# Patient Record
Sex: FEMALE | Race: WHITE | NOT HISPANIC OR LATINO | Employment: FULL TIME | URBAN - METROPOLITAN AREA
[De-identification: names, ages, dates, MRNs, and addresses within clinical notes are randomized per-mention and may not be internally consistent; named-entity substitution may affect disease eponyms.]

---

## 2017-03-10 RX ORDER — TRIAMTERENE AND HYDROCHLOROTHIAZIDE 37.5; 25 MG/1; MG/1
CAPSULE ORAL
Qty: 90 CAPSULE | Refills: 2 | Status: SHIPPED | OUTPATIENT
Start: 2017-03-10 | End: 2017-08-24 | Stop reason: SDUPTHER

## 2017-04-11 ENCOUNTER — OFFICE VISIT (OUTPATIENT)
Dept: OBSTETRICS AND GYNECOLOGY | Facility: CLINIC | Age: 62
End: 2017-04-11
Payer: COMMERCIAL

## 2017-04-11 VITALS
WEIGHT: 174.63 LBS | HEIGHT: 66 IN | BODY MASS INDEX: 28.06 KG/M2 | DIASTOLIC BLOOD PRESSURE: 72 MMHG | SYSTOLIC BLOOD PRESSURE: 124 MMHG

## 2017-04-11 DIAGNOSIS — N95.9 MENOPAUSAL AND PERIMENOPAUSAL DISORDER: ICD-10-CM

## 2017-04-11 DIAGNOSIS — Z01.419 VISIT FOR GYNECOLOGIC EXAMINATION: Primary | ICD-10-CM

## 2017-04-11 DIAGNOSIS — R10.31 BILATERAL LOWER ABDOMINAL PAIN: ICD-10-CM

## 2017-04-11 DIAGNOSIS — R10.32 BILATERAL LOWER ABDOMINAL PAIN: ICD-10-CM

## 2017-04-11 PROCEDURE — 99999 PR PBB SHADOW E&M-EST. PATIENT-LVL III: CPT | Mod: PBBFAC,,, | Performed by: OBSTETRICS & GYNECOLOGY

## 2017-04-11 PROCEDURE — 99396 PREV VISIT EST AGE 40-64: CPT | Mod: S$GLB,,, | Performed by: OBSTETRICS & GYNECOLOGY

## 2017-04-11 NOTE — PROGRESS NOTES
HISTORY OF PRESENT ILLNESS:    Chelsie Foster is a 61 y.o. female , presents for a routine exam and has no complaints.  PAP DUE  AND BREAST TEAM F/U BREAST MONITORING - DX MAMMO DUE ON Monday.  OCCAS RLQ AND LLQ SHARP PAINS, ?? GI, GAS SOURCE BUT USG TO MONITOR.  NEW RELATIONSHIP, DISCUSSED HYPOALLERGENIC LUBRICANTS PRN  LAST WEEKEND CAMPING WITH SISTER AND MOTHER    LAST VISIT 3/2016:  4/15 TREATMENT FOR BREAST CA. PAP DUE  AND MAMMO HAS BEEN ORDERED FOR F/U BREAST CA LAST YEAR. OCCASIONAL RLQ AND LLQ PAINS, BRIEF AND NOT OVERALL WORSENING. ASSOCIATED WITH GAS AND REASSURED PELVIC EXAM WITHOUT MASSES; IF PERSISTS MAY MERIT A PELVIC USG BUT PLANS COLONOSCOPY SOON, SO WILL BE CONSCIENTIOUS ABOUT MEASURES TO MAINTAIN NORMAL BOWEL FUNCTION     LAST VISIT 2015:  Pap DUE AND REPEATED. MAMMO DUE 2015 AND ORDERS PLACED. HAS NO GYN C/O AND NOTES SOME DIARRHEA AND ABD PAIN WITH STRESS - REASSURED NO GYN ISSUES ON EXAM.   Last visit 10/2012:  HOT FLUSHES RARELY, WORSE WITH STRESS. STILL MUCH CAFFEINE INTAKE. MAMMO DUE MARCH AND HAD BMD LAST YEAR. PAP DONE AND DISCUSSED RECOMMENDATIONS FOR 3YR SCREENING  WORKS MEDICAL RECORDS - DISABILITY FORMS    Past Medical History:   Diagnosis Date    Anxiety     Breast cancer 2015    Right breast infiltrating ductal CA, ER/UT/Her2 negative    Depression     Genetic testing     see clinic notes 5-19-15 for details, VUS in [place]    GERD (gastroesophageal reflux disease)     Hyperlipidemia     Hypertension     PONV (postoperative nausea and vomiting)     Thyroid disease     hypothryoidism       Past Surgical History:   Procedure Laterality Date    APPENDECTOMY      BREAST BIOPSY Right 2015    [facility]     SECTION      COLONOSCOPY N/A 2016    Procedure: COLONOSCOPY;  Surgeon: YULIYA Sherman MD;  Location: [address]);  Service: Endoscopy;  Laterality: N/A;        MEDICATIONS AND ALLERGIES:      Current Outpatient Prescriptions:      atenolol (TENORMIN) 25 MG tablet, TAKE ONE TABLET BY MOUTH EVERY DAY, Disp: 90 tablet, Rfl: 12    Lactobacillus acidophilus Cap, Take 2 capsules by mouth 2 (two) times daily., Disp: 100 each, Rfl: prn    lorazepam (ATIVAN) 1 MG tablet, TAKE ONE-HALF TO ONE TABLET EVERY EVENING, Disp: 30 tablet, Rfl: 5    pantoprazole (PROTONIX) 40 MG tablet, TAKE 1 TABLET BY MOUTH EVERY DAY, Disp: 90 tablet, Rfl: 3    pravastatin (PRAVACHOL) 40 MG tablet, TAKE 1 TABLET BY MOUTH EVERY DAY, Disp: 90 tablet, Rfl: 3    SYNTHROID 112 mcg tablet, TAKE ONE TABLET BY MOUTH EVERY DAY, Disp: 90 tablet, Rfl: 3    triamterene-hydrochlorothiazide 37.5-25 mg (DYAZIDE) 37.5-25 mg per capsule, TAKE 1 CAPSULE BY MOUTH ONCE DAILY, Disp: 90 capsule, Rfl: 2    Review of patient's allergies indicates:   Allergen Reactions    No known drug allergies        Family History   Problem Relation Age of Onset    Hypertension Mother     Hyperlipidemia Mother     Breast cancer Mother 60    Cancer Mother      breast CA and Basal cell CA    Depression Mother     Colon cancer Father     Dementia Father     Heart disease Father      chf    Cancer Father      colon CA and Basal Cell CA    Breast cancer Maternal Aunt 50    Cancer Maternal Aunt      Breast CA and Colon CA    Breast cancer Cousin 49     maternal    Hypertension Sister     Diabetes Sister     Hypertension Sister     Schizophrenia Son     Bipolar disorder Son     Amblyopia Neg Hx     Blindness Neg Hx     Cataracts Neg Hx     Glaucoma Neg Hx     Macular degeneration Neg Hx     Retinal detachment Neg Hx     Strabismus Neg Hx     Stroke Neg Hx     Thyroid disease Neg Hx     Ovarian cancer Neg Hx        Social History     Social History    Marital status:      Spouse name: N/A    Number of children: 1    Years of education: N/A     Occupational History     [employer]     Social History Main Topics    Smoking status: Never Smoker    Smokeless  "tobacco: Never Used    Alcohol use Yes      Comment: rarely    Drug use: No    Sexual activity: Yes     Partners: Male     Birth control/ protection: Post-menopausal     Other Topics Concern    Not on file     Social History Narrative    Walks a lot at work       COMPREHENSIVE GYN HISTORY:  PAP History:  Denies abnormal Paps except a noted above.  Infection History: Denies STDs. Denies PID.  Benign History: Denies uterine fibroids. Denies ovarian cysts. Denies endometriosis.  Denies other conditions.  Cancer History: Denies cervical cancer. Denies uterine cancer or hyperplasia. Denies ovarian cancer. Denies vulvar cancer or pre-cancer. Denies vaginal cancer or pre-cancer. Denies breast cancer. Denies colon cancer.    ROS:  GENERAL: No weight changes. No swelling. No fatigue. No fever.  CARDIOVASCULAR: No chest pain. No shortness of breath. No leg cramps.   NEUROLOGICAL: No headaches. No vision changes.  BREASTS: No pain. No lumps. No discharge.  ABDOMEN: No pain. No nausea. No vomiting. No diarrhea. No constipation.  REPRODUCTIVE: No abnormal bleeding.   VULVA: No pain. No lesions. No itching.  VAGINA: No relaxation. No itching. No odor. No discharge. No lesions.  URINARY: No incontinence. No nocturia. No frequency. No dysuria.    /72  Ht 5' 6" (1.676 m)  Wt 79.2 kg (174 lb 9.7 oz)  LMP  (LMP Unknown)  BMI 28.18 kg/m2    PE:  APPEARANCE: Well nourished, well developed, in no acute distress.  AFFECT: WNL, alert and oriented x 3.  SKIN: No hirsutism or acne.  NECK: Neck symmetric without masses or thyromegaly.  NODES: No inguinal, cervical, axillary or femoral lymph node enlargement.  CHEST: Good respiratory effort.   ABDOMEN: Soft. No tenderness or masses. No hepatosplenomegaly. No hernias.  BREASTS: LEFT no skin changes or visible lesions. No palpable masses, nipple discharge bilaterally. RIGHT UPPER INNER QUADRANT SCAR S/P SEGMENTAL RESECTION  PELVIC: ATROPHIC EXTERNAL FEMALE GENITALIA without " lesions. Normal hair distribution. Adequate perineal body, normal urethral meatus. VAGINA DRY without lesions or discharge. CERVIX STENOTIC without lesions, discharge or tenderness. No significant cystocele or rectocele. Bimanual exam shows uterus to be normal size, regular, mobile and nontender. Adnexa without masses or tenderness.  EXTREMITIES: No edema.    PROCEDURES:  Pap    DIAGNOSIS:  1. Visit for gynecologic examination     2. Menopausal and perimenopausal disorder     3. Bilateral lower abdominal pain  US Pelvis Comp with Transvag NON-OB (xpd       PLAN:    LABS AND TESTS ORDERED:  Mammogram    COUNSELING:  The patient was counseled today on osteoporosis prevention, calcium supplementation, and regular weight bearing exercise. The patient was also counseled today on ACS PAP guidelines, with recommendations for yearly pelvic exams unless their uterus, cervix, and ovaries were removed for benign reasons; in that case, examinations every 3-5 years are recommended.  The patient was also counseled regarding monthly breast self-examination, routine STD screening for at-risk populations, prophylactic immunizations for transmitted infections such as  HPV, Pertussis, or Influenza as appropriate, and yearly mammograms when indicated by ACS guidelines.  She was advised to see her primary care physician for all other health maintenance.    FOLLOW-UP with me annually.

## 2017-04-20 ENCOUNTER — HOSPITAL ENCOUNTER (OUTPATIENT)
Dept: RADIOLOGY | Facility: HOSPITAL | Age: 62
Discharge: HOME | End: 2017-04-20
Attending: RADIOLOGY
Payer: COMMERCIAL

## 2017-04-20 ENCOUNTER — OFFICE VISIT (OUTPATIENT)
Dept: RADIATION ONCOLOGY | Facility: CLINIC | Age: 62
End: 2017-04-20
Payer: COMMERCIAL

## 2017-04-20 VITALS — DIASTOLIC BLOOD PRESSURE: 65 MMHG | HEART RATE: 73 BPM | SYSTOLIC BLOOD PRESSURE: 143 MMHG

## 2017-04-20 DIAGNOSIS — C50.311 MALIGNANT NEOPLASM OF LOWER-INNER QUADRANT OF RIGHT FEMALE BREAST: ICD-10-CM

## 2017-04-20 DIAGNOSIS — C50.211 MALIGNANT NEOPLASM OF UPPER-INNER QUADRANT OF RIGHT FEMALE BREAST: Primary | ICD-10-CM

## 2017-04-20 PROCEDURE — 99999 PR PBB SHADOW E&M-EST. PATIENT-LVL III: CPT | Mod: PBBFAC,,, | Performed by: RADIOLOGY

## 2017-04-20 PROCEDURE — 99213 OFFICE O/P EST LOW 20 MIN: CPT | Mod: S$GLB,,, | Performed by: RADIOLOGY

## 2017-04-20 PROCEDURE — 77066 DX MAMMO INCL CAD BI: CPT | Mod: 26,,, | Performed by: RADIOLOGY

## 2017-04-20 PROCEDURE — 77062 BREAST TOMOSYNTHESIS BI: CPT | Mod: 26,,, | Performed by: RADIOLOGY

## 2017-04-20 PROCEDURE — 77066 DX MAMMO INCL CAD BI: CPT | Mod: TC

## 2017-04-20 NOTE — PROGRESS NOTES
Subjective:       Patient ID: Chelsie Foster is a 61 y.o. female.    Chief Complaint: Breast Cancer (1yr f/u;mmg)    HPI Comments: This patient returns for follow up visit.     Chelsie has a history of Stage IA (T1a, N0, M0) infiltrating ductal carcinoma of the Rt. breast.  She presented when Diagnostic [facility] confirmed a round mass measuring 7 mm with spiculated margins in the meddle region of the Rt. breast at the 3 o'clock position. The area was not clearly seen on ultrasound. Core biopsies on 4/24/15 revealed infiltrating ductal carcinoma, grade 3. The tumor was ER, [place] and Her - 2 negative. The patient underwent wire localization lumpectomy with sentinel node biopsy on 5/15/15. Pathology revealed residual focal high grade DCIS. The margins were negative. The posterior margin was closest at 3 mm. There was no residual infiltrating carcinoma. One sentinel node was negative for tumor involvement.  The largest focus of tumor was on the core and measured 4 mm.  The patient has large pendulous breast and she was referred for adjuvant radiotherapy.  Given her breast size we elected to proceed with partial breast irradiation.  She completed 34 Gy to the resection cavity via the Mammosite catheter on 6/19/15.  She has remained SOLEDAD since that time.  Today, the patient states she feels well.  No significant breast complaints.      Review of Systems   Constitutional: Negative for activity change, appetite change, chills and fatigue.   Respiratory: Negative for cough and shortness of breath.    Cardiovascular: Negative for chest pain and palpitations.   Gastrointestinal: Negative for abdominal pain, constipation and diarrhea.       Objective:      Physical Exam   Constitutional: She appears well-developed and well-nourished. No distress.   Neck: Normal range of motion. Neck supple.   Pulmonary/Chest: Right breast exhibits no inverted nipple, no mass, no nipple discharge, no skin change and no tenderness. Left breast exhibits  no inverted nipple, no mass, no nipple discharge, no skin change and no tenderness.       Lymphadenopathy:     She has no cervical adenopathy.     She has no axillary adenopathy.        Right: No supraclavicular adenopathy present.        Left: No supraclavicular adenopathy present.       Radiology - Mammogram earlier today was negative.    Assessment:       1. Malignant neoplasm of upper-inner quadrant of right female breast        Plan:       Doing well  no evidence of tumor recurrence.  Will plan follow up in one year with Marlena Groves next year in survivorship clinic.

## 2017-04-21 DIAGNOSIS — Z85.3 PERSONAL HISTORY OF BREAST CANCER: Primary | ICD-10-CM

## 2017-05-10 ENCOUNTER — HOSPITAL ENCOUNTER (OUTPATIENT)
Dept: RADIOLOGY | Facility: HOSPITAL | Age: 62
Discharge: HOME | End: 2017-05-10
Attending: OBSTETRICS & GYNECOLOGY
Payer: COMMERCIAL

## 2017-05-10 DIAGNOSIS — R10.31 BILATERAL LOWER ABDOMINAL PAIN: ICD-10-CM

## 2017-05-10 DIAGNOSIS — R10.32 BILATERAL LOWER ABDOMINAL PAIN: ICD-10-CM

## 2017-05-10 PROCEDURE — 76830 TRANSVAGINAL US NON-OB: CPT | Mod: 26,,, | Performed by: RADIOLOGY

## 2017-05-10 PROCEDURE — 76856 US EXAM PELVIC COMPLETE: CPT | Mod: TC

## 2017-05-10 PROCEDURE — 76856 US EXAM PELVIC COMPLETE: CPT | Mod: 26,,, | Performed by: RADIOLOGY

## 2017-05-15 ENCOUNTER — PATIENT MESSAGE (OUTPATIENT)
Dept: OBSTETRICS AND GYNECOLOGY | Facility: CLINIC | Age: 62
End: 2017-05-15

## 2017-06-08 RX ORDER — ATENOLOL 25 MG/1
25 TABLET ORAL DAILY
Qty: 90 TABLET | Refills: 12 | Status: SHIPPED | OUTPATIENT
Start: 2017-06-08 | End: 2018-06-10 | Stop reason: SDUPTHER

## 2017-06-15 ENCOUNTER — OFFICE VISIT (OUTPATIENT)
Dept: OPTOMETRY | Facility: CLINIC | Age: 62
End: 2017-06-15
Payer: COMMERCIAL

## 2017-06-15 DIAGNOSIS — H53.8 BLURRED VISION, BILATERAL: Primary | ICD-10-CM

## 2017-06-15 DIAGNOSIS — Z46.0 ENCOUNTER FOR FITTING OR ADJUSTMENT OF SPECTACLES OR CONTACT LENSES: Primary | ICD-10-CM

## 2017-06-15 DIAGNOSIS — Z13.5 SCREENING FOR EYE CONDITION: ICD-10-CM

## 2017-06-15 DIAGNOSIS — I10 ESSENTIAL HYPERTENSION: ICD-10-CM

## 2017-06-15 DIAGNOSIS — H02.413 ACQUIRED MECHANICAL PTOSIS OF EYELID, BILATERAL: ICD-10-CM

## 2017-06-15 PROCEDURE — 92310 CONTACT LENS FITTING OU: CPT | Mod: ,,, | Performed by: OPTOMETRIST

## 2017-06-15 PROCEDURE — 99999 PR PBB SHADOW E&M-EST. PATIENT-LVL III: CPT | Mod: PBBFAC,,, | Performed by: OPTOMETRIST

## 2017-06-15 PROCEDURE — 92014 COMPRE OPH EXAM EST PT 1/>: CPT | Mod: S$GLB,,, | Performed by: OPTOMETRIST

## 2017-06-15 PROCEDURE — 99499 UNLISTED E&M SERVICE: CPT | Mod: S$GLB,,, | Performed by: OPTOMETRIST

## 2017-06-15 NOTE — PATIENT INSTRUCTIONS
"Good ocular health in each eye.   Myopia ("nearsightedness") in each eye, per refraction done at previous visit. Presbyopia consistent with age.  New spectacle lens Rx issued for use in lieu of CLs:  Bifocal lenses or single vision distance lenses.  Good contact lens fit with present RGP CLs.  Wearing CLs well in each eye.  Showing need for only minimal power change (+0.25 D) in each CL, but no need to replace either lens at this time.  Issued new contact lens Rx for later use as needed.  Okay to use +2.00 or +2.25 over the counter reading glasses over CLs as needed.  Recheck in one year - or prior if any problems in the interim.         "

## 2017-06-15 NOTE — PROGRESS NOTES
HPI     Concerns About Ocular Health    Additional comments: Eye exam and refraction, and contact lens fiitting.     Wears RGP CLs, and reading glasses over CLs.            Comments   61y old WF  Approximate date of last eye examination: 04/14/16  Name of last eye doctor seen:  Dr Ortiz  City/State:  [place]   Wears glasses? Yes     If yes, wears full-time or part-time?  Part-time    Present glasses are bifocal / S V Distance / S V Reading:  Single vision   Approximate age of present glasses:  12+ yrs   Got new glasses following last exam, or subsequently?:  No   Any problem with VA with glasses?  No  Wears CLs?:  Yes           If yes:              Type of CL worn:  [employer] RGP              Wears full-time or part-time:  Full-time               Sleeps with contact lenses:  No               CL Solution used:  wiseri               How often replace CLs: current ones are 10+y old (?)              Any problem with VA with CLs?  No, Pt st she is happy with   vision and comfort of current RX reading is getting problematic and Pt   uses OTC readers              Has patient read and signed the contact lens fee information   document? Yes  Headaches? occasionally but  Not related to her vision  Eye pain/discomfort?  No                                                                                       Flashes?  No  Floaters?  No  Diplopia/Double vision?  No  Patient's Ocular History:         Any eye surgeries?  No         Any eye injury?  No         Any treatment for eye disease?  No  Family history of eye disease?  Parents + Cataracts   Significant patient medical history:         1. Diabetes?  No       If yes, IDDM or NIDDM? NA   2. HBP?  Yes, controlled by medication              3. Other (describe):  Hyperthyroidism and Elevated Cholesterol      ! OTC eyedrops currently using:  Yes, Pt states she is using OCT   (artificial tears) before putting contacts in, in the morning    ! Prescription eye meds currently using:   "No   ! Any history of allergy/adverse reaction to any eye meds used   previously?  No    ! Any history of allergy/adverse reaction to eyedrops used during prior   eye exam(s)? No    ! Any history of allergy/adverse reaction to Novacaine or similar meds?   No   ! Any history of allergy/adverse reaction to Epinephrine or similar meds?   No    ! Patient okay with use of anesthetic eyedrops to check eye pressure? Yes            ! Patient okay with use of eyedrops to dilate pupils today? Yes    !  Allergies/Medications/Medical History/Family History reviewed today?    Yes    PD =   56/53  Desired reading distance =  16.25"                                                                Last edited by Mat Ortiz, OD on 6/15/2017  3:23 PM. (History)            Assessment /Plan     For exam results, see Encounter Report.    1. Blurred vision, bilateral     2. Acquired mechanical ptosis of eyelid, bilateral     3. Essential hypertension     4. Screening for eye condition                      Good ocular health in each eye.   Myopia ("nearsightedness") in each eye, per refraction done at previous visit. Presbyopia consistent with age.  New spectacle lens Rx issued for use in lieu of CLs:  Bifocal lenses or single vision distance lenses.  Good contact lens fit with present RGP CLs.  Wearing CLs well in each eye.  Showing need for only minimal power change (+0.25 D) in each CL, but no need to replace either lens at this time.  Issued new contact lens Rx for later use as needed.  Okay to use +2.00 or +2.25 over the counter reading glasses over CLs as needed.  Recheck in one year - or prior if any problems in the interim.           "

## 2017-06-15 NOTE — PROGRESS NOTES
"HPI     Contact Lens Follow Up    Additional comments: contact lens follow up with general eye exam.             Comments   Patient in today for contact lens follow-up with general eye examination.    Refer to additional patient encounter notes dated 06/15/2017   Wears RGP   CLs and reading glasses over CLs.          Last edited by Mat Ortiz, OD on 6/15/2017  3:19 PM. (History)            Assessment /Plan     For exam results, see Encounter Report.    1. Encounter for fitting or adjustment of spectacles or contact lenses                      Good ocular health in each eye.   Myopia ("nearsightedness") in each eye, per refraction done at previous visit. Presbyopia consistent with age.  New spectacle lens Rx issued for use in lieu of CLs:  Bifocal lenses or single vision distance lenses.  Good contact lens fit with present RGP CLs.  Wearing CLs well in each eye.  Showing need for only minimal power change (+0.25 D) in each CL, but no need to replace either lens at this time.  Issued new contact lens Rx for later use as needed.  Okay to use +2.00 or +2.25 over the counter reading glasses over CLs as needed.  Recheck in one year - or prior if any problems in the interim.           "

## 2017-08-16 ENCOUNTER — TELEPHONE (OUTPATIENT)
Dept: INTERNAL MEDICINE | Facility: CLINIC | Age: 62
End: 2017-08-16

## 2017-08-16 DIAGNOSIS — Z00.00 ANNUAL PHYSICAL EXAM: Primary | ICD-10-CM

## 2017-08-16 NOTE — TELEPHONE ENCOUNTER
----- Message from Favio Young sent at 8/16/2017 12:36 PM CDT -----  Contact: Pt at [phone]  Doctor appointment and lab have been scheduled.  Please link lab orders to the lab appointment.  Date of doctor appointment:  8/24  Physical or EP:  physical  Date of lab appointment:  8/21  Comments:

## 2017-08-18 ENCOUNTER — TELEPHONE (OUTPATIENT)
Dept: INTERNAL MEDICINE | Facility: CLINIC | Age: 62
End: 2017-08-18

## 2017-08-18 NOTE — TELEPHONE ENCOUNTER
----- Message from Godwin Sanchez sent at 8/18/2017 12:32 PM CDT -----  Regarding: Lab Orders  Pt coming in to do labs. No orders are in epic. Please input orders in epic so patient can have labs drawn.

## 2017-08-21 ENCOUNTER — LAB VISIT (OUTPATIENT)
Dept: LAB | Facility: HOSPITAL | Age: 62
End: 2017-08-21
Attending: INTERNAL MEDICINE
Payer: COMMERCIAL

## 2017-08-21 DIAGNOSIS — Z00.00 ANNUAL PHYSICAL EXAM: ICD-10-CM

## 2017-08-21 LAB
ALBUMIN SERPL BCP-MCNC: 3.6 G/DL
ALP SERPL-CCNC: 98 U/L
ALT SERPL W/O P-5'-P-CCNC: 20 U/L
ANION GAP SERPL CALC-SCNC: 8 MMOL/L
AST SERPL-CCNC: 19 U/L
BASOPHILS # BLD AUTO: 0.04 K/UL
BASOPHILS NFR BLD: 0.6 %
BILIRUB SERPL-MCNC: 0.3 MG/DL
BUN SERPL-MCNC: 19 MG/DL
CALCIUM SERPL-MCNC: 9.8 MG/DL
CHLORIDE SERPL-SCNC: 102 MMOL/L
CHOLEST/HDLC SERPL: 4.6 {RATIO}
CO2 SERPL-SCNC: 31 MMOL/L
CREAT SERPL-MCNC: 0.9 MG/DL
DIFFERENTIAL METHOD: ABNORMAL
EOSINOPHIL # BLD AUTO: 0.3 K/UL
EOSINOPHIL NFR BLD: 4.2 %
ERYTHROCYTE [DISTWIDTH] IN BLOOD BY AUTOMATED COUNT: 15.5 %
EST. GFR  (AFRICAN AMERICAN): >60 ML/MIN/1.73 M^2
EST. GFR  (NON AFRICAN AMERICAN): >60 ML/MIN/1.73 M^2
GLUCOSE SERPL-MCNC: 100 MG/DL
HCT VFR BLD AUTO: 37.6 %
HDL/CHOLESTEROL RATIO: 21.8 %
HDLC SERPL-MCNC: 197 MG/DL
HDLC SERPL-MCNC: 43 MG/DL
HGB BLD-MCNC: 12.6 G/DL
LDLC SERPL CALC-MCNC: 121 MG/DL
LYMPHOCYTES # BLD AUTO: 2 K/UL
LYMPHOCYTES NFR BLD: 30.4 %
MCH RBC QN AUTO: 28.1 PG
MCHC RBC AUTO-ENTMCNC: 33.5 G/DL
MCV RBC AUTO: 84 FL
MONOCYTES # BLD AUTO: 0.6 K/UL
MONOCYTES NFR BLD: 8.6 %
NEUTROPHILS # BLD AUTO: 3.7 K/UL
NEUTROPHILS NFR BLD: 56 %
NONHDLC SERPL-MCNC: 154 MG/DL
PLATELET # BLD AUTO: 263 K/UL
PMV BLD AUTO: 9.8 FL
POTASSIUM SERPL-SCNC: 3.9 MMOL/L
PROT SERPL-MCNC: 7.7 G/DL
RBC # BLD AUTO: 4.48 M/UL
SODIUM SERPL-SCNC: 141 MMOL/L
TRIGL SERPL-MCNC: 165 MG/DL
TSH SERPL DL<=0.005 MIU/L-ACNC: 2.46 UIU/ML
WBC # BLD AUTO: 6.65 K/UL

## 2017-08-21 PROCEDURE — 80061 LIPID PANEL: CPT

## 2017-08-21 PROCEDURE — 36415 COLL VENOUS BLD VENIPUNCTURE: CPT

## 2017-08-21 PROCEDURE — 80053 COMPREHEN METABOLIC PANEL: CPT

## 2017-08-21 PROCEDURE — 84443 ASSAY THYROID STIM HORMONE: CPT

## 2017-08-21 PROCEDURE — 85025 COMPLETE CBC W/AUTO DIFF WBC: CPT

## 2017-08-24 ENCOUNTER — OFFICE VISIT (OUTPATIENT)
Dept: INTERNAL MEDICINE | Facility: CLINIC | Age: 62
End: 2017-08-24
Payer: COMMERCIAL

## 2017-08-24 VITALS
DIASTOLIC BLOOD PRESSURE: 64 MMHG | OXYGEN SATURATION: 95 % | HEIGHT: 66 IN | SYSTOLIC BLOOD PRESSURE: 110 MMHG | HEART RATE: 74 BPM | BODY MASS INDEX: 27.95 KG/M2 | WEIGHT: 173.94 LBS

## 2017-08-24 DIAGNOSIS — T63.484D: ICD-10-CM

## 2017-08-24 DIAGNOSIS — Z00.00 ANNUAL PHYSICAL EXAM: Primary | ICD-10-CM

## 2017-08-24 DIAGNOSIS — F43.9 SITUATIONAL STRESS: ICD-10-CM

## 2017-08-24 DIAGNOSIS — E78.5 HYPERLIPIDEMIA, UNSPECIFIED HYPERLIPIDEMIA TYPE: ICD-10-CM

## 2017-08-24 DIAGNOSIS — I10 ESSENTIAL HYPERTENSION: ICD-10-CM

## 2017-08-24 DIAGNOSIS — K21.9 GASTROESOPHAGEAL REFLUX DISEASE WITHOUT ESOPHAGITIS: ICD-10-CM

## 2017-08-24 DIAGNOSIS — N62 LARGE BREASTS: ICD-10-CM

## 2017-08-24 DIAGNOSIS — E03.4 HYPOTHYROIDISM DUE TO ACQUIRED ATROPHY OF THYROID: ICD-10-CM

## 2017-08-24 PROCEDURE — 99999 PR PBB SHADOW E&M-EST. PATIENT-LVL III: CPT | Mod: PBBFAC,,, | Performed by: INTERNAL MEDICINE

## 2017-08-24 PROCEDURE — 99396 PREV VISIT EST AGE 40-64: CPT | Mod: S$GLB,,, | Performed by: INTERNAL MEDICINE

## 2017-08-24 RX ORDER — LEVOTHYROXINE SODIUM 112 UG/1
112 TABLET ORAL DAILY
Qty: 90 TABLET | Refills: 3 | Status: SHIPPED | OUTPATIENT
Start: 2017-08-24 | End: 2017-08-27

## 2017-08-24 RX ORDER — PANTOPRAZOLE SODIUM 40 MG/1
40 TABLET, DELAYED RELEASE ORAL DAILY
Qty: 90 TABLET | Refills: 3 | Status: SHIPPED | OUTPATIENT
Start: 2017-08-24 | End: 2018-11-15

## 2017-08-24 RX ORDER — LORAZEPAM 1 MG/1
TABLET ORAL
Qty: 30 TABLET | Refills: 2 | Status: SHIPPED | OUTPATIENT
Start: 2017-08-24 | End: 2018-05-20 | Stop reason: SDUPTHER

## 2017-08-24 RX ORDER — TRIAMTERENE AND HYDROCHLOROTHIAZIDE 37.5; 25 MG/1; MG/1
1 CAPSULE ORAL DAILY
Qty: 90 CAPSULE | Refills: 2 | Status: SHIPPED | OUTPATIENT
Start: 2017-08-24 | End: 2018-03-24 | Stop reason: SDUPTHER

## 2017-08-24 NOTE — PROGRESS NOTES
Subjective:       Patient ID: Chelsie Foster is a 61 y.o. female.    Chief Complaint: Annual Exam    HPI   Doing well.  H/o hypothyroidism, htn and hyperlipidemia.  Continued stress with son.    Anxiety has improved.  Takes ativan rarely, occas half for rest.    GERD controlled with protonix.      Hornet sting several days ago.  Review of Systems   Constitutional: Negative for fever and unexpected weight change.   HENT: Negative for congestion and postnasal drip.    Eyes: Negative for pain, discharge and visual disturbance.   Respiratory: Negative for cough, chest tightness, shortness of breath and wheezing.    Cardiovascular: Negative for chest pain and leg swelling.   Gastrointestinal: Negative for abdominal pain, anal bleeding, constipation, diarrhea, nausea and vomiting.   Genitourinary: Negative for difficulty urinating, dysuria, hematuria and urgency.   Skin: Negative for rash.   Neurological: Negative for headaches.   Psychiatric/Behavioral: Negative for dysphoric mood and sleep disturbance. The patient is not nervous/anxious.        Objective:      Physical Exam   Constitutional: She is oriented to person, place, and time. She appears well-developed and well-nourished. No distress.   HENT:   Head: Normocephalic and atraumatic.   Right Ear: External ear normal.   Left Ear: External ear normal.   Nose: Nose normal.   Mouth/Throat: Oropharynx is clear and moist.   Eyes: Conjunctivae and EOM are normal. Pupils are equal, round, and reactive to light. Right eye exhibits no discharge. Left eye exhibits no discharge. No scleral icterus.   Neck: Normal range of motion. Neck supple. No JVD present. No thyromegaly present.   Cardiovascular: Normal rate, regular rhythm and normal heart sounds.  Exam reveals no gallop.    No murmur heard.  Pulmonary/Chest: Effort normal and breath sounds normal. No respiratory distress. She has no wheezes. She has no rales.   Very large breasts.   Abdominal: Soft. Bowel sounds are  normal. She exhibits no distension and no mass. There is no tenderness. There is no rebound and no guarding.   Genitourinary: No breast tenderness, discharge or bleeding.   Musculoskeletal: Normal range of motion. She exhibits no edema or tenderness.   Lymphadenopathy:     She has no cervical adenopathy.   Neurological: She is alert and oriented to person, place, and time. No cranial nerve deficit. Coordination normal.   Skin: Skin is warm and dry. No rash noted. There is erythema (erythematous nmacular papular lesion R upper chest).   Psychiatric: She has a normal mood and affect. Her behavior is normal. Judgment and thought content normal.       Results for orders placed or performed in visit on 08/21/17   Comprehensive metabolic panel   Result Value Ref Range    Sodium 141 136 - 145 mmol/L    Potassium 3.9 3.5 - 5.1 mmol/L    Chloride 102 95 - 110 mmol/L    CO2 31 (H) 23 - 29 mmol/L    Glucose 100 70 - 110 mg/dL    BUN, Bld 19 8 - 23 mg/dL    Creatinine 0.9 0.5 - 1.4 mg/dL    Calcium 9.8 8.7 - 10.5 mg/dL    Total Protein 7.7 6.0 - 8.4 g/dL    Albumin 3.6 3.5 - 5.2 g/dL    Total Bilirubin 0.3 0.1 - 1.0 mg/dL    Alkaline Phosphatase 98 55 - 135 U/L    AST 19 10 - 40 U/L    ALT 20 10 - 44 U/L    Anion Gap 8 8 - 16 mmol/L    eGFR if African American >60.0 >60 mL/min/1.73 m^2    eGFR if non African American >60.0 >60 mL/min/1.73 m^2   CBC auto differential   Result Value Ref Range    WBC 6.65 3.90 - 12.70 K/uL    RBC 4.48 4.00 - 5.40 M/uL    Hemoglobin 12.6 12.0 - 16.0 g/dL    Hematocrit 37.6 37.0 - 48.5 %    MCV 84 82 - 98 fL    MCH 28.1 27.0 - 31.0 pg    MCHC 33.5 32.0 - 36.0 g/dL    RDW 15.5 (H) 11.5 - 14.5 %    Platelets 263 150 - 350 K/uL    MPV 9.8 9.2 - 12.9 fL    Gran # 3.7 1.8 - 7.7 K/uL    Lymph # 2.0 1.0 - 4.8 K/uL    Mono # 0.6 0.3 - 1.0 K/uL    Eos # 0.3 0.0 - 0.5 K/uL    Baso # 0.04 0.00 - 0.20 K/uL    Gran% 56.0 38.0 - 73.0 %    Lymph% 30.4 18.0 - 48.0 %    Mono% 8.6 4.0 - 15.0 %    Eosinophil% 4.2 0.0  - 8.0 %    Basophil% 0.6 0.0 - 1.9 %    Differential Method Automated    Lipid panel   Result Value Ref Range    Cholesterol 197 120 - 199 mg/dL    Triglycerides 165 (H) 30 - 150 mg/dL    HDL 43 40 - 75 mg/dL    LDL Cholesterol 121.0 63.0 - 159.0 mg/dL    HDL/Chol Ratio 21.8 20.0 - 50.0 %    Total Cholesterol/HDL Ratio 4.6 2.0 - 5.0    Non-HDL Cholesterol 154 mg/dL   TSH   Result Value Ref Range    TSH 2.461 0.400 - 4.000 uIU/mL     Assessment:       1. Annual physical exam    2. Essential hypertension    3. Hypothyroidism due to acquired atrophy of thyroid    4. Hyperlipidemia, unspecified hyperlipidemia type    5. Gastroesophageal reflux disease without esophagitis    6. Large breasts    7. Insect sting, undetermined intent, subsequent encounter    8. Situational stress        Plan:       Chelsie was seen today for annual exam.    Diagnoses and all orders for this visit:    Annual physical exam    Essential hypertension    Hypothyroidism due to acquired atrophy of thyroid    Hyperlipidemia, unspecified hyperlipidemia type    Gastroesophageal reflux disease without esophagitis    Large breasts    Insect sting, undetermined intent, subsequent encounter    Situational stress    Other orders  -     levothyroxine (SYNTHROID) 112 MCG tablet; Take 1 tablet (112 mcg total) by mouth once daily.  -     pantoprazole (PROTONIX) 40 MG tablet; Take 1 tablet (40 mg total) by mouth once daily.  -     triamterene-hydrochlorothiazide 37.5-25 mg (DYAZIDE) 37.5-25 mg per capsule; Take 1 capsule by mouth once daily.  -     lorazepam (ATIVAN) 1 MG tablet; TAKE ONE-HALF TO ONE TABLET EVERY EVENING         Shingles and tetanus vaccines at pharmacy   We discussed benefits and risks of breast reduction study.     Antihistamines for insect sting

## 2017-08-27 RX ORDER — LEVOTHYROXINE SODIUM 112 UG/1
TABLET ORAL
Qty: 90 TABLET | Refills: 3 | Status: SHIPPED | OUTPATIENT
Start: 2017-08-27 | End: 2018-11-19 | Stop reason: SDUPTHER

## 2017-09-24 RX ORDER — PRAVASTATIN SODIUM 40 MG/1
TABLET ORAL
Qty: 90 TABLET | Refills: 3 | Status: SHIPPED | OUTPATIENT
Start: 2017-09-24 | End: 2018-10-23 | Stop reason: SDUPTHER

## 2017-10-31 RX ORDER — PANTOPRAZOLE SODIUM 40 MG/1
TABLET, DELAYED RELEASE ORAL
Qty: 90 TABLET | Refills: 3 | Status: SHIPPED | OUTPATIENT
Start: 2017-10-31 | End: 2018-09-26 | Stop reason: SDUPTHER

## 2018-03-21 ENCOUNTER — OFFICE VISIT (OUTPATIENT)
Dept: OPTOMETRY | Facility: CLINIC | Age: 63
End: 2018-03-21
Payer: COMMERCIAL

## 2018-03-21 DIAGNOSIS — H04.123 DRY EYE SYNDROME, BILATERAL: ICD-10-CM

## 2018-03-21 DIAGNOSIS — H43.812: Primary | ICD-10-CM

## 2018-03-21 PROCEDURE — 92014 COMPRE OPH EXAM EST PT 1/>: CPT | Mod: S$GLB,,, | Performed by: OPTOMETRIST

## 2018-03-21 PROCEDURE — 99999 PR PBB SHADOW E&M-EST. PATIENT-LVL II: CPT | Mod: PBBFAC,,, | Performed by: OPTOMETRIST

## 2018-03-21 NOTE — PROGRESS NOTES
HPI     Patient's age: 62 y.o.    Approximate date of last eye examination:  6/15/17  Name of last eye doctor seen: Dr. Ortiz    Pt states that on the other night Monday saw this flash of lot on the    Left side, went away then saw this squiggly line black carmen floating,   never had that before.    Wears glasses? Yes    Wears contacts? yes         ! OTC eyedrops currently using:  Dry eye drops - OU PRN   ! Prescription eye meds currently using:  none         Last edited by Millie Alex MA on 3/21/2018  2:44 PM. (History)            Assessment /Plan     For exam results, see Encounter Report.    Symptomatic posterior vitreous detachment, left  Hemorrhagic PVD OS vs inferior tear    Consult Dr. Hopkins, appt made for Monday         Dry eye syndrome, bilateral   Use artificial tears BID, ointment QHS PRN   Discussed possibly switching to SCL

## 2018-03-25 RX ORDER — TRIAMTERENE AND HYDROCHLOROTHIAZIDE 37.5; 25 MG/1; MG/1
CAPSULE ORAL
Qty: 90 CAPSULE | Refills: 3 | Status: SHIPPED | OUTPATIENT
Start: 2018-03-25 | End: 2019-05-06 | Stop reason: SDUPTHER

## 2018-03-26 ENCOUNTER — INITIAL CONSULT (OUTPATIENT)
Dept: OPHTHALMOLOGY | Facility: CLINIC | Age: 63
End: 2018-03-26
Payer: COMMERCIAL

## 2018-03-26 DIAGNOSIS — H43.12 VITREOUS HEMORRHAGE, LEFT: ICD-10-CM

## 2018-03-26 DIAGNOSIS — H43.812 POSTERIOR VITREOUS DETACHMENT, LEFT: Primary | ICD-10-CM

## 2018-03-26 PROCEDURE — 99999 PR PBB SHADOW E&M-EST. PATIENT-LVL II: CPT | Mod: PBBFAC,,, | Performed by: OPHTHALMOLOGY

## 2018-03-26 PROCEDURE — 92225 PR SPECIAL EYE EXAM, INITIAL: CPT | Mod: LT,S$GLB,, | Performed by: OPHTHALMOLOGY

## 2018-03-26 PROCEDURE — 92014 COMPRE OPH EXAM EST PT 1/>: CPT | Mod: S$GLB,,, | Performed by: OPHTHALMOLOGY

## 2018-03-26 NOTE — LETTER
March 26, 2018      Shannon Iqbal, OD  [address]  [facility] [address]           [facility] - Ophthalmology  [address]  [facility] [address]  Phone: [phone]  Fax: [phone]          Patient: Chelsie Foster   MR Number: [id number]   YOB: 1955   Date of Visit: 3/26/2018       Dear Dr. Shannon Iqbal:    Thank you for referring Chelsie Foster to me for evaluation. Attached you will find relevant portions of my assessment and plan of care.    If you have questions, please do not hesitate to call me. I look forward to following Chelsie Foster along with you.    Sincerely,    LANDEN Hopkins MD    Enclosure  CC:  No Recipients    If you would like to receive this communication electronically, please contact [email or web address] or [phone] to request more information on [employer] Link access.    For providers and/or their staff who would like to refer a patient to [facility], please contact us through our one-stop-shop provider referral line, [facility], at [phone].    If you feel you have received this communication in error or would no longer like to receive these types of communications, please e-mail [email or web address]

## 2018-03-26 NOTE — PROGRESS NOTES
HPI     Eye Problem    Additional comments: consult per Rasheed/PVD OS           Comments   Patient started seeing flashes and floaters OS. She saw Dr Iqbal and was   sent here for evaluation PVD OS. She last saw flashes last week. She is   still seeing squiggly lines OS       Last edited by Kalyn Galindo on 3/26/2018  3:03 PM. (History)        A/P    1. Hemorrhagic PVD OS  No breaks or tears    Likely from ONH, no peripheral traction    RD precautions      1 month Dilate OS

## 2018-04-17 ENCOUNTER — OFFICE VISIT (OUTPATIENT)
Dept: OBSTETRICS AND GYNECOLOGY | Facility: CLINIC | Age: 63
End: 2018-04-17
Payer: COMMERCIAL

## 2018-04-17 VITALS
WEIGHT: 169.75 LBS | SYSTOLIC BLOOD PRESSURE: 120 MMHG | BODY MASS INDEX: 27.28 KG/M2 | HEIGHT: 66 IN | DIASTOLIC BLOOD PRESSURE: 72 MMHG

## 2018-04-17 DIAGNOSIS — N95.9 MENOPAUSAL AND PERIMENOPAUSAL DISORDER: ICD-10-CM

## 2018-04-17 DIAGNOSIS — Z01.419 VISIT FOR GYNECOLOGIC EXAMINATION: Primary | ICD-10-CM

## 2018-04-17 DIAGNOSIS — C50.911: ICD-10-CM

## 2018-04-17 DIAGNOSIS — Z12.39 BREAST CANCER SCREENING: ICD-10-CM

## 2018-04-17 PROCEDURE — 3078F DIAST BP <80 MM HG: CPT | Mod: CPTII,S$GLB,, | Performed by: OBSTETRICS & GYNECOLOGY

## 2018-04-17 PROCEDURE — 99999 PR PBB SHADOW E&M-EST. PATIENT-LVL II: CPT | Mod: PBBFAC,,, | Performed by: OBSTETRICS & GYNECOLOGY

## 2018-04-17 PROCEDURE — 99396 PREV VISIT EST AGE 40-64: CPT | Mod: S$GLB,,, | Performed by: OBSTETRICS & GYNECOLOGY

## 2018-04-17 PROCEDURE — 3074F SYST BP LT 130 MM HG: CPT | Mod: CPTII,S$GLB,, | Performed by: OBSTETRICS & GYNECOLOGY

## 2018-04-17 PROCEDURE — 88175 CYTOPATH C/V AUTO FLUID REDO: CPT

## 2018-04-17 NOTE — PROGRESS NOTES
HISTORY OF PRESENT ILLNESS:    Chelsie Foster is a 62 y.o. female , presents for a routine exam and has no complaints.  PAP SUBMITTED AND NEEDS MAMMO SCHEDULED.  SCARRING RIGHT UPPER INNER BREAST STABLE S/P RT.  NO GYN SX; STILL HAS OCCAS PELVIC PAIN BUT REASSURED USG LAST YEAR NEG AND VERY UNLIKELY DUE TO GYN CAUSE.  MILD VIRUS A WEEK AGO    LAST VISIT :   PAP DUE  AND BREAST TEAM F/U BREAST MONITORING - DX MAMMO DUE ON Monday.  OCCAS RLQ AND LLQ SHARP PAINS, ?? GI, GAS SOURCE BUT USG TO MONITOR.  NEW RELATIONSHIP, DISCUSSED HYPOALLERGENIC LUBRICANTS PRN  LAST WEEKEND CAMPING WITH SISTER AND MOTHER  LAST VISIT 3/2016:  4/15 TREATMENT FOR BREAST CA. PAP DUE  AND MAMMO HAS BEEN ORDERED FOR F/U BREAST CA LAST YEAR. OCCASIONAL RLQ AND LLQ PAINS, BRIEF AND NOT OVERALL WORSENING. ASSOCIATED WITH GAS AND REASSURED PELVIC EXAM WITHOUT MASSES; IF PERSISTS MAY MERIT A PELVIC USG BUT PLANS COLONOSCOPY SOON, SO WILL BE CONSCIENTIOUS ABOUT MEASURES TO MAINTAIN NORMAL BOWEL FUNCTION  LAST VISIT 2015:  Pap DUE AND REPEATED. MAMMO DUE 2015 AND ORDERS PLACED. HAS NO GYN C/O AND NOTES SOME DIARRHEA AND ABD PAIN WITH STRESS - REASSURED NO GYN ISSUES ON EXAM.   Last visit 10/2012:  HOT FLUSHES RARELY, WORSE WITH STRESS. STILL MUCH CAFFEINE INTAKE. MAMMO DUE MARCH AND HAD BMD LAST YEAR. PAP DONE AND DISCUSSED RECOMMENDATIONS FOR 3YR SCREENING  WORKS MEDICAL RECORDS - DISABILITY FORMS    Past Medical History:   Diagnosis Date    Anxiety     Breast cancer 2015    Right breast infiltrating ductal CA, ER/VA/Her2 negative    Depression     Genetic testing     see clinic notes 5-19-15 for details, VUS in [facility]    GERD (gastroesophageal reflux disease)     Hyperlipidemia     Hypertension     PONV (postoperative nausea and vomiting)     Thyroid disease     hypothryoidism       Past Surgical History:   Procedure Laterality Date    APPENDECTOMY      BREAST BIOPSY Right 2015    [facility]     SECTION       COLONOSCOPY N/A 5/5/2016    Procedure: COLONOSCOPY;  Surgeon: YULIYA Sherman MD;  Location: [facility] ([address] [place] [address]);  Service: Endoscopy;  Laterality: N/A;        MEDICATIONS AND ALLERGIES:      Current Outpatient Prescriptions:     atenolol (TENORMIN) 25 MG tablet, Take 1 tablet (25 mg total) by mouth once daily., Disp: 90 tablet, Rfl: 12    Lactobacillus acidophilus Cap, Take 2 capsules by mouth 2 (two) times daily., Disp: 100 each, Rfl: prn    lorazepam (ATIVAN) 1 MG tablet, TAKE ONE-HALF TO ONE TABLET EVERY EVENING, Disp: 30 tablet, Rfl: 2    pantoprazole (PROTONIX) 40 MG tablet, Take 1 tablet (40 mg total) by mouth once daily., Disp: 90 tablet, Rfl: 3    pantoprazole (PROTONIX) 40 MG tablet, TAKE 1 TABLET BY MOUTH EVERY DAY, Disp: 90 tablet, Rfl: 3    pravastatin (PRAVACHOL) 40 MG tablet, TAKE ONE TABLET BY MOUTH EVERY DAY, Disp: 90 tablet, Rfl: 3    SYNTHROID 112 mcg tablet, TAKE 1 TABLET BY MOUTH ONCE DAILY, Disp: 90 tablet, Rfl: 3    triamterene-hydrochlorothiazide 37.5-25 mg (DYAZIDE) 37.5-25 mg per capsule, TAKE 1 CAPSULE BY MOUTH EVERY DAY., Disp: 90 capsule, Rfl: 3    Review of patient's allergies indicates:   Allergen Reactions    No known drug allergies        Family History   Problem Relation Age of Onset    Hypertension Mother     Hyperlipidemia Mother     Breast cancer Mother 60    Cancer Mother      breast CA and Basal cell CA    Depression Mother     Colon cancer Father     Dementia Father     Heart disease Father      chf    Cancer Father      colon CA and Basal Cell CA    Breast cancer Maternal Aunt 50    Cancer Maternal Aunt      Breast CA and Colon CA    Breast cancer Cousin 49     maternal    Hypertension Sister     Diabetes Sister     Hypertension Sister     Bipolar disorder Son     Mental illness Son      bipolar/schizoaffective    Amblyopia Neg Hx     Blindness Neg Hx     Cataracts Neg Hx     Glaucoma Neg Hx     Macular degeneration Neg Hx     Retinal  "detachment Neg Hx     Strabismus Neg Hx     Stroke Neg Hx     Thyroid disease Neg Hx     Ovarian cancer Neg Hx        Social History     Social History    Marital status:      Spouse name: N/A    Number of children: 1    Years of education: N/A     Occupational History     [employer]     Social History Main Topics    Smoking status: Never Smoker    Smokeless tobacco: Never Used    Alcohol use Yes      Comment: rarely    Drug use: No    Sexual activity: Yes     Partners: Male     Birth control/ protection: Post-menopausal     Other Topics Concern    Not on file     Social History Narrative    Walks a lot at work:  [id number] most days1       COMPREHENSIVE GYN HISTORY:  PAP History:  Denies abnormal Paps except a noted above.  Infection History: Denies STDs. Denies PID.  Benign History: Denies uterine fibroids. Denies ovarian cysts. Denies endometriosis.  Denies other conditions.  Cancer History: Denies cervical cancer. Denies uterine cancer or hyperplasia. Denies ovarian cancer. Denies vulvar cancer or pre-cancer. Denies vaginal cancer or pre-cancer. Denies breast cancer. Denies colon cancer.    ROS:  GENERAL: No weight changes. No swelling. No fatigue. No fever.  CARDIOVASCULAR: No chest pain. No shortness of breath. No leg cramps.   NEUROLOGICAL: No headaches. No vision changes.  BREASTS: No pain. No lumps. No discharge.  ABDOMEN: No pain. No nausea. No vomiting. No diarrhea. No constipation.  REPRODUCTIVE: No abnormal bleeding.   VULVA: No pain. No lesions. No itching.  VAGINA: No relaxation. No itching. No odor. No discharge. No lesions.  URINARY: No incontinence. No nocturia. No frequency. No dysuria.    /72   Ht 5' 6" (1.676 m)   Wt 77 kg (169 lb 12.1 oz)   LMP  (LMP Unknown)   BMI 27.40 kg/m²     PE:  APPEARANCE: Well nourished, well developed, in no acute distress.  AFFECT: WNL, alert and oriented x 3.  SKIN: No hirsutism or acne.  NECK: Neck symmetric without " masses or thyromegaly.  NODES: No inguinal, cervical, axillary or femoral lymph node enlargement.  CHEST: Good respiratory effort.   ABDOMEN: Soft. No tenderness or masses. No hepatosplenomegaly. No hernias.  BREASTS: LEFT no skin changes or visible lesions. No palpable masses, nipple discharge bilaterally. RIGHT BREAST UPPER INNER SKIN RETRACTION, STABLE SCAR  PELVIC: ATROPHIC EXTERNAL FEMALE GENITALIA without lesions. Normal hair distribution. Adequate perineal body, normal urethral meatus. VAGINA DRY without lesions or discharge. CERVIX STENOTIC without lesions, discharge or tenderness. No significant cystocele or rectocele. Bimanual exam shows uterus to be normal size, regular, mobile and nontender. Adnexa without masses or tenderness.  EXTREMITIES: No edema.    PROCEDURES:  Pap    DIAGNOSIS:  1. Visit for gynecologic examination  Liquid-based pap smear, screening   2. Menopausal and perimenopausal disorder     3. Breast cancer screening     4. Stage 1 infiltrating ductal carcinoma of right female breast         PLAN:    LABS AND TESTS ORDERED:  Mammogram    COUNSELING:  The patient was counseled today on osteoporosis prevention, calcium supplementation, and regular weight bearing exercise. The patient was also counseled today on ACS PAP guidelines, with recommendations for yearly pelvic exams unless their uterus, cervix, and ovaries were removed for benign reasons; in that case, examinations every 3-5 years are recommended.  The patient was also counseled regarding monthly breast self-examination, routine STD screening for at-risk populations, prophylactic immunizations for transmitted infections such as  HPV, Pertussis, or Influenza as appropriate, and yearly mammograms when indicated by ACS guidelines.  She was advised to see her primary care physician for all other health maintenance.    FOLLOW-UP with me annually.

## 2018-04-22 ENCOUNTER — PATIENT MESSAGE (OUTPATIENT)
Dept: OBSTETRICS AND GYNECOLOGY | Facility: HOSPITAL | Age: 63
End: 2018-04-22

## 2018-04-23 ENCOUNTER — HOSPITAL ENCOUNTER (OUTPATIENT)
Dept: RADIOLOGY | Facility: HOSPITAL | Age: 63
Discharge: HOME | End: 2018-04-23
Attending: NURSE PRACTITIONER
Payer: COMMERCIAL

## 2018-04-23 DIAGNOSIS — Z85.3 PERSONAL HISTORY OF BREAST CANCER: ICD-10-CM

## 2018-04-23 PROCEDURE — 77066 DX MAMMO INCL CAD BI: CPT | Mod: TC,PO

## 2018-04-23 PROCEDURE — 77066 DX MAMMO INCL CAD BI: CPT | Mod: 26,,, | Performed by: RADIOLOGY

## 2018-04-23 PROCEDURE — G0279 TOMOSYNTHESIS, MAMMO: HCPCS | Mod: 26,,, | Performed by: RADIOLOGY

## 2018-04-24 ENCOUNTER — OFFICE VISIT (OUTPATIENT)
Dept: OPHTHALMOLOGY | Facility: CLINIC | Age: 63
End: 2018-04-24
Payer: COMMERCIAL

## 2018-04-24 ENCOUNTER — TELEPHONE (OUTPATIENT)
Dept: SURGERY | Facility: CLINIC | Age: 63
End: 2018-04-24

## 2018-04-24 DIAGNOSIS — H43.12 VITREOUS HEMORRHAGE, LEFT: Primary | ICD-10-CM

## 2018-04-24 DIAGNOSIS — H43.812 POSTERIOR VITREOUS DETACHMENT, LEFT: ICD-10-CM

## 2018-04-24 PROCEDURE — 99999 PR PBB SHADOW E&M-EST. PATIENT-LVL III: CPT | Mod: PBBFAC,,, | Performed by: OPHTHALMOLOGY

## 2018-04-24 PROCEDURE — 92226 PR SPECIAL EYE EXAM, SUBSEQUENT: CPT | Mod: LT,S$GLB,, | Performed by: OPHTHALMOLOGY

## 2018-04-24 PROCEDURE — 92014 COMPRE OPH EXAM EST PT 1/>: CPT | Mod: S$GLB,,, | Performed by: OPHTHALMOLOGY

## 2018-04-24 NOTE — TELEPHONE ENCOUNTER
Contacted the patient regarding the message below to schedule breast exam with Marlena Groves NP.  The patient did not answer, message left on the patient voicemail to contact our office regarding scheduling the appointment.  Marlena Groves NP notified of this information.

## 2018-04-24 NOTE — PROGRESS NOTES
HPI     1 mo / Dilates OS  DLS- 03/26/2018 Dr. Hopkins     Pt sts no vision change since last visit. Occasionally will still get   floaters OS.  Denies pain   (-)Photophobia  (-)Glare    No gtts    No prev eye sx     Last edited by Radha Pisano on 4/24/2018  3:32 PM. (History)           A/P    1. Hemorrhagic PVD OS  No breaks or tears    Likely from ONH, no peripheral traction    improving    RD precautions      PRN

## 2018-04-24 NOTE — TELEPHONE ENCOUNTER
----- Message from SUZI Camp sent at 4/24/2018  2:15 PM CDT -----  Patient not seen by me since 9/2016. Needs CBE.

## 2018-05-21 RX ORDER — LORAZEPAM 1 MG/1
TABLET ORAL
Qty: 30 TABLET | Refills: 1 | Status: SHIPPED | OUTPATIENT
Start: 2018-05-21 | End: 2019-01-22 | Stop reason: SDUPTHER

## 2018-06-10 RX ORDER — ATENOLOL 25 MG/1
TABLET ORAL
Qty: 90 TABLET | Refills: 12 | Status: SHIPPED | OUTPATIENT
Start: 2018-06-10 | End: 2019-06-13 | Stop reason: SDUPTHER

## 2018-08-09 ENCOUNTER — TELEPHONE (OUTPATIENT)
Dept: INTERNAL MEDICINE | Facility: CLINIC | Age: 63
End: 2018-08-09

## 2018-08-09 DIAGNOSIS — Z00.00 ANNUAL PHYSICAL EXAM: Primary | ICD-10-CM

## 2018-08-09 NOTE — TELEPHONE ENCOUNTER
----- Message from Kedar Holman sent at 8/9/2018 10:13 AM CDT -----  Contact: self / Ext [phone]  Type: Orders Request    What orders/ testing are being requested? Routine labs    Is there a future appointment scheduled for the patient with PCP? Yes    When? 09/26    Would you prefer a response via iSites? no    Comments:Please advise.      tHANKS

## 2018-08-23 RX ORDER — LEVOTHYROXINE SODIUM 112 UG/1
TABLET ORAL DAILY
Qty: 90 TABLET | Refills: 3 | Status: SHIPPED | OUTPATIENT
Start: 2018-08-23 | End: 2018-09-26 | Stop reason: SDUPTHER

## 2018-08-27 ENCOUNTER — LAB VISIT (OUTPATIENT)
Dept: LAB | Facility: HOSPITAL | Age: 63
End: 2018-08-27
Attending: INTERNAL MEDICINE
Payer: COMMERCIAL

## 2018-08-27 DIAGNOSIS — Z00.00 ANNUAL PHYSICAL EXAM: ICD-10-CM

## 2018-08-27 LAB
ALBUMIN SERPL BCP-MCNC: 3.8 G/DL
ALP SERPL-CCNC: 114 U/L
ALT SERPL W/O P-5'-P-CCNC: 26 U/L
ANION GAP SERPL CALC-SCNC: 8 MMOL/L
AST SERPL-CCNC: 21 U/L
BASOPHILS # BLD AUTO: 0.05 K/UL
BASOPHILS NFR BLD: 0.6 %
BILIRUB SERPL-MCNC: 0.3 MG/DL
BUN SERPL-MCNC: 23 MG/DL
CALCIUM SERPL-MCNC: 10.3 MG/DL
CHLORIDE SERPL-SCNC: 101 MMOL/L
CHOLEST SERPL-MCNC: 200 MG/DL
CHOLEST/HDLC SERPL: 4.8 {RATIO}
CO2 SERPL-SCNC: 32 MMOL/L
CREAT SERPL-MCNC: 0.9 MG/DL
DIFFERENTIAL METHOD: ABNORMAL
EOSINOPHIL # BLD AUTO: 0.3 K/UL
EOSINOPHIL NFR BLD: 3.6 %
ERYTHROCYTE [DISTWIDTH] IN BLOOD BY AUTOMATED COUNT: 15.3 %
EST. GFR  (AFRICAN AMERICAN): >60 ML/MIN/1.73 M^2
EST. GFR  (NON AFRICAN AMERICAN): >60 ML/MIN/1.73 M^2
GLUCOSE SERPL-MCNC: 103 MG/DL
HCT VFR BLD AUTO: 40.5 %
HDLC SERPL-MCNC: 42 MG/DL
HDLC SERPL: 21 %
HGB BLD-MCNC: 13.1 G/DL
IMM GRANULOCYTES # BLD AUTO: 0.03 K/UL
IMM GRANULOCYTES NFR BLD AUTO: 0.3 %
LDLC SERPL CALC-MCNC: 120.8 MG/DL
LYMPHOCYTES # BLD AUTO: 2.6 K/UL
LYMPHOCYTES NFR BLD: 30.7 %
MCH RBC QN AUTO: 28 PG
MCHC RBC AUTO-ENTMCNC: 32.3 G/DL
MCV RBC AUTO: 87 FL
MONOCYTES # BLD AUTO: 0.7 K/UL
MONOCYTES NFR BLD: 8.4 %
NEUTROPHILS # BLD AUTO: 4.9 K/UL
NEUTROPHILS NFR BLD: 56.4 %
NONHDLC SERPL-MCNC: 158 MG/DL
NRBC BLD-RTO: 0 /100 WBC
PLATELET # BLD AUTO: 300 K/UL
PMV BLD AUTO: 9.7 FL
POTASSIUM SERPL-SCNC: 4.1 MMOL/L
PROT SERPL-MCNC: 7.9 G/DL
RBC # BLD AUTO: 4.68 M/UL
SODIUM SERPL-SCNC: 141 MMOL/L
TRIGL SERPL-MCNC: 186 MG/DL
TSH SERPL DL<=0.005 MIU/L-ACNC: 1 UIU/ML
WBC # BLD AUTO: 8.6 K/UL

## 2018-08-27 PROCEDURE — 80061 LIPID PANEL: CPT

## 2018-08-27 PROCEDURE — 36415 COLL VENOUS BLD VENIPUNCTURE: CPT

## 2018-08-27 PROCEDURE — 80053 COMPREHEN METABOLIC PANEL: CPT

## 2018-08-27 PROCEDURE — 85025 COMPLETE CBC W/AUTO DIFF WBC: CPT

## 2018-08-27 PROCEDURE — 84443 ASSAY THYROID STIM HORMONE: CPT

## 2018-09-26 ENCOUNTER — OFFICE VISIT (OUTPATIENT)
Dept: INTERNAL MEDICINE | Facility: CLINIC | Age: 63
End: 2018-09-26
Payer: COMMERCIAL

## 2018-09-26 VITALS
HEIGHT: 66 IN | DIASTOLIC BLOOD PRESSURE: 76 MMHG | WEIGHT: 172 LBS | HEART RATE: 69 BPM | BODY MASS INDEX: 27.64 KG/M2 | OXYGEN SATURATION: 97 % | SYSTOLIC BLOOD PRESSURE: 110 MMHG

## 2018-09-26 DIAGNOSIS — E03.4 HYPOTHYROIDISM DUE TO ACQUIRED ATROPHY OF THYROID: ICD-10-CM

## 2018-09-26 DIAGNOSIS — K21.9 GASTROESOPHAGEAL REFLUX DISEASE WITHOUT ESOPHAGITIS: ICD-10-CM

## 2018-09-26 DIAGNOSIS — C50.911: ICD-10-CM

## 2018-09-26 DIAGNOSIS — Z00.00 ANNUAL PHYSICAL EXAM: Primary | ICD-10-CM

## 2018-09-26 DIAGNOSIS — I10 ESSENTIAL HYPERTENSION: ICD-10-CM

## 2018-09-26 DIAGNOSIS — E78.5 HYPERLIPIDEMIA, UNSPECIFIED HYPERLIPIDEMIA TYPE: ICD-10-CM

## 2018-09-26 DIAGNOSIS — Z80.0 FAMILY HISTORY OF COLON CANCER: ICD-10-CM

## 2018-09-26 PROCEDURE — 3074F SYST BP LT 130 MM HG: CPT | Mod: CPTII,S$GLB,, | Performed by: INTERNAL MEDICINE

## 2018-09-26 PROCEDURE — 99999 PR PBB SHADOW E&M-EST. PATIENT-LVL III: CPT | Mod: PBBFAC,,, | Performed by: INTERNAL MEDICINE

## 2018-09-26 PROCEDURE — 99396 PREV VISIT EST AGE 40-64: CPT | Mod: S$GLB,,, | Performed by: INTERNAL MEDICINE

## 2018-09-26 PROCEDURE — 3078F DIAST BP <80 MM HG: CPT | Mod: CPTII,S$GLB,, | Performed by: INTERNAL MEDICINE

## 2018-09-26 NOTE — PROGRESS NOTES
Subjective:       Patient ID: Chelsie Foster is a 62 y.o. female.    Chief Complaint: Annual Exam and Fatigue    HPI   Not working as much at work.    Schizophrenic son lives with her.  Helps out with ill mother.      H/o hypothyroidism, htn and hyperlipidemia.     Controlled with dyazide, atenolol and Pravastatin    Intermittent anxiety, for which she occas takes ativan.    DCIS of breast.  Recent nl MG.    Review of Systems   Constitutional: Negative for fever and unexpected weight change.   HENT: Negative for congestion and postnasal drip.    Eyes: Negative for pain, discharge and visual disturbance.   Respiratory: Negative for cough, chest tightness, shortness of breath and wheezing.    Cardiovascular: Negative for chest pain and leg swelling.   Gastrointestinal: Negative for abdominal pain, constipation, diarrhea and nausea.   Genitourinary: Negative for difficulty urinating, dysuria and hematuria.   Skin: Negative for rash.   Neurological: Negative for headaches.   Psychiatric/Behavioral: Negative for dysphoric mood and sleep disturbance. The patient is not nervous/anxious.        Objective:      Physical Exam   Constitutional: She is oriented to person, place, and time. She appears well-developed and well-nourished. No distress.   HENT:   Head: Normocephalic and atraumatic.   Right Ear: External ear normal.   Left Ear: External ear normal.   Nose: Nose normal.   Mouth/Throat: Oropharynx is clear and moist.   Eyes: Conjunctivae and EOM are normal. Pupils are equal, round, and reactive to light. Right eye exhibits no discharge. Left eye exhibits no discharge. No scleral icterus.   Neck: Normal range of motion. Neck supple. No JVD present. No thyromegaly present.   Cardiovascular: Normal rate, regular rhythm and normal heart sounds. Exam reveals no gallop.   No murmur heard.  Pulmonary/Chest: Effort normal and breath sounds normal. No respiratory distress. She has no wheezes. She has no rales.   Abdominal:  Soft. Bowel sounds are normal. She exhibits no distension and no mass. There is no tenderness. There is no rebound and no guarding.   Musculoskeletal: Normal range of motion. She exhibits no edema or tenderness.   Lymphadenopathy:     She has no cervical adenopathy.   Neurological: She is alert and oriented to person, place, and time. No cranial nerve deficit. Coordination normal.   Skin: Skin is warm and dry. No rash noted.   Psychiatric: She has a normal mood and affect. Her behavior is normal. Judgment and thought content normal.       Results for orders placed or performed in visit on 08/27/18   CBC auto differential   Result Value Ref Range    WBC 8.60 3.90 - 12.70 K/uL    RBC 4.68 4.00 - 5.40 M/uL    Hemoglobin 13.1 12.0 - 16.0 g/dL    Hematocrit 40.5 37.0 - 48.5 %    MCV 87 82 - 98 fL    MCH 28.0 27.0 - 31.0 pg    MCHC 32.3 32.0 - 36.0 g/dL    RDW 15.3 (H) 11.5 - 14.5 %    Platelets 300 150 - 350 K/uL    MPV 9.7 9.2 - 12.9 fL    Immature Granulocytes 0.3 0.0 - 0.5 %    Gran # (ANC) 4.9 1.8 - 7.7 K/uL    Immature Grans (Abs) 0.03 0.00 - 0.04 K/uL    Lymph # 2.6 1.0 - 4.8 K/uL    Mono # 0.7 0.3 - 1.0 K/uL    Eos # 0.3 0.0 - 0.5 K/uL    Baso # 0.05 0.00 - 0.20 K/uL    nRBC 0 0 /100 WBC    Gran% 56.4 38.0 - 73.0 %    Lymph% 30.7 18.0 - 48.0 %    Mono% 8.4 4.0 - 15.0 %    Eosinophil% 3.6 0.0 - 8.0 %    Basophil% 0.6 0.0 - 1.9 %    Differential Method Automated    Comprehensive metabolic panel   Result Value Ref Range    Sodium 141 136 - 145 mmol/L    Potassium 4.1 3.5 - 5.1 mmol/L    Chloride 101 95 - 110 mmol/L    CO2 32 (H) 23 - 29 mmol/L    Glucose 103 70 - 110 mg/dL    BUN, Bld 23 8 - 23 mg/dL    Creatinine 0.9 0.5 - 1.4 mg/dL    Calcium 10.3 8.7 - 10.5 mg/dL    Total Protein 7.9 6.0 - 8.4 g/dL    Albumin 3.8 3.5 - 5.2 g/dL    Total Bilirubin 0.3 0.1 - 1.0 mg/dL    Alkaline Phosphatase 114 55 - 135 U/L    AST 21 10 - 40 U/L    ALT 26 10 - 44 U/L    Anion Gap 8 8 - 16 mmol/L    eGFR if African American >60.0  >60 mL/min/1.73 m^2    eGFR if non African American >60.0 >60 mL/min/1.73 m^2   TSH   Result Value Ref Range    TSH 1.000 0.400 - 4.000 uIU/mL   Lipid panel   Result Value Ref Range    Cholesterol 200 (H) 120 - 199 mg/dL    Triglycerides 186 (H) 30 - 150 mg/dL    HDL 42 40 - 75 mg/dL    LDL Cholesterol 120.8 63.0 - 159.0 mg/dL    HDL/Chol Ratio 21.0 20.0 - 50.0 %    Total Cholesterol/HDL Ratio 4.8 2.0 - 5.0    Non-HDL Cholesterol 158 mg/dL     Assessment:       1. Annual physical exam    2. Hypothyroidism due to acquired atrophy of thyroid    3. Essential hypertension    4. Hyperlipidemia, unspecified hyperlipidemia type    5. Family history of colon cancer    6. Gastroesophageal reflux disease without esophagitis    7. Stage 1 infiltrating ductal carcinoma of right female breast        Plan:       Chelsie was seen today for annual exam and fatigue.    Diagnoses and all orders for this visit:    Annual physical exam    Hypothyroidism due to acquired atrophy of thyroid    Essential hypertension    Hyperlipidemia, unspecified hyperlipidemia type    Family history of colon cancer    Gastroesophageal reflux disease without esophagitis    Stage 1 infiltrating ductal carcinoma of right female breast       Try to reduce protonix to qod.     Encouraged to f/u Marlena Groves.     Regular exercise.  Wt loss.

## 2018-10-23 RX ORDER — PRAVASTATIN SODIUM 40 MG/1
TABLET ORAL
Qty: 90 TABLET | Refills: 3 | Status: SHIPPED | OUTPATIENT
Start: 2018-10-23 | End: 2019-12-27

## 2018-11-15 RX ORDER — PANTOPRAZOLE SODIUM 40 MG/1
TABLET, DELAYED RELEASE ORAL
Qty: 30 TABLET | Refills: 0 | Status: SHIPPED | OUTPATIENT
Start: 2018-11-15 | End: 2018-11-19 | Stop reason: SDUPTHER

## 2018-11-17 RX ORDER — LEVOTHYROXINE SODIUM 112 UG/1
TABLET ORAL DAILY
Qty: 90 TABLET | Refills: 3 | Status: CANCELLED | OUTPATIENT
Start: 2018-11-17 | End: 2019-11-17

## 2018-11-19 ENCOUNTER — PATIENT MESSAGE (OUTPATIENT)
Dept: INTERNAL MEDICINE | Facility: CLINIC | Age: 63
End: 2018-11-19

## 2018-11-19 RX ORDER — PANTOPRAZOLE SODIUM 40 MG/1
TABLET, DELAYED RELEASE ORAL
Qty: 90 TABLET | Refills: 3 | Status: SHIPPED | OUTPATIENT
Start: 2018-11-19 | End: 2020-02-27 | Stop reason: SDUPTHER

## 2018-11-19 RX ORDER — LEVOTHYROXINE SODIUM 112 UG/1
TABLET ORAL
Qty: 90 TABLET | Refills: 3 | Status: SHIPPED | OUTPATIENT
Start: 2018-11-19 | End: 2019-11-15

## 2018-11-19 NOTE — TELEPHONE ENCOUNTER
----- Message from Joyce Back sent at 11/17/2018 10:44 AM CST -----  Contact: patient at work  [phone]   Pt is having problems getting her medication because some of them are labeled as discontinued. Pt doesn't understand why this happened and also said that this is the not the first rx that has been discontinued. The pharmacy told her that the ma Miracle WILSON is the one who called the pharmacy . Pt would like to speak with  about this.

## 2018-11-19 NOTE — TELEPHONE ENCOUNTER
Called and spoke to pt and apologized for the mix up but stated that I did not speak to pt's pharmacy last week to discontinue any medication. Advised that pantoprazole was sent in by Dr. Berman since Dr. Carrillo was out of the office on Thursday. Pt is requesting a 90 supply on pantoprazole and levothyroxine with refills. Pended medications and apologized to pt again.

## 2018-11-20 ENCOUNTER — OFFICE VISIT (OUTPATIENT)
Dept: OPTOMETRY | Facility: CLINIC | Age: 63
End: 2018-11-20
Payer: COMMERCIAL

## 2018-11-20 DIAGNOSIS — H43.813 VITREOUS DETACHMENT OF BOTH EYES: Primary | ICD-10-CM

## 2018-11-20 DIAGNOSIS — H25.13 NUCLEAR SCLEROSIS OF BOTH EYES: ICD-10-CM

## 2018-11-20 DIAGNOSIS — Z13.5 SCREENING FOR EYE CONDITION: ICD-10-CM

## 2018-11-20 PROCEDURE — 92014 COMPRE OPH EXAM EST PT 1/>: CPT | Mod: S$GLB,,, | Performed by: OPTOMETRIST

## 2018-11-20 PROCEDURE — 99999 PR PBB SHADOW E&M-EST. PATIENT-LVL III: CPT | Mod: PBBFAC,,, | Performed by: OPTOMETRIST

## 2018-11-20 NOTE — PATIENT INSTRUCTIONS
Recent posterior vitreous detachment in the right eye, with secondary vitreous floater(s).  No evidence of secondary retinal tear/hole/break.  Past history of vitreous detachment in the left eye, with secondary vitreous hemorrhage.  No evidence of vitreous hemorrhage on examination today.  Trace nuclear sclerosis of lens of both eyes, and minimal axial posterior subcapsular lens changes/cataract in both eyes.  Monitor only.   Otherwise, ocular health appears good in each eye.  Call/return if notes any increase in floaters, or increase in flashes, or decrease in VA.      Post contact lens refraction not done.  Suggest she return at later date without CLs in for check of refraction and spectacle lens Rx for use in lieu of CLs    Good contact lens fit with present RGP CLs.   Wearing CLs well in each eye.   Showing need for minimal power change in the right lens per over-refraction.  Power of the left lens fine as is.  No need to replace either CL at this time.  New CL Rx entered into record for later use.  Copy given to Ms. Foster.    Repeat general eye exam and contact lens follow-up in one year.

## 2018-11-20 NOTE — PROGRESS NOTES
"HPI     Eye Problem      Additional comments: Noted flashing of light in right eye a few days   ago, with some blurring of vision, although somewhat better today than   yesterday.  Floaters.    Appears worse upon awakening in AM.  Flashes gone.  Note h/o similar symptoms in OS, with vitreous hemorrhage              Comments     Patient in for [facility] appointment.  See notes above    Being followed for (diagnosis):   Flashes in OD a few days ago.  Notes   floaters.     Similar history in OS within past year, with vitreous hemorrhage.    Date last seen:  04/2018    Doctor last seen:  Dr. Hopkins - previously saw Dr. Iqbal who diagnosed   vitreous detachment in OS    Prescribed eye medications(s) using:  none    OTC eye medication(s) using:  None, but was told of dry eye - but no dry   eye symptoms     Signs/symptoms of condition resolved/better/stable/worse?:  See notes   above    Allergies/Medications reviewed today?  Yes    No diabetes  + HBP.  Takes meds. States well-controlled.     PD 56/53  Reading distance = 16.25"                  Last edited by Mat Ortiz, OD on 11/20/2018  9:08 AM. (History)            Assessment /Plan     For exam results, see Encounter Report.    1. Vitreous detachment of both eyes     2. Nuclear sclerosis of both eyes     3. Screening for eye condition                  Recent posterior vitreous detachment in the right eye, with secondary vitreous floater(s).  No evidence of secondary retinal tear/hole/break.  Past history of vitreous detachment in the left eye, with secondary vitreous hemorrhage.  No evidence of vitreous hemorrhage on examination today.  Trace nuclear sclerosis of lens of both eyes, and minimal axial posterior subcapsular lens changes/cataract in both eyes.  Monitor only.   Otherwise, ocular health appears good in each eye.  Call/return if notes any increase in floaters, or increase in flashes, or decrease in VA.      Post contact lens refraction not done.  Suggest " she return at later date without CLs in for check of refraction and spectacle lens Rx for use in lieu of CLs    Good contact lens fit with present RGP CLs.   Wearing CLs well in each eye.   Showing need for minimal power change in the right lens per over-refraction.  Power of the left lens fine as is.  No need to replace either CL at this time.  New CL Rx entered into record for later use.  Copy given to Ms. Foster.    Repeat general eye exam and contact lens follow-up in one year.

## 2019-01-22 RX ORDER — LORAZEPAM 1 MG/1
TABLET ORAL
Qty: 30 TABLET | Refills: 1 | Status: SHIPPED | OUTPATIENT
Start: 2019-01-22 | End: 2019-10-10 | Stop reason: SDUPTHER

## 2019-03-13 ENCOUNTER — TELEPHONE (OUTPATIENT)
Dept: SURGERY | Facility: CLINIC | Age: 64
End: 2019-03-13

## 2019-03-13 NOTE — TELEPHONE ENCOUNTER
Returned call to adrianne regarding message left New  Right Breast Pain. Patient is due in April for her MMG but doesn't want to wait due to this new finding in right breast.Scheduled apt on 3/15/19 @ 9:30 am. Patient was informed of this apt date & time.

## 2019-03-14 ENCOUNTER — OFFICE VISIT (OUTPATIENT)
Dept: OPTOMETRY | Facility: CLINIC | Age: 64
End: 2019-03-14
Payer: COMMERCIAL

## 2019-03-14 DIAGNOSIS — Z46.0 ENCOUNTER FOR FITTING OR ADJUSTMENT OF SPECTACLES OR CONTACT LENSES: Primary | ICD-10-CM

## 2019-03-14 PROCEDURE — 99499 NO LOS: ICD-10-PCS | Mod: S$GLB,,, | Performed by: OPTOMETRIST

## 2019-03-14 PROCEDURE — 92499 UNLISTED OPH SVC/PROCEDURE: CPT | Mod: ,,, | Performed by: OPTOMETRIST

## 2019-03-14 PROCEDURE — 99499 UNLISTED E&M SERVICE: CPT | Mod: S$GLB,,, | Performed by: OPTOMETRIST

## 2019-03-14 PROCEDURE — 92499 PR CONTACT LENS F/U LEV 1: ICD-10-PCS | Mod: ,,, | Performed by: OPTOMETRIST

## 2019-03-14 NOTE — PATIENT INSTRUCTIONS
RGP CL wearer.  Recently lost left lens, and replaced.  Finds the left lens uncomfortable even shortly after lens insertion.  Check CL fit - looks satisfactory in each eye.   Suspect edge problem with new left lens.  Send new left lens back to CL department with instruction to polish edge of the new left lens - or to remake lens.      Ms. Snavely reminded that when new left lens arrives, she is to begin with only limited wearing time in the left eye (3 -4 hours), and to build wearing time gradually (one hour per day) until she reaches normal full-time wear.  Return if problems persist with the left lens thereafter.

## 2019-03-14 NOTE — PROGRESS NOTES
HPI     Eye Problem      Additional comments: New CL OS Problem              Comments     Patient in for contact lens follow-up.    Contact lenses patient currently wearing:  OD -3.00                                                                       OS   -2.50    Patient's report on visual acuity with contact lenses:  Distance:  good                                                                                          Near good    Any problems with Contact Lens comfort?  FB sensation OS since putting in   the new RGP CL    Contact lens wearing time (hours/per day): While awake    How long have Contact Lenses been in today?  1 hour    Does patient sleep with the contact lenses in?  No    Contact lens care system/solution used?                         Last edited by Jesus Casey on 3/14/2019  8:49 AM. (History)            Assessment /Plan     For exam results, see Encounter Report.    1. Encounter for fitting or adjustment of spectacles or contact lenses                    RGP CL wearer.  Recently lost left lens, and replaced.  Finds the left lens uncomfortable even shortly after lens insertion.  Check CL fit - looks satisfactory in each eye.   Suspect edge problem with new left lens.  Send new left lens back to CL department with instruction to polish edge of the new left lens - or to remake lens.      Ms. Martinvely reminded that when new left lens arrives, she is to begin with only limited wearing time in the left eye (3 -4 hours), and to build wearing time gradually (one hour per day) until she reaches normal full-time wear.  Return if problems persist with the left lens thereafter.

## 2019-03-15 ENCOUNTER — HOSPITAL ENCOUNTER (OUTPATIENT)
Dept: RADIOLOGY | Facility: HOSPITAL | Age: 64
Discharge: HOME | End: 2019-03-15
Attending: NURSE PRACTITIONER
Payer: COMMERCIAL

## 2019-03-15 ENCOUNTER — OFFICE VISIT (OUTPATIENT)
Dept: SURGERY | Facility: CLINIC | Age: 64
End: 2019-03-15
Payer: COMMERCIAL

## 2019-03-15 VITALS
BODY MASS INDEX: 26.84 KG/M2 | TEMPERATURE: 99 F | HEIGHT: 66 IN | DIASTOLIC BLOOD PRESSURE: 83 MMHG | WEIGHT: 167 LBS | SYSTOLIC BLOOD PRESSURE: 132 MMHG

## 2019-03-15 DIAGNOSIS — Z80.3 FAMILY HISTORY OF BREAST CANCER: ICD-10-CM

## 2019-03-15 DIAGNOSIS — N63.10 LUMP OF RIGHT BREAST: ICD-10-CM

## 2019-03-15 DIAGNOSIS — N64.4 BREAST PAIN: ICD-10-CM

## 2019-03-15 DIAGNOSIS — N63.10 LUMP OF RIGHT BREAST: Primary | ICD-10-CM

## 2019-03-15 DIAGNOSIS — Z85.3 PERSONAL HISTORY OF BREAST CANCER: ICD-10-CM

## 2019-03-15 DIAGNOSIS — R22.1 LUMP IN NECK: ICD-10-CM

## 2019-03-15 PROCEDURE — G0279 MAMMO DIGITAL DIAGNOSTIC RIGHT WITH TOMOSYNTHESIS_CAD: ICD-10-PCS | Mod: 26,,, | Performed by: RADIOLOGY

## 2019-03-15 PROCEDURE — 3008F BODY MASS INDEX DOCD: CPT | Mod: CPTII,S$GLB,, | Performed by: NURSE PRACTITIONER

## 2019-03-15 PROCEDURE — 3079F DIAST BP 80-89 MM HG: CPT | Mod: CPTII,S$GLB,, | Performed by: NURSE PRACTITIONER

## 2019-03-15 PROCEDURE — 77061 BREAST TOMOSYNTHESIS UNI: CPT | Mod: TC,PO

## 2019-03-15 PROCEDURE — 76642 US BREAST RIGHT LIMITED: ICD-10-PCS | Mod: 26,RT,, | Performed by: RADIOLOGY

## 2019-03-15 PROCEDURE — G0279 TOMOSYNTHESIS, MAMMO: HCPCS | Mod: 26,,, | Performed by: RADIOLOGY

## 2019-03-15 PROCEDURE — 76642 ULTRASOUND BREAST LIMITED: CPT | Mod: 26,RT,, | Performed by: RADIOLOGY

## 2019-03-15 PROCEDURE — 99999 PR PBB SHADOW E&M-EST. PATIENT-LVL III: CPT | Mod: PBBFAC,,, | Performed by: NURSE PRACTITIONER

## 2019-03-15 PROCEDURE — 76642 ULTRASOUND BREAST LIMITED: CPT | Mod: TC,PO,RT

## 2019-03-15 PROCEDURE — 3008F PR BODY MASS INDEX (BMI) DOCUMENTED: ICD-10-PCS | Mod: CPTII,S$GLB,, | Performed by: NURSE PRACTITIONER

## 2019-03-15 PROCEDURE — 99999 PR PBB SHADOW E&M-EST. PATIENT-LVL III: ICD-10-PCS | Mod: PBBFAC,,, | Performed by: NURSE PRACTITIONER

## 2019-03-15 PROCEDURE — 3075F PR MOST RECENT SYSTOLIC BLOOD PRESS GE 130-139MM HG: ICD-10-PCS | Mod: CPTII,S$GLB,, | Performed by: NURSE PRACTITIONER

## 2019-03-15 PROCEDURE — 77065 MAMMO DIGITAL DIAGNOSTIC RIGHT WITH TOMOSYNTHESIS_CAD: ICD-10-PCS | Mod: 26,,, | Performed by: RADIOLOGY

## 2019-03-15 PROCEDURE — 99213 PR OFFICE/OUTPT VISIT, EST, LEVL III, 20-29 MIN: ICD-10-PCS | Mod: S$GLB,,, | Performed by: NURSE PRACTITIONER

## 2019-03-15 PROCEDURE — 77065 DX MAMMO INCL CAD UNI: CPT | Mod: TC,PO

## 2019-03-15 PROCEDURE — 3075F SYST BP GE 130 - 139MM HG: CPT | Mod: CPTII,S$GLB,, | Performed by: NURSE PRACTITIONER

## 2019-03-15 PROCEDURE — 99213 OFFICE O/P EST LOW 20 MIN: CPT | Mod: S$GLB,,, | Performed by: NURSE PRACTITIONER

## 2019-03-15 PROCEDURE — 77065 DX MAMMO INCL CAD UNI: CPT | Mod: 26,,, | Performed by: RADIOLOGY

## 2019-03-15 PROCEDURE — 3079F PR MOST RECENT DIASTOLIC BLOOD PRESSURE 80-89 MM HG: ICD-10-PCS | Mod: CPTII,S$GLB,, | Performed by: NURSE PRACTITIONER

## 2019-03-15 NOTE — PROGRESS NOTES
"Patient ID: Chelsie Foster is a 63 y.o. female.    Chief Complaint: Breast Pain (Right Breast Lump )        HPI:  Established patient of the Department of Breast Surgery, previously seen by PHIL Camp, and new to me, presents today for breast lump.    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 Negative SLNB. Adjuvant XRT via mammosite. No chemotherapy.     Interval Breast History    Patient reports right breast lump, first noticed 1-2mos ago, in UOQ, not increasing in size or changing in any way, no associated skin changes, somewhat painful but pt attributes the pain to her frequently pressing on the lump.      Has had bilat, diffuse, intermittent breast pain for several years, at least since time of breast cancer diagnosis, right > left, Tylenol helps.    Patient denies breast-related skin changes, nipple discharge and nipple retraction.  Patient denies breast mass or breast pain other than abovementioned.     Breast Imaging  Last mmg (bilat) 4/23/18 report reviewed:  scattered areas of fibroglandular density  BI-RADS 2    GYN History:  Uterus and ovaries:  intact  HRT usage:  used Premarin for 3-6 mos, quit over 5 yrs ago    Other Oncologic History:    Personal history of other cancer not abovementioned:  no    Personal history of genetic testing:  Pt states she had "negative genetic testing" but unable to locate results today in computer    Social History:  Tobacco use:  never smoker    Family Oncologic History:    Ash[employer]azi [employer] ancestry:  no    Family History   Problem Relation Age of Onset    Breast cancer Mother 60    Cancer Mother         Basal cell skin CA    Colon cancer Father     Cancer Father         colon CA and Basal Cell skin CA    Breast cancer Maternal Aunt 50    Cancer Maternal Aunt         Colon CA    Breast cancer Cousin 49        " maternal    Cancer Maternal Cousin Pt denies this lymphoma    Ovarian cancer Neg Hx      History of genetic testing in relatives:  no      Review of Systems - See HPI for breast ROS.  Constitutional:  Negative for unexplained weight loss.  Respiratory:  Negative for hemoptysis or new, worsening, or changing dyspnea.  Musculoskeletal:  Negative for new, worsening, or changing bone pain.    Neurological:  Negative for new, worsening, or changing headaches.  Positive for headache, mild, x 1 yr or longer, pt attributes to Protonix, headache do not occur every day, probably occur once every 1-2 wks, not getting worse or more frequent.   Gastrointestinal:  Negative for new, worsening, or changing abdominal pain.        Objective:   Physical Exam   Pulmonary/Chest: Effort normal. No respiratory distress. She exhibits no mass, no edema and no deformity. Right breast exhibits mass and tenderness. Right breast exhibits no inverted nipple, no nipple discharge and no skin change. Left breast exhibits no inverted nipple, no mass, no nipple discharge, no skin change and no tenderness. Breasts are symmetrical. There is no breast swelling.   Right breast 11oc region about 9cmfn with patient-palpated lump, also appreciated by me, 2.75cm x 2.5cm, nontender, near axillary well healed scar, c/w scar tissue, low clinical suspicion, marked for imaging.  Right chest wall 1oc region with small nodularity about 15cmfn, flat, 2-3mm, TTP, possibly inframammary LN vs glandular tissue vs other normal anatomy, low clinical suspicion, marked for imaging.   Lymphadenopathy:     She has no cervical adenopathy.     She has no axillary adenopathy.        Right: No supraclavicular adenopathy present.        Left: No supraclavicular adenopathy present.   Right posterior triangle / supraclavicular region with subcentimeter mobile finding, nontender, likely normal LN vs other normal anatomy, low clinical suspicion.     Assessment:       1. Lump of  right breast    2. Lump in neck    3. Breast pain    4. Personal history of breast cancer    5. Family history of breast cancer          Plan:     Right mmg and right breast ltd US ordered today for right breast 11oc and right CW 1oc.  If negative/benign, pt to RTC as below.  Discussed with pt that she is due for her annual bilat mmg around 4/24/19 and that I recommend doing bilat mmg at this time as her annual; she prefers to wait on left mmg until around 4/24/19.    Discussed that she can try well supportive and compressive sports bra for bilat, diffuse, intermittent breast pain.  No worrisome findings to left breast or worrisome findings to right breast other than abovementioned.  If today's imaging is normal, not suggestive of pathology given pain is bilat, diffuse, and intermittent.     Neck US ordered today for right neck finding.  If negative/benign, pt to RTC as below.    Positive for headache, mild, x 1 yr or longer, pt attributes to Protonix, headache do not occur every day, probably occur once every 1-2 wks, not getting worse or more frequent.   Offered brain MRI to r/o breast cancer mets at this time, though this is not highly suspected given reported characteristics of h/a.  Pt prefers close clinical f/u.  She is RTC around 4/24/19 for reevaluation.  She states h/a is never severe.    Encouraged breast awareness, including monthly breast self-exams.  Advised patient to RTC with any interval changes or concerns, whether breast-related or systemic.  Questions were encouraged and answered to patient's satisfaction, and patient verbalized understanding of information and agreement with the plan.

## 2019-03-19 NOTE — PROGRESS NOTES
Notified pt of results.    Simran,    Pt needs to be scheduled for neck ultrasound (already ordered).    Pt needs to RTC to see me around 4/24/19 for exam and left mammogram.      Please call pt to schedule above.    Thanks,  Jared

## 2019-03-21 ENCOUNTER — TELEPHONE (OUTPATIENT)
Dept: SURGERY | Facility: CLINIC | Age: 64
End: 2019-03-21

## 2019-03-21 NOTE — TELEPHONE ENCOUNTER
Called  regarding Jared Moya NP asking that patient schedule and apt around 4/24/19. On this apt Left mammogram will be done also a Breast Exam. Message was left for patient to please call Simran dean @ [phone]. I will be happy to schedule this apt .

## 2019-05-02 ENCOUNTER — OFFICE VISIT (OUTPATIENT)
Dept: SURGERY | Facility: CLINIC | Age: 64
End: 2019-05-02
Payer: COMMERCIAL

## 2019-05-02 ENCOUNTER — HOSPITAL ENCOUNTER (OUTPATIENT)
Dept: RADIOLOGY | Facility: HOSPITAL | Age: 64
Discharge: HOME | End: 2019-05-02
Attending: NURSE PRACTITIONER
Payer: COMMERCIAL

## 2019-05-02 ENCOUNTER — TELEPHONE (OUTPATIENT)
Dept: INTERNAL MEDICINE | Facility: CLINIC | Age: 64
End: 2019-05-02

## 2019-05-02 VITALS
SYSTOLIC BLOOD PRESSURE: 122 MMHG | HEART RATE: 71 BPM | BODY MASS INDEX: 26.84 KG/M2 | HEIGHT: 66 IN | TEMPERATURE: 98 F | DIASTOLIC BLOOD PRESSURE: 74 MMHG | WEIGHT: 167 LBS

## 2019-05-02 DIAGNOSIS — Z85.3 PERSONAL HISTORY OF BREAST CANCER: ICD-10-CM

## 2019-05-02 DIAGNOSIS — Z80.3 FAMILY HISTORY OF BREAST CANCER: ICD-10-CM

## 2019-05-02 DIAGNOSIS — Z82.49 FAMILY HISTORY OF CARDIAC DISORDER: ICD-10-CM

## 2019-05-02 DIAGNOSIS — Z85.3 PERSONAL HISTORY OF BREAST CANCER: Primary | ICD-10-CM

## 2019-05-02 PROCEDURE — 77065 DX MAMMO INCL CAD UNI: CPT | Mod: TC,PO,LT

## 2019-05-02 PROCEDURE — 77065 MAMMO DIGITAL DIAGNOSTIC LEFT WITH TOMOSYNTHESIS_CAD: ICD-10-PCS | Mod: 26,LT,, | Performed by: RADIOLOGY

## 2019-05-02 PROCEDURE — 3078F PR MOST RECENT DIASTOLIC BLOOD PRESSURE < 80 MM HG: ICD-10-PCS | Mod: CPTII,S$GLB,, | Performed by: NURSE PRACTITIONER

## 2019-05-02 PROCEDURE — 99999 PR PBB SHADOW E&M-EST. PATIENT-LVL III: CPT | Mod: PBBFAC,,, | Performed by: NURSE PRACTITIONER

## 2019-05-02 PROCEDURE — 77065 DX MAMMO INCL CAD UNI: CPT | Mod: 26,LT,, | Performed by: RADIOLOGY

## 2019-05-02 PROCEDURE — 3008F BODY MASS INDEX DOCD: CPT | Mod: CPTII,S$GLB,, | Performed by: NURSE PRACTITIONER

## 2019-05-02 PROCEDURE — G0279 MAMMO DIGITAL DIAGNOSTIC LEFT WITH TOMOSYNTHESIS_CAD: ICD-10-PCS | Mod: 26,LT,, | Performed by: RADIOLOGY

## 2019-05-02 PROCEDURE — 99999 PR PBB SHADOW E&M-EST. PATIENT-LVL III: ICD-10-PCS | Mod: PBBFAC,,, | Performed by: NURSE PRACTITIONER

## 2019-05-02 PROCEDURE — 99213 OFFICE O/P EST LOW 20 MIN: CPT | Mod: S$GLB,,, | Performed by: NURSE PRACTITIONER

## 2019-05-02 PROCEDURE — 3008F PR BODY MASS INDEX (BMI) DOCUMENTED: ICD-10-PCS | Mod: CPTII,S$GLB,, | Performed by: NURSE PRACTITIONER

## 2019-05-02 PROCEDURE — 99213 PR OFFICE/OUTPT VISIT, EST, LEVL III, 20-29 MIN: ICD-10-PCS | Mod: S$GLB,,, | Performed by: NURSE PRACTITIONER

## 2019-05-02 PROCEDURE — 3074F SYST BP LT 130 MM HG: CPT | Mod: CPTII,S$GLB,, | Performed by: NURSE PRACTITIONER

## 2019-05-02 PROCEDURE — G0279 TOMOSYNTHESIS, MAMMO: HCPCS | Mod: 26,LT,, | Performed by: RADIOLOGY

## 2019-05-02 PROCEDURE — 3074F PR MOST RECENT SYSTOLIC BLOOD PRESSURE < 130 MM HG: ICD-10-PCS | Mod: CPTII,S$GLB,, | Performed by: NURSE PRACTITIONER

## 2019-05-02 PROCEDURE — 3078F DIAST BP <80 MM HG: CPT | Mod: CPTII,S$GLB,, | Performed by: NURSE PRACTITIONER

## 2019-05-02 NOTE — PROGRESS NOTES
Patient ID: Chelsie Foster is a 63 y.o. female.    Chief Complaint: Follow-up (F/U CBE )        HPI:  Established patient of the Department of Breast Surgery, previously seen by PHIL Camp, and new to me, presents today for breast cancer surveillance.    Breast History:     History of triple-negative IDC on core biopsy right breast 4/2015 5/2015 right lumpectomy with no residual invasive carcinoma, focus of DCIS, negative margins. Negative SLNB. Adjuvant XRT via mammosite. No chemotherapy.      Interval Breast History     3/15/19 clinic visit with me:  HPI  Patient reports right breast lump, first noticed 1-2mos ago, in UOQ, not increasing in size or changing in any way, no associated skin changes, somewhat painful but pt attributes the pain to her frequently pressing on the lump.     Has had bilat, diffuse, intermittent breast pain for several years, at least since time of breast cancer diagnosis, right > left, Tylenol helps.   Patient denies breast-related skin changes, nipple discharge and nipple retraction.  Patient denies breast mass or breast pain other than abovementioned.   CBE  Right breast 11oc region about 9cmfn with patient-palpated lump, also appreciated by me, 2.75cm x 2.5cm, nontender, near axillary well healed scar, c/w scar tissue, low clinical suspicion, marked for imaging.  Right chest wall 1oc region with small nodularity about 15cmfn, flat, 2-3mm, TTP, possibly inframammary LN vs glandular tissue vs other normal anatomy, low clinical suspicion, marked for imaging.    Right posterior triangle / supraclavicular region with subcentimeter mobile finding, nontender, likely normal LN vs other normal anatomy, low clinical suspicion.   Imaging  See below.  BI-RADS 2  Plan  Right mmg and right breast ltd US ordered today for right breast 11oc and right CW 1oc.  If negative/benign, pt to RTC as below.  Discussed with pt that she is due for her annual bilat mmg around 4/24/19 and that I  recommend doing bilat mmg at this time as her annual; she prefers to wait on left mmg until around 4/24/19.   Discussed that she can try well supportive and compressive sports bra for bilat, diffuse, intermittent breast pain.  No worrisome findings to left breast or worrisome findings to right breast other than abovementioned.  If today's imaging is normal, not suggestive of pathology given pain is bilat, diffuse, and intermittent.   Neck US ordered today for right neck finding.  If negative/benign, pt to RTC as below.   Positive for headache, mild, x 1 yr or longer, pt attributes to Protonix, headache do not occur every day, probably occur once every 1-2 wks, not getting worse or more frequent.   Offered brain MRI to r/o breast cancer mets at this time, though this is not highly suspected given reported characteristics of h/a.  Pt prefers close clinical f/u.  She is RTC around 4/24/19 for reevaluation.  She states h/a is never severe.    Today:  Pt feels that abovementioned R breast lump has resolved.  Bilat breast pain is still intermittent and has improved with sports bra wearing.  No other breast lumps or breast pain.  No breast-related skin changes, nipple inversion, or nipple discharge.     Breast Imaging    3/15/19 R [facility] mmg & R breast ltd US report reviewed:  History:  Patient is 63 y.o. and is seen for a diagnostic mammogram. Palpable abnormalities right breast.   Films Compared:  Prior images (if available) were compared.   Findings:  This procedure was performed using tomosynthesis.  Computer-aided detection was utilized in the interpretation of this examination.  Right  Mammo Digital Diagnostic Right w/ Nikolas  The breast has scattered areas of fibroglandular density.   There are post-surgical findings from a previous lumpectomy seen in the right breast. There has been no interval development of a suspicious mass, microcalcification, or architectural distortion.    US Breast Right Limited  No suspicious  "sonographic abnormality identified in regions examined.   There is no mammographic or sonographic abnormality to correlate with the palpable abnormality felt by the patient in the right breast, 11 o'clock   There is no mammographic or sonographic abnormality to correlate with the palpable abnormality felt by the referring provider in the right breast, 12-1 o'clock.    Impression:  There is no mammographic or sonographic evidence of malignancy.   BI-RADS Category:   Right: 2 - Benign  Overall: 2 - Benign   Recommendation:  Return to annual screening mammogram schedule is recommended (April 2019).    5/2/19 L diag mmg report reviewed:  BI-RADS 1     GYN History:  Uterus and ovaries:  intact  HRT usage:  used Premarin for 3-6 mos, quit over 5 yrs ago     Other Oncologic History:     Personal history of other cancer not abovementioned:  no     Personal history of genetic testing:  Pt states she had "negative genetic testing" but unable to locate results today in computer     Social History:  Tobacco use:  never smoker     Family Oncologic History:     Ashkenazi [employer] ancestry:  no      Family History   Problem Relation Age of Onset    Breast cancer Mother 60    Cancer Mother         Basal cell skin CA    Colon cancer Father     Cancer Father         colon CA and Basal Cell skin CA    Breast cancer Maternal Aunt 50    Cancer Maternal Aunt         Colon CA    Breast cancer Cousin 49        maternal    Cancer Maternal Cousin     Ovarian cancer Neg Hx      History of genetic testing in relatives:  no        Review of Systems - See HPI for breast ROS.  Constitutional:  Negative for unexplained weight loss, unexplained fatigue, or recurrent infection.  Respiratory:  Negative for cough, hemoptysis, or dyspnea.  Musculoskeletal:  Negative for bone pain.    Neurological:  Negative for new, worsening, or changing headaches.  Positive for headache, mild, x 1 yr or longer, pt attributes to Protonix, headache do not occur " "every day, probably occur once every 1-2 wks, not getting worse or more frequent.  Less frequent since last clinic visit with me.  Gastrointestinal:  Negative for abdominal pain.  Cardiovascular:  Pt occasionally "possibly" gets CP assoc with her acid reflux/indigestion.  Pt unsure of laterality.  Never radiates down LUE.          Objective:   Physical Exam   Cardiovascular: Normal rate, regular rhythm and normal heart sounds.    Pulmonary/Chest: Effort normal. No respiratory distress. She has wheezes in the right upper field. She exhibits no mass, no edema and no deformity. Right breast exhibits no inverted nipple, no mass, no nipple discharge, no skin change and no tenderness. Left breast exhibits no inverted nipple, no mass, no nipple discharge, no skin change and no tenderness. No breast swelling.   R 11oc region about 9cmfn scar tissue unchanged since last CBE.   Genitourinary: No breast swelling.   Lymphadenopathy:     She has no cervical adenopathy.     She has no axillary adenopathy.        Right: No supraclavicular adenopathy present.        Left: No supraclavicular adenopathy present.   No palpable LN or LAD bilaterally.  LN in R aspect of neck no longer palpable.  No tenderness.        SpO2 96% on RA.  RR normal.  Breath sounds normal other than abovementioned wheeze.  HR normal.   Assessment:       1. Personal history of breast cancer    2. Family history of breast cancer    3. Family history of cardiac disorder          Plan:     Clinically [facility] today.  RTC in 6 mos to ensure no LAD.  Neck US no longer indicated at this time as palpable LN was of low clinical suspicion to begin with and is no longer palpable.    Pt to continue wearing sports bra with no underwire for pain relief.    R lump 11oc region c/w scar tissue/fibrosis given close proximity to well healed scar.  Unchanged.  Not suggestive of pathology.     H/a not suggestive of breast cancer mets given longevity, infrequent, and improved since " last visit.      Pt to f/u with PCP ASAP regarding wheeze and CP.  Messaged PCP Dr. Carrillo regarding this.  Placed referral to Cardiology for pt-reported fam hx of CV disease; provided pt with phone # to call to set up appt.  Advised pt to call 911 with ANY CP.    RTC in 6 mos for f/u.    RTC around 3/16/2020 for CBE and annual bilat mmg.    Encouraged breast awareness, including monthly breast self-exams.  Advised patient to RTC with any interval changes or concerns, whether breast-related or systemic.  Questions were encouraged and answered to patient's satisfaction, and patient verbalized understanding of information and agreement with the plan.

## 2019-05-02 NOTE — TELEPHONE ENCOUNTER
LVM for pt to call back the office to set up same day appt for chest discomfort and wheezing. Pt portal message sent as well, can see Marie today if pt agrees.

## 2019-05-02 NOTE — TELEPHONE ENCOUNTER
----- Message from Jared Moya DNP sent at 5/2/2019 10:12 AM CDT -----  Dr. Carrillo / Staff:    Saw pt at [facility] today.  She reported to me fam hx of CV disease and possible occasional CP, which she thinks might be associated with her acid reflux.  I placed a Cardiology referral but also wanted her to see her PCP regarding this as well as a right posterior wheeze noted on exam today.  If you would contact pt to schedule, I would appreciate it.    Thanks,  Jared Moya

## 2019-05-02 NOTE — TELEPHONE ENCOUNTER
----- Message from Jared Moya DNP sent at 5/2/2019 10:12 AM CDT -----  Dr. Carrillo / Staff:    Saw pt at [facility] today.  She reported to me fam hx of CV disease and possible occasional CP, which she thinks might be associated with her acid reflux.  I placed a Cardiology referral but also wanted her to see her PCP regarding this as well as a right posterior wheeze noted on exam today.  If you would contact pt to schedule, I would appreciate it.    Thanks,  Jared Moya

## 2019-05-06 RX ORDER — TRIAMTERENE AND HYDROCHLOROTHIAZIDE 37.5; 25 MG/1; MG/1
CAPSULE ORAL
Qty: 90 CAPSULE | Refills: 3 | Status: SHIPPED | OUTPATIENT
Start: 2019-05-06 | End: 2020-07-22 | Stop reason: SDUPTHER

## 2019-06-13 RX ORDER — ATENOLOL 25 MG/1
TABLET ORAL
Qty: 90 TABLET | Refills: 12 | Status: SHIPPED | OUTPATIENT
Start: 2019-06-13 | End: 2020-09-15

## 2019-09-17 ENCOUNTER — OFFICE VISIT (OUTPATIENT)
Dept: OBSTETRICS AND GYNECOLOGY | Facility: CLINIC | Age: 64
End: 2019-09-17
Payer: COMMERCIAL

## 2019-09-17 VITALS
DIASTOLIC BLOOD PRESSURE: 80 MMHG | BODY MASS INDEX: 26.75 KG/M2 | WEIGHT: 166.44 LBS | HEIGHT: 66 IN | SYSTOLIC BLOOD PRESSURE: 122 MMHG

## 2019-09-17 DIAGNOSIS — N95.9 MENOPAUSAL AND PERIMENOPAUSAL DISORDER: ICD-10-CM

## 2019-09-17 DIAGNOSIS — C50.911: ICD-10-CM

## 2019-09-17 DIAGNOSIS — Z01.419 VISIT FOR GYNECOLOGIC EXAMINATION: Primary | ICD-10-CM

## 2019-09-17 PROCEDURE — 3074F PR MOST RECENT SYSTOLIC BLOOD PRESSURE < 130 MM HG: ICD-10-PCS | Mod: CPTII,S$GLB,, | Performed by: OBSTETRICS & GYNECOLOGY

## 2019-09-17 PROCEDURE — 99999 PR PBB SHADOW E&M-EST. PATIENT-LVL III: ICD-10-PCS | Mod: PBBFAC,,, | Performed by: OBSTETRICS & GYNECOLOGY

## 2019-09-17 PROCEDURE — 99396 PR PREVENTIVE VISIT,EST,40-64: ICD-10-PCS | Mod: S$GLB,,, | Performed by: OBSTETRICS & GYNECOLOGY

## 2019-09-17 PROCEDURE — 99999 PR PBB SHADOW E&M-EST. PATIENT-LVL III: CPT | Mod: PBBFAC,,, | Performed by: OBSTETRICS & GYNECOLOGY

## 2019-09-17 PROCEDURE — 99396 PREV VISIT EST AGE 40-64: CPT | Mod: S$GLB,,, | Performed by: OBSTETRICS & GYNECOLOGY

## 2019-09-17 PROCEDURE — 3079F PR MOST RECENT DIASTOLIC BLOOD PRESSURE 80-89 MM HG: ICD-10-PCS | Mod: CPTII,S$GLB,, | Performed by: OBSTETRICS & GYNECOLOGY

## 2019-09-17 PROCEDURE — 3074F SYST BP LT 130 MM HG: CPT | Mod: CPTII,S$GLB,, | Performed by: OBSTETRICS & GYNECOLOGY

## 2019-09-17 PROCEDURE — 3079F DIAST BP 80-89 MM HG: CPT | Mod: CPTII,S$GLB,, | Performed by: OBSTETRICS & GYNECOLOGY

## 2019-09-17 NOTE — PROGRESS NOTES
HISTORY OF PRESENT ILLNESS:    Chelsie Foster is a 63 y.o. female , presents for a routine exam and has no complaints.  COTESTING DUE , BREAST SURGERY FOLLOWS MAMMO AND RECENT NL.  NO GYN C/O.  SISTER HAS MOVED WITH HORSES TO [place] AND SON WITH MENSTAL ILLNESS STILL LIVES WITH CHELSIE    LAST VISIT :  PAP SUBMITTED AND NEEDS MAMMO SCHEDULED.  SCARRING RIGHT UPPER INNER BREAST STABLE S/P RT.  NO GYN SX; STILL HAS OCCAS PELVIC PAIN BUT REASSURED USG LAST YEAR NEG AND VERY UNLIKELY DUE TO GYN CAUSE.  MILD VIRUS A WEEK AGO  LAST VISIT :   PAP DUE  AND BREAST TEAM F/U BREAST MONITORING - DX MAMMO DUE ON Monday.  OCCAS RLQ AND LLQ SHARP PAINS, ?? GI, GAS SOURCE BUT USG TO MONITOR.  NEW RELATIONSHIP, DISCUSSED HYPOALLERGENIC LUBRICANTS PRN  LAST WEEKEND CAMPING WITH SISTER AND MOTHER  LAST VISIT 3/2016:  4/15 TREATMENT FOR BREAST CA. PAP DUE  AND MAMMO HAS BEEN ORDERED FOR F/U BREAST CA LAST YEAR. OCCASIONAL RLQ AND LLQ PAINS, BRIEF AND NOT OVERALL WORSENING. ASSOCIATED WITH GAS AND REASSURED PELVIC EXAM WITHOUT MASSES; IF PERSISTS MAY MERIT A PELVIC USG BUT PLANS COLONOSCOPY SOON, SO WILL BE CONSCIENTIOUS ABOUT MEASURES TO MAINTAIN NORMAL BOWEL FUNCTION  LAST VISIT 2015:  Pap DUE AND REPEATED. MAMMO DUE 2015 AND ORDERS PLACED. HAS NO GYN C/O AND NOTES SOME DIARRHEA AND ABD PAIN WITH STRESS - REASSURED NO GYN ISSUES ON EXAM.   Last visit 10/2012:  HOT FLUSHES RARELY, WORSE WITH STRESS. STILL MUCH CAFFEINE INTAKE. MAMMO DUE MARCH AND HAD BMD LAST YEAR. PAP DONE AND DISCUSSED RECOMMENDATIONS FOR 3YR SCREENING  WORKS MEDICAL RECORDS - DISABILITY FORMS    Past Medical History:   Diagnosis Date    Anxiety     Breast cancer 2015    Right breast infiltrating ductal CA, ER/FL/Her2 negative    Depression     Genetic testing     see clinic notes 5-19-15 for details, VUS in [facility]    GERD (gastroesophageal reflux disease)     Hyperlipidemia     Hypertension     Hypothyroidism     PONV  (postoperative nausea and vomiting)     Thyroid disease     hypothryoidism       Past Surgical History:   Procedure Laterality Date    APPENDECTOMY      BIOPSY-SENTINEL NODE Right 5/15/2015    [facility] at [facility] OR 2ND FLR    BREAST BIOPSY Right 2015    [facility]     SECTION      COLONOSCOPY N/A 2016    Performed by YULIYA Sherman MD at [facility] ENDO (4TH FLR)    INJECTION-SENTINEL NODE Right 5/15/2015    [facility] OR 2ND FLR    LUMPECTOMY-BREAST - RIGHT BREAST WIRE LOC LUMPECTOMY WITH RIGHT SENTINEL LYMPH NODE BIOPSY.  POSSIBLE RIGHT AXILLARY LYMPH NODE DISSECTION. Right 5/15/2015    Performed by [facility] at [facility] OR 2ND FLR        MEDICATIONS AND ALLERGIES:      Current Outpatient Medications:     atenolol (TENORMIN) 25 MG tablet, TAKE 1 TABLET BY MOUTH ONCE DAILY, Disp: 90 tablet, Rfl: 12    levothyroxine (SYNTHROID) 112 MCG tablet, TAKE 1 TABLET BY MOUTH ONCE DAILY, Disp: 90 tablet, Rfl: 3    LORazepam (ATIVAN) 1 MG tablet, TAKE 1/2 TO 1 (ONE-HALF TO ONE) TABLET BY MOUTH ONCE DAILY IN THE EVENING, Disp: 30 tablet, Rfl: 1    pantoprazole (PROTONIX) 40 MG tablet, TAKE 1 TABLET BY MOUTH EVERY DAY, Disp: 90 tablet, Rfl: 3    pravastatin (PRAVACHOL) 40 MG tablet, TAKE ONE TABLET BY MOUTH EVERY DAY, Disp: 90 tablet, Rfl: 3    triamterene-hydrochlorothiazide 37.5-25 mg (DYAZIDE) 37.5-25 mg per capsule, TAKE 1 CAPSULE BY MOUTH EVERY DAY., Disp: 90 capsule, Rfl: 3    Review of patient's allergies indicates:   Allergen Reactions    No known drug allergies        Family History   Problem Relation Age of Onset    Hypertension Mother     Hyperlipidemia Mother     Breast cancer Mother 60    Cancer Mother         Basal cell skin CA    Depression Mother     Colon cancer Father     Dementia Father     Heart disease Father         chf    Cancer Father         colon CA and Basal Cell skin CA    Breast cancer Maternal Aunt 50     Cancer Maternal Aunt         Colon CA    Breast cancer Cousin 49        maternal    Hypertension Sister     Diabetes Sister     Hypertension Sister     Bipolar disorder Son     Mental illness Son         bipolar/schizoaffective    Cancer Maternal Cousin     Amblyopia Neg Hx     Blindness Neg Hx     Cataracts Neg Hx     Glaucoma Neg Hx     Macular degeneration Neg Hx     Retinal detachment Neg Hx     Strabismus Neg Hx     Stroke Neg Hx     Thyroid disease Neg Hx     Ovarian cancer Neg Hx        Social History     Socioeconomic History    Marital status:      Spouse name: Not on file    Number of children: 1    Years of education: Not on file    Highest education level: Not on file   Occupational History    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     Employer: [employer]   Social Needs    Financial resource strain: Not on file    Food insecurity:     Worry: Not on file     Inability: Not on file    Transportation needs:     Medical: Not on file     Non-medical: Not on file   Tobacco Use    Smoking status: Never Smoker    Smokeless tobacco: Never Used   Substance and Sexual Activity    Alcohol use: Yes     Comment: rarely    Drug use: No    Sexual activity: Yes     Partners: Male     Birth control/protection: Post-menopausal   Lifestyle    Physical activity:     Days per week: Not on file     Minutes per session: Not on file    Stress: Not on file   Relationships    Social connections:     Talks on phone: Not on file     Gets together: Not on file     Attends Jewish service: Not on file     Active member of club or organization: Not on file     Attends meetings of clubs or organizations: Not on file     Relationship status: Not on file   Other Topics Concern    Not on file   Social History Narrative    Walks a lot at work:  9-10 000 step s most days.       COMPREHENSIVE GYN HISTORY:  PAP History:  Denies abnormal Paps except a noted above.  Infection History: Denies STDs.  "Denies PID.  Benign History: Denies uterine fibroids. Denies ovarian cysts. Denies endometriosis.  Denies other conditions.  Cancer History: Denies cervical cancer. Denies uterine cancer or hyperplasia. Denies ovarian cancer. Denies vulvar cancer or pre-cancer. Denies vaginal cancer or pre-cancer. Denies breast cancer. Denies colon cancer.    ROS:  GENERAL: No weight changes. No swelling. No fatigue. No fever.  CARDIOVASCULAR: No chest pain. No shortness of breath. No leg cramps.   NEUROLOGICAL: No headaches. No vision changes.  BREASTS: No pain. No lumps. No discharge.  ABDOMEN: No pain. No nausea. No vomiting. No diarrhea. No constipation.  REPRODUCTIVE: No abnormal bleeding.   VULVA: No pain. No lesions. No itching.  VAGINA: No relaxation. No itching. No odor. No discharge. No lesions.  URINARY: No incontinence. No nocturia. No frequency. No dysuria.    /80   Ht 5' 6" (1.676 m)   Wt 75.5 kg (166 lb 7.2 oz)   LMP  (LMP Unknown)   BMI 26.87 kg/m²     PE:  APPEARANCE: Well nourished, well developed, in no acute distress.  AFFECT: WNL, alert and oriented x 3.  SKIN: No hirsutism or acne.  NECK: Neck symmetric without masses or thyromegaly.  NODES: No inguinal, cervical, axillary or femoral lymph node enlargement.  CHEST: Good respiratory effort.   ABDOMEN: Soft. No tenderness or masses. No hepatosplenomegaly. No hernias.  BREASTS: Symmetrical, no skin changes or visible lesions. No palpable masses, nipple discharge bilaterally.  PELVIC: ATROPHIC EXTERNAL FEMALE GENITALIA without lesions. Normal hair distribution. Adequate perineal body, normal urethral meatus. VAGINA DRY without lesions or discharge. CERVIX STENOTIC without lesions, discharge or tenderness. No significant cystocele or rectocele. Bimanual exam shows uterus to be normal size, regular, mobile and nontender. Adnexa without masses or tenderness.  EXTREMITIES: No edema.    PROCEDURES:    DIAGNOSIS:  1. Visit for gynecologic examination     2. " Menopausal and perimenopausal disorder     3. Stage 1 infiltrating ductal carcinoma of right female breast         PLAN:    LABS AND TESTS ORDERED:  Mammogram    COUNSELING:  The patient was counseled today on osteoporosis prevention, calcium supplementation, and regular weight bearing exercise. The patient was also counseled today on ACS PAP guidelines, with recommendations for yearly pelvic exams unless their uterus, cervix, and ovaries were removed for benign reasons; in that case, examinations every 3-5 years are recommended.  The patient was also counseled regarding monthly breast self-examination, routine STD screening for at-risk populations, prophylactic immunizations for transmitted infections such as  HPV, Pertussis, or Influenza as appropriate, and yearly mammograms when indicated by ACS guidelines.  She was advised to see her primary care physician for all other health maintenance.    FOLLOW-UP with me annually.

## 2019-10-03 ENCOUNTER — TELEPHONE (OUTPATIENT)
Dept: SURGERY | Facility: CLINIC | Age: 64
End: 2019-10-03

## 2019-10-03 NOTE — TELEPHONE ENCOUNTER
Spoke with pt, she stated she will call back when its closer to get her mammo done, no issues or reason to be seen at this time ----- Message from Cally Wolff RN sent at 10/2/2019  5:09 PM CDT -----  Contact: pt can be reached at ext  [phone]  Please call patient to schedule.  If Jared has nothing, advise patient that she is leaving and that patient can see Marjorie.  Thanks!    Cally    ----- Message -----  From: Briana Thapa MA  Sent: 10/2/2019   4:05 PM CDT  To: [facility] Staff    Pt has a recall that's due this month, I'm unable to access an appt on this providers schedule     Can you assist pt with an appt?     pt can be reached at ext  [phone]

## 2019-10-09 ENCOUNTER — TELEPHONE (OUTPATIENT)
Dept: INTERNAL MEDICINE | Facility: CLINIC | Age: 64
End: 2019-10-09

## 2019-10-09 NOTE — TELEPHONE ENCOUNTER
Davis can you please schedule appointment for pt to see Dr. Carrillo 10/22 @10am in the AREX slot. Its okay with Dr. Carrillo

## 2019-10-09 NOTE — TELEPHONE ENCOUNTER
----- Message from Tina Morales sent at 10/9/2019  8:29 AM CDT -----  Contact: self/[phone]  Caller is requesting a sooner appointment. Caller declined first available appointment listed below. Caller will not accept being placed on the wait list and is requesting a message be sent to the provider.    When is the next available appointment:  11/19  Did you offer to schedule the next available appt and put the patient on the wait list?:   Yes, no.  What visit type: epp  Symptoms:  Physical   Patient preference of timeframe to be scheduled:  Within 3-4 weeks  What is the reason the patient is requesting a sooner appointment? (insurance terminating, changing jobs):    Would the patient rather a call back or a response via [facility]?:    Comments: I offered an appointment with another college but patient refuse stating that is her Physical and would like to have it done with PCP. If patient is call today please call ext [phone] until 11 am. After 11 am contact her cell. Thank you

## 2019-10-10 RX ORDER — LORAZEPAM 1 MG/1
TABLET ORAL
Qty: 30 TABLET | Refills: 1 | Status: SHIPPED | OUTPATIENT
Start: 2019-10-10 | End: 2020-06-30

## 2019-10-16 ENCOUNTER — TELEPHONE (OUTPATIENT)
Dept: INTERNAL MEDICINE | Facility: CLINIC | Age: 64
End: 2019-10-16

## 2019-10-16 DIAGNOSIS — Z00.00 ANNUAL PHYSICAL EXAM: Primary | ICD-10-CM

## 2019-10-16 NOTE — TELEPHONE ENCOUNTER
----- Message from Cindy Rondon sent at 10/16/2019  8:56 AM CDT -----  Contact: Pt [phone]  Type: Orders Request    What orders/ testing are being requested? EPP    Is there a future appointment scheduled for the patient with PCP? Yes    When? 10/22/19

## 2019-10-22 ENCOUNTER — OFFICE VISIT (OUTPATIENT)
Dept: INTERNAL MEDICINE | Facility: CLINIC | Age: 64
End: 2019-10-22
Payer: COMMERCIAL

## 2019-10-22 VITALS
BODY MASS INDEX: 27.03 KG/M2 | HEART RATE: 71 BPM | OXYGEN SATURATION: 99 % | WEIGHT: 168.19 LBS | HEIGHT: 66 IN | DIASTOLIC BLOOD PRESSURE: 72 MMHG | SYSTOLIC BLOOD PRESSURE: 122 MMHG

## 2019-10-22 DIAGNOSIS — E78.5 HYPERLIPIDEMIA, UNSPECIFIED HYPERLIPIDEMIA TYPE: ICD-10-CM

## 2019-10-22 DIAGNOSIS — M54.2 NECK PAIN: ICD-10-CM

## 2019-10-22 DIAGNOSIS — Z00.00 ANNUAL PHYSICAL EXAM: Primary | ICD-10-CM

## 2019-10-22 DIAGNOSIS — E03.4 HYPOTHYROIDISM DUE TO ACQUIRED ATROPHY OF THYROID: ICD-10-CM

## 2019-10-22 DIAGNOSIS — I10 ESSENTIAL HYPERTENSION: ICD-10-CM

## 2019-10-22 DIAGNOSIS — R53.83 FATIGUE, UNSPECIFIED TYPE: ICD-10-CM

## 2019-10-22 DIAGNOSIS — F43.9 SITUATIONAL STRESS: ICD-10-CM

## 2019-10-22 DIAGNOSIS — R07.89 ATYPICAL CHEST PAIN: ICD-10-CM

## 2019-10-22 DIAGNOSIS — Z85.3 PERSONAL HISTORY OF BREAST CANCER: ICD-10-CM

## 2019-10-22 PROCEDURE — 99396 PR PREVENTIVE VISIT,EST,40-64: ICD-10-PCS | Mod: S$GLB,,, | Performed by: INTERNAL MEDICINE

## 2019-10-22 PROCEDURE — 3078F PR MOST RECENT DIASTOLIC BLOOD PRESSURE < 80 MM HG: ICD-10-PCS | Mod: CPTII,S$GLB,, | Performed by: INTERNAL MEDICINE

## 2019-10-22 PROCEDURE — 99396 PREV VISIT EST AGE 40-64: CPT | Mod: S$GLB,,, | Performed by: INTERNAL MEDICINE

## 2019-10-22 PROCEDURE — 3074F SYST BP LT 130 MM HG: CPT | Mod: CPTII,S$GLB,, | Performed by: INTERNAL MEDICINE

## 2019-10-22 PROCEDURE — 99999 PR PBB SHADOW E&M-EST. PATIENT-LVL III: ICD-10-PCS | Mod: PBBFAC,,, | Performed by: INTERNAL MEDICINE

## 2019-10-22 PROCEDURE — 3074F PR MOST RECENT SYSTOLIC BLOOD PRESSURE < 130 MM HG: ICD-10-PCS | Mod: CPTII,S$GLB,, | Performed by: INTERNAL MEDICINE

## 2019-10-22 PROCEDURE — 99999 PR PBB SHADOW E&M-EST. PATIENT-LVL III: CPT | Mod: PBBFAC,,, | Performed by: INTERNAL MEDICINE

## 2019-10-22 PROCEDURE — 3078F DIAST BP <80 MM HG: CPT | Mod: CPTII,S$GLB,, | Performed by: INTERNAL MEDICINE

## 2019-10-22 RX ORDER — CYCLOBENZAPRINE HCL 10 MG
TABLET ORAL
Qty: 30 TABLET | Refills: 1 | Status: SHIPPED | OUTPATIENT
Start: 2019-10-22 | End: 2021-10-27

## 2019-10-22 NOTE — PROGRESS NOTES
Subjective:       Patient ID: Chelsie Foster is a 64 y.o. female.    Chief Complaint: Annual Exam    HPI   Stress continues.  Mother is ill with lymphoma, living with sister, Yaneth, on [place].   Chronic stress related to caring for schizophrenic son.  Takes ativan rarely.      Interim dx MG on L was normal.    Also saw Dr Gutierrez -       C/o pain R trap beginning in April.        Constant, but intermittently more severe.      Tightness.  Also with pain post neck.           Occas tinnitus r ear.        C/o am fatigue.  No somnolence.  Unaware of snoring.  Low energy.       GERD is controlled mostly.  Awakens with reflux if she eats late at night.        Worried about heart.   palps in am.  She has occas cp which she relates to gerd.  Sharp discomfort, lasting minutes, better with belching.  Father had chf. She worries heart dis is causing fatigue.  NP in breast surg expressed concern about her risk of heart disease.      Review of Systems   Constitutional: Negative for fever and unexpected weight change.   HENT: Negative for congestion and postnasal drip.    Respiratory: Negative for chest tightness, shortness of breath and wheezing.    Cardiovascular: Negative for chest pain and leg swelling.   Gastrointestinal: Negative for abdominal pain, anal bleeding, constipation, diarrhea, nausea and vomiting.   Genitourinary: Negative for dysuria and urgency.   Skin: Negative for rash.   Neurological: Negative for headaches.   Psychiatric/Behavioral: Negative for dysphoric mood and sleep disturbance. The patient is not nervous/anxious.        Objective:      Physical Exam   Constitutional: She is oriented to person, place, and time. She appears well-developed and well-nourished. No distress.   HENT:   Head: Normocephalic and atraumatic.   Right Ear: External ear normal.   Left Ear: External ear normal.   Nose: Nose normal.   Mouth/Throat: Oropharynx is clear and moist.   Eyes: Pupils are equal, round, and reactive to  light. Conjunctivae and EOM are normal. Right eye exhibits no discharge. Left eye exhibits no discharge. No scleral icterus.   Neck: Normal range of motion. Neck supple. No JVD present. No thyromegaly present.   Cardiovascular: Normal rate, regular rhythm and normal heart sounds. Exam reveals no gallop.   No murmur heard.  Pulmonary/Chest: Effort normal and breath sounds normal. No respiratory distress. She has no wheezes. She has no rales.   Abdominal: Soft. Bowel sounds are normal. She exhibits no distension and no mass. There is no tenderness. There is no rebound and no guarding.   Musculoskeletal: Normal range of motion. She exhibits no edema or tenderness.        Right shoulder: Normal.   Lymphadenopathy:     She has no cervical adenopathy.   Neurological: She is alert and oriented to person, place, and time. No cranial nerve deficit. Coordination normal.   Skin: Skin is warm and dry. No rash noted.   Psychiatric: She has a normal mood and affect. Her behavior is normal. Judgment and thought content normal.       Assessment:       1. Annual physical exam    2. Hypothyroidism due to acquired atrophy of thyroid    3. Essential hypertension    4. Hyperlipidemia, unspecified hyperlipidemia type    5. Neck pain    6. Situational stress    7. Fatigue, unspecified type    8. Personal history of breast cancer    9. Atypical chest pain        Plan:       Chelsie was seen today for annual exam.    Diagnoses and all orders for this visit:    Annual physical exam    Hypothyroidism due to acquired atrophy of thyroid    Essential hypertension    Hyperlipidemia, unspecified hyperlipidemia type    Neck pain  -     X-Ray Cervical Spine AP And Lateral; Future    Situational stress    Fatigue, unspecified type  -     Exercise Stress - EKG; Future    Personal history of breast cancer    Atypical chest pain  -     Exercise Stress - EKG; Future    Other orders  -     cyclobenzaprine (FLEXERIL) 10 MG tablet; Half-1 tab nightly for  neck pain       Call EAP for counseling.

## 2019-10-23 ENCOUNTER — LAB VISIT (OUTPATIENT)
Dept: LAB | Facility: HOSPITAL | Age: 64
End: 2019-10-23
Attending: INTERNAL MEDICINE
Payer: COMMERCIAL

## 2019-10-23 DIAGNOSIS — Z00.00 ANNUAL PHYSICAL EXAM: ICD-10-CM

## 2019-10-23 LAB
ALBUMIN SERPL BCP-MCNC: 3.7 G/DL (ref 3.5–5.2)
ALP SERPL-CCNC: 98 U/L (ref 55–135)
ALT SERPL W/O P-5'-P-CCNC: 20 U/L (ref 10–44)
ANION GAP SERPL CALC-SCNC: 7 MMOL/L (ref 8–16)
AST SERPL-CCNC: 19 U/L (ref 10–40)
BASOPHILS # BLD AUTO: 0.06 K/UL (ref 0–0.2)
BASOPHILS NFR BLD: 0.9 % (ref 0–1.9)
BILIRUB SERPL-MCNC: 0.3 MG/DL (ref 0.1–1)
BUN SERPL-MCNC: 19 MG/DL (ref 8–23)
CALCIUM SERPL-MCNC: 9.9 MG/DL (ref 8.7–10.5)
CHLORIDE SERPL-SCNC: 101 MMOL/L (ref 95–110)
CHOLEST SERPL-MCNC: 208 MG/DL (ref 120–199)
CHOLEST/HDLC SERPL: 4.3 {RATIO} (ref 2–5)
CO2 SERPL-SCNC: 31 MMOL/L (ref 23–29)
CREAT SERPL-MCNC: 0.8 MG/DL (ref 0.5–1.4)
DIFFERENTIAL METHOD: ABNORMAL
EOSINOPHIL # BLD AUTO: 0.3 K/UL (ref 0–0.5)
EOSINOPHIL NFR BLD: 4.8 % (ref 0–8)
ERYTHROCYTE [DISTWIDTH] IN BLOOD BY AUTOMATED COUNT: 14.7 % (ref 11.5–14.5)
EST. GFR  (AFRICAN AMERICAN): >60 ML/MIN/1.73 M^2
EST. GFR  (NON AFRICAN AMERICAN): >60 ML/MIN/1.73 M^2
GLUCOSE SERPL-MCNC: 92 MG/DL (ref 70–110)
HCT VFR BLD AUTO: 41.2 % (ref 37–48.5)
HDLC SERPL-MCNC: 48 MG/DL (ref 40–75)
HDLC SERPL: 23.1 % (ref 20–50)
HGB BLD-MCNC: 12.9 G/DL (ref 12–16)
IMM GRANULOCYTES # BLD AUTO: 0.02 K/UL (ref 0–0.04)
IMM GRANULOCYTES NFR BLD AUTO: 0.3 % (ref 0–0.5)
LDLC SERPL CALC-MCNC: 127.4 MG/DL (ref 63–159)
LYMPHOCYTES # BLD AUTO: 2.1 K/UL (ref 1–4.8)
LYMPHOCYTES NFR BLD: 33.1 % (ref 18–48)
MCH RBC QN AUTO: 28 PG (ref 27–31)
MCHC RBC AUTO-ENTMCNC: 31.3 G/DL (ref 32–36)
MCV RBC AUTO: 90 FL (ref 82–98)
MONOCYTES # BLD AUTO: 0.6 K/UL (ref 0.3–1)
MONOCYTES NFR BLD: 9 % (ref 4–15)
NEUTROPHILS # BLD AUTO: 3.3 K/UL (ref 1.8–7.7)
NEUTROPHILS NFR BLD: 51.9 % (ref 38–73)
NONHDLC SERPL-MCNC: 160 MG/DL
NRBC BLD-RTO: 0 /100 WBC
PLATELET # BLD AUTO: 258 K/UL (ref 150–350)
PMV BLD AUTO: 9.8 FL (ref 9.2–12.9)
POTASSIUM SERPL-SCNC: 3.7 MMOL/L (ref 3.5–5.1)
PROT SERPL-MCNC: 7.7 G/DL (ref 6–8.4)
RBC # BLD AUTO: 4.6 M/UL (ref 4–5.4)
SODIUM SERPL-SCNC: 139 MMOL/L (ref 136–145)
TRIGL SERPL-MCNC: 163 MG/DL (ref 30–150)
TSH SERPL DL<=0.005 MIU/L-ACNC: 1.16 UIU/ML (ref 0.4–4)
WBC # BLD AUTO: 6.41 K/UL (ref 3.9–12.7)

## 2019-10-23 PROCEDURE — 80061 LIPID PANEL: CPT

## 2019-10-23 PROCEDURE — 80053 COMPREHEN METABOLIC PANEL: CPT

## 2019-10-23 PROCEDURE — 36415 COLL VENOUS BLD VENIPUNCTURE: CPT

## 2019-10-23 PROCEDURE — 85025 COMPLETE CBC W/AUTO DIFF WBC: CPT

## 2019-10-23 PROCEDURE — 84443 ASSAY THYROID STIM HORMONE: CPT

## 2019-10-24 ENCOUNTER — HOSPITAL ENCOUNTER (OUTPATIENT)
Dept: RADIOLOGY | Facility: HOSPITAL | Age: 64
Discharge: HOME | End: 2019-10-24
Attending: INTERNAL MEDICINE
Payer: COMMERCIAL

## 2019-10-24 DIAGNOSIS — M54.2 NECK PAIN: ICD-10-CM

## 2019-10-24 PROCEDURE — 72040 X-RAY EXAM NECK SPINE 2-3 VW: CPT | Mod: 26,,, | Performed by: RADIOLOGY

## 2019-10-24 PROCEDURE — 72040 XR CERVICAL SPINE AP LATERAL: ICD-10-PCS | Mod: 26,,, | Performed by: RADIOLOGY

## 2019-10-24 PROCEDURE — 72040 X-RAY EXAM NECK SPINE 2-3 VW: CPT | Mod: TC

## 2019-11-15 RX ORDER — LEVOTHYROXINE SODIUM 112 UG/1
TABLET ORAL
Qty: 90 TABLET | Refills: 3 | Status: SHIPPED | OUTPATIENT
Start: 2019-11-15 | End: 2020-10-28 | Stop reason: SDUPTHER

## 2019-12-27 RX ORDER — PRAVASTATIN SODIUM 40 MG/1
TABLET ORAL
Qty: 90 TABLET | Refills: 3 | Status: SHIPPED | OUTPATIENT
Start: 2019-12-27 | End: 2020-10-28 | Stop reason: SDUPTHER

## 2019-12-27 NOTE — PROGRESS NOTES
Refill Authorization Note     is requesting a refill authorization.    Brief assessment and rationale for refill: APPROVE: prr                                         Comments:   Requested Prescriptions   Pending Prescriptions Disp Refills    pravastatin (PRAVACHOL) 40 MG tablet 90 tablet 3     Sig: TAKE ONE TABLET BY MOUTH EVERY DAY       Cardiovascular:  Antilipid - Statins Passed - 12/27/2019  8:40 AM        Passed - Patient is at least 18 years old        Passed - Office visit in past 12 months or future 90 days     Recent Outpatient Visits            2 months ago Annual physical exam    [facility] - Internal Medicine Christi Carrillo MD    3 months ago Visit for gynecologic examination    [facility] - OB/GYN 5th Floor Angy Gutierrez MD    7 months ago Personal history of breast cancer    [facility] Breast Surgery Jared Moya DNP    9 months ago Lump of right breast    [facility] Surgery Jared Moya DNP    9 months ago Encounter for fitting or adjustment of spectacles or contact lenses    [facility] - Optometry Mat E Diana, OD                    Passed - Lipid Panel completed in last 360 days     Lab Results   Component Value Date    CHOL 208 (H) 10/23/2019    HDL 48 10/23/2019    LDLCALC 127.4 10/23/2019    TRIG 163 (H) 10/23/2019             Passed - ALT is 94 or below and within 360 days     ALT   Date Value Ref Range Status   10/23/2019 20 10 - 44 U/L Final   08/27/2018 26 10 - 44 U/L Final   08/21/2017 20 10 - 44 U/L Final              Passed - AST is 54 or below and within 360 days     AST   Date Value Ref Range Status   10/23/2019 19 10 - 40 U/L Final   08/27/2018 21 10 - 40 U/L Final   08/21/2017 19 10 - 40 U/L Final

## 2020-02-20 RX ORDER — PANTOPRAZOLE SODIUM 40 MG/1
TABLET, DELAYED RELEASE ORAL
Qty: 90 TABLET | Refills: 3 | Status: CANCELLED | OUTPATIENT
Start: 2020-02-20

## 2020-02-24 RX ORDER — PANTOPRAZOLE SODIUM 40 MG/1
TABLET, DELAYED RELEASE ORAL
Qty: 90 TABLET | Refills: 3 | Status: CANCELLED | OUTPATIENT
Start: 2020-02-20

## 2020-02-27 ENCOUNTER — PATIENT MESSAGE (OUTPATIENT)
Dept: INTERNAL MEDICINE | Facility: CLINIC | Age: 65
End: 2020-02-27

## 2020-02-27 RX ORDER — PANTOPRAZOLE SODIUM 40 MG/1
TABLET, DELAYED RELEASE ORAL
Qty: 90 TABLET | Refills: 0 | Status: SHIPPED | OUTPATIENT
Start: 2020-02-27 | End: 2020-07-08 | Stop reason: SDUPTHER

## 2020-02-27 NOTE — TELEPHONE ENCOUNTER
Refill Authorization Note     is requesting a refill authorization.    Brief assessment and rationale for refill: APPROVE: prr                                         Comments:   Requested Prescriptions   Signed Prescriptions Disp Refills    pantoprazole (PROTONIX) 40 MG tablet 90 tablet 0     Sig: TAKE 1 TABLET BY MOUTH EVERY DAY       Gastroenterology: Proton Pump Inhibitors 2 Passed - 2/27/2020  3:16 PM        Passed - Patient is at least 18 years old        Passed - Osteoporosis is not on problem list        Passed - An appropriate indication is on the problem list        Passed - Office visit in past 6 months or future 90 days.     Recent Outpatient Visits            4 months ago Annual physical exam    [facility] Internal Medicine Christi Carrillo MD    5 months ago Visit for gynecologic examination    [facility] - OB/GYN 5th Floor Angy Gutierrez MD    10 months ago Personal history of breast cancer    [facility] Breast Surgery Jared Moya DNP    11 months ago [facility] right breast    [facility]    11 months ago Encounter for fitting or adjustment of spectacles or contact lenses    [facility] Optometry Mat Ortiz OD                     Appointments  past 12m or future 3m with PCP    Date Provider   Last Visit   10/22/2019 Christi Carrillo MD   Next Visit   Visit date not found Christi Carirllo MD   .  ED visits in past 90 days: [unfilled]       Note composed:3:27 PM 02/27/2020

## 2020-04-21 DIAGNOSIS — Z01.84 ANTIBODY RESPONSE EXAMINATION: ICD-10-CM

## 2020-04-28 ENCOUNTER — LAB VISIT (OUTPATIENT)
Dept: LAB | Facility: HOSPITAL | Age: 65
End: 2020-04-28
Attending: INTERNAL MEDICINE
Payer: COMMERCIAL

## 2020-04-28 ENCOUNTER — TELEPHONE (OUTPATIENT)
Dept: INTERNAL MEDICINE | Facility: CLINIC | Age: 65
End: 2020-04-28

## 2020-04-28 DIAGNOSIS — Z85.3 PERSONAL HISTORY OF BREAST CANCER: Primary | ICD-10-CM

## 2020-04-28 DIAGNOSIS — Z01.84 ANTIBODY RESPONSE EXAMINATION: ICD-10-CM

## 2020-04-28 LAB — SARS-COV-2 IGG SERPL QL IA: NEGATIVE

## 2020-04-28 PROCEDURE — 36415 COLL VENOUS BLD VENIPUNCTURE: CPT

## 2020-04-28 PROCEDURE — 86769 SARS-COV-2 COVID-19 ANTIBODY: CPT

## 2020-04-28 NOTE — TELEPHONE ENCOUNTER
Spoke with patient , patient will call back to schedule mammogram due to not having her work schedule

## 2020-04-28 NOTE — TELEPHONE ENCOUNTER
----- Message from Elsie Henson sent at 4/28/2020  9:41 AM CDT -----  Contact: Pt self  work # [phone] Mobile/Home [phone]   Caller is requesting to schedule their annual screening mammogram appointment. Order is not listed in Epic.  Please enter order and contact patient to schedule.  Where would they like the mammogram performed?:  At the [facility] location  Would the patient rather receive a phone call back or a response via [facility]?:   A phone call please.

## 2020-05-14 ENCOUNTER — HOSPITAL ENCOUNTER (OUTPATIENT)
Dept: RADIOLOGY | Facility: HOSPITAL | Age: 65
Discharge: HOME | End: 2020-05-14
Attending: INTERNAL MEDICINE
Payer: COMMERCIAL

## 2020-05-14 DIAGNOSIS — Z85.3 PERSONAL HISTORY OF BREAST CANCER: ICD-10-CM

## 2020-05-14 PROCEDURE — 77062 BREAST TOMOSYNTHESIS BI: CPT | Mod: TC,PO

## 2020-05-14 PROCEDURE — G0279 MAMMO DIGITAL DIAGNOSTIC BILAT WITH TOMOSYNTHESIS_CAD: ICD-10-PCS | Mod: 26,,, | Performed by: RADIOLOGY

## 2020-05-14 PROCEDURE — G0279 TOMOSYNTHESIS, MAMMO: HCPCS | Mod: 26,,, | Performed by: RADIOLOGY

## 2020-05-14 PROCEDURE — 77066 MAMMO DIGITAL DIAGNOSTIC BILAT WITH TOMOSYNTHESIS_CAD: ICD-10-PCS | Mod: 26,,, | Performed by: RADIOLOGY

## 2020-05-14 PROCEDURE — 77066 DX MAMMO INCL CAD BI: CPT | Mod: 26,,, | Performed by: RADIOLOGY

## 2020-06-30 RX ORDER — LORAZEPAM 1 MG/1
TABLET ORAL
Qty: 30 TABLET | Refills: 5 | Status: SHIPPED | OUTPATIENT
Start: 2020-06-30 | End: 2021-02-23

## 2020-06-30 NOTE — PROGRESS NOTES
Refill Routing Note   Medication(s) are not appropriate for processing by [facility]:       - Outside of protocol           Medication reconciliation completed: No      Automatic Epic Generated Protocol Data:    Requested Prescriptions   Pending Prescriptions Disp Refills    LORazepam (ATIVAN) 1 MG tablet [Pharmacy Med Name: LORazepam 1 MG Oral Tablet] 30 tablet 0     Sig: TAKE 1/2 TO 1 (ONE-HALF TO ONE) TABLET BY MOUTH IN THE EVENING       Anticonvulsants Protocol Passed - 6/30/2020  8:49 AM        Passed - Visit with Authorizing provider in past 9 months or upcoming 90 days        Passed - No active pregnancy on record              Appointments  past 12m or future 3m with PCP    Date Provider   Last Visit   10/22/2019 Christi Carrillo MD   Next Visit   Visit date not found Christi Carrillo MD   ED visits in past 90 days: 0     Note composed:1:24 PM 06/30/2020

## 2020-07-08 RX ORDER — PANTOPRAZOLE SODIUM 40 MG/1
TABLET, DELAYED RELEASE ORAL
Qty: 90 TABLET | Refills: 0 | Status: SHIPPED | OUTPATIENT
Start: 2020-07-08 | End: 2020-10-28 | Stop reason: SDUPTHER

## 2020-07-08 NOTE — PROGRESS NOTES
Refill Authorization Note    is requesting a refill authorization.    Brief assessment and rationale for refill: APPROVE: NA NON-INTENTIONAL     Medication-related problems identified: Non-adherence (knowledge deficit) non-intentional    Medication Therapy Plan: PER EPIC DATA 50% ADHERENCE; APPROVE PER PROTOCOL    Medication reconciliation completed: No                         Comments:      Requested Prescriptions   Pending Prescriptions Disp Refills    pantoprazole (PROTONIX) 40 MG tablet 90 tablet 0     Sig: TAKE 1 TABLET BY MOUTH EVERY DAY       Gastroenterology: Proton Pump Inhibitors 2 Failed - 7/8/2020 10:23 AM        Failed - Office visit in past 6 months or future 90 days.     Recent Outpatient Visits            8 months ago [facility] exam    [facility]    9 months ago Visit for gynecologic examination    [facility] 5th Floor Angy Gutierrez MD    1 year ago [facility] history of breast cancer    [facility]    1 year ago [facility]ump [facility] right breast    [facility]    1 year ago Encounter for fitting or adjustment of spectacles or contact lenses    [facility] Optometry Matluana Ortiz, OD                    Passed - Patient is at least 18 years old        Passed - Osteoporosis is not on problem list        Passed - An appropriate indication is on the problem list            Appointments  past 12m or future 3m with PCP    Date Provider   Last Visit   10/22/2019 Christi Carrillo MD   Next Visit   Visit date not found Christi Carrillo MD   ED visits in past 90 days: 0     Note composed:1:14 PM 07/08/2020

## 2020-07-22 RX ORDER — TRIAMTERENE AND HYDROCHLOROTHIAZIDE 37.5; 25 MG/1; MG/1
CAPSULE ORAL
Qty: 90 CAPSULE | Refills: 3 | Status: SHIPPED | OUTPATIENT
Start: 2020-07-22 | End: 2020-10-28 | Stop reason: SDUPTHER

## 2020-07-22 NOTE — TELEPHONE ENCOUNTER
Care Due:                  Date            Visit Type   Department     Provider  --------------------------------------------------------------------------------                                PHYSICAL -                              ESTABLISHED   [facility] INTER[facility]  Last Visit: 10-      PATIENT      MEDICINE       TRINI ELLIS  Next Visit: None Scheduled  None         None Found                                                            Last  Test          Frequency    Reason                     Performed    Due Date  --------------------------------------------------------------------------------    Office Visit  12 months..  pravastatin,               10-   10-                             triamterene-hydrochloroth                             iazide...................    ALT.........  12 months..  pravastatin..............  10-   10-    AST.........  12 months..  pravastatin..............  10-   10-    Cr..........  6 months...  triamterene-hydrochloroth  10-   04-                             iazide...................    HDL.........  12 months..  pravastatin..............  10-   10-    K...........  6 months...  triamterene-hydrochloroth  10-   04-                             iazide...................    LDL.........  12 months..  pravastatin..............  10-   10-    Na..........  6 months...  triamterene-hydrochloroth  10-   04-                             iazide...................    Total         12 months..  pravastatin..............  10-   10-  Cholesterol.    Triglyceride  12 months..  pravastatin..............  10-   10-  s...........    Powered by "[employer]. Reference number: [id number]. 7/22/2020 8:14:40 AM CDT

## 2020-09-14 NOTE — TELEPHONE ENCOUNTER
No new care gaps identified.  Powered by [employer]. Reference number: [id number]. 9/14/2020 8:36:39 AM FABIANT

## 2020-09-15 RX ORDER — ATENOLOL 25 MG/1
TABLET ORAL
Qty: 90 TABLET | Refills: 0 | Status: SHIPPED | OUTPATIENT
Start: 2020-09-15 | End: 2020-10-28 | Stop reason: SDUPTHER

## 2020-09-15 NOTE — TELEPHONE ENCOUNTER
Refill Authorization Note    is requesting a refill authorization.    Brief assessment and rationale for refill: APPROVE: prr          Medication Therapy Plan: [facility]    Medication reconciliation completed: No                    Comments:      Orders Placed This Encounter    atenoloL (TENORMIN) 25 MG tablet      Requested Prescriptions   Signed Prescriptions Disp Refills    atenoloL (TENORMIN) 25 MG tablet 90 tablet 0     Sig: Take 1 tablet by mouth once daily       Cardiovascular:  Beta Blockers Passed - 9/14/2020  8:36 AM        Passed - Patient is at least 18 years old        Passed - Last BP in normal range within 360 days.     BP Readings from Last 3 Encounters:   10/22/19 122/72   09/17/19 122/80   05/02/19 122/74              Passed - Last Heart Rate in normal range within 360 days.     Pulse Readings from Last 3 Encounters:   10/22/19 71   05/02/19 71   09/26/18 69             Passed - Office visit in past 12 months or future 90 days.     Recent Outpatient Visits            10 months ago Annual physical exam    [place] [facility] Christi Carrillo MD    12 months ago Visit for gynecologic examination    [place] - [facility] Angy Gutierrez MD    1 year ago Personal history of breast cancer    [facility] Jared Moya DNP    1 year ago Lump of right breast    [facility]    1 year ago Encounter for fitting or adjustment of spectacles or contact lenses    [place] - Optometry Mat Ortiz, OD          Future Appointments              In 2 weeks MD Jake [facility] 5th Fl, [place]                    Appointments  past 12m or future 3m with PCP    Date Provider   Last Visit   10/22/2019 Christi Carrillo MD   Next Visit   Visit date not found Christi Carrillo MD   ED visits in past 90 days: [unfilled]     Note composed:6:03 PM 09/15/2020

## 2020-09-29 ENCOUNTER — PATIENT MESSAGE (OUTPATIENT)
Dept: OTHER | Facility: OTHER | Age: 65
End: 2020-09-29

## 2020-09-29 ENCOUNTER — OFFICE VISIT (OUTPATIENT)
Dept: OBSTETRICS AND GYNECOLOGY | Facility: CLINIC | Age: 65
End: 2020-09-29
Payer: COMMERCIAL

## 2020-09-29 VITALS — HEIGHT: 66 IN | BODY MASS INDEX: 26.93 KG/M2 | WEIGHT: 167.56 LBS

## 2020-09-29 DIAGNOSIS — N95.9 MENOPAUSAL AND PERIMENOPAUSAL DISORDER: ICD-10-CM

## 2020-09-29 DIAGNOSIS — Z01.419 VISIT FOR GYNECOLOGIC EXAMINATION: Primary | ICD-10-CM

## 2020-09-29 PROCEDURE — 3008F BODY MASS INDEX DOCD: CPT | Mod: CPTII,S$GLB,, | Performed by: OBSTETRICS & GYNECOLOGY

## 2020-09-29 PROCEDURE — 99999 PR PBB SHADOW E&M-EST. PATIENT-LVL III: ICD-10-PCS | Mod: PBBFAC,,, | Performed by: OBSTETRICS & GYNECOLOGY

## 2020-09-29 PROCEDURE — 99396 PREV VISIT EST AGE 40-64: CPT | Mod: S$GLB,,, | Performed by: OBSTETRICS & GYNECOLOGY

## 2020-09-29 PROCEDURE — 3008F PR BODY MASS INDEX (BMI) DOCUMENTED: ICD-10-PCS | Mod: CPTII,S$GLB,, | Performed by: OBSTETRICS & GYNECOLOGY

## 2020-09-29 PROCEDURE — 99999 PR PBB SHADOW E&M-EST. PATIENT-LVL III: CPT | Mod: PBBFAC,,, | Performed by: OBSTETRICS & GYNECOLOGY

## 2020-09-29 PROCEDURE — 99396 PR PREVENTIVE VISIT,EST,40-64: ICD-10-PCS | Mod: S$GLB,,, | Performed by: OBSTETRICS & GYNECOLOGY

## 2020-09-29 NOTE — PROGRESS NOTES
HISTORY OF PRESENT ILLNESS:    Chelsie Foster is a 64 y.o. female , presents for a routine exam and has no complaints.  MAMMO UP TO DATE, DUE NEXT 2021.  COTEST ALSO PLANNED .  NO GYN C/O.  PLANNING RETIRING NEXT December AND MOVING TO [place] AT SISTER'S FARM . . CARMEN SEES HER HORSES    LAST :  COTESTING DUE , BREAST SURGERY FOLLOWS MAMMO AND RECENT NL.  NO GYN C/O.  SISTER HAS MOVED WITH HORSES TO [place] AND SON WITH MENSTAL ILLNESS STILL LIVES WITH CHELSIE  LAST VISIT :  PAP SUBMITTED AND NEEDS MAMMO SCHEDULED.  SCARRING RIGHT UPPER INNER BREAST STABLE S/P RT.  NO GYN SX; STILL HAS OCCAS PELVIC PAIN BUT REASSURED USG LAST YEAR NEG AND VERY UNLIKELY DUE TO GYN CAUSE.  MILD VIRUS A WEEK AGO  LAST VISIT :   PAP DUE  AND BREAST TEAM F/U BREAST MONITORING - DX MAMMO DUE ON Monday.  OCCAS RLQ AND LLQ SHARP PAINS, ?? GI, GAS SOURCE BUT USG TO MONITOR.  NEW RELATIONSHIP, DISCUSSED HYPOALLERGENIC LUBRICANTS PRN  LAST WEEKEND CAMPING WITH SISTER AND MOTHER  LAST VISIT 3/2016:  4/15 TREATMENT FOR BREAST CA. PAP DUE  AND MAMMO HAS BEEN ORDERED FOR F/U BREAST CA LAST YEAR. OCCASIONAL RLQ AND LLQ PAINS, BRIEF AND NOT OVERALL WORSENING. ASSOCIATED WITH GAS AND REASSURED PELVIC EXAM WITHOUT MASSES; IF PERSISTS MAY MERIT A PELVIC USG BUT PLANS COLONOSCOPY SOON, SO WILL BE CONSCIENTIOUS ABOUT MEASURES TO MAINTAIN NORMAL BOWEL FUNCTION  LAST VISIT 2015:  Pap DUE AND REPEATED. MAMMO DUE 2015 AND ORDERS PLACED. HAS NO GYN C/O AND NOTES SOME DIARRHEA AND ABD PAIN WITH STRESS - REASSURED NO GYN ISSUES ON EXAM.   Last visit 10/2012:  HOT FLUSHES RARELY, WORSE WITH STRESS. STILL MUCH CAFFEINE INTAKE. MAMMO DUE MARCH AND HAD BMD LAST YEAR. PAP DONE AND DISCUSSED RECOMMENDATIONS FOR 3YR SCREENING  WORKS MEDICAL RECORDS - DISABILITY FORMS    Past Medical History:   Diagnosis Date    Anxiety     Breast cancer 2015    Right breast infiltrating ductal CA, ER/DE/Her2 negative     Depression     Genetic testing     see clinic notes -15 for details, VUS in [facility]    GERD (gastroesophageal reflux disease)     Hyperlipidemia     Hypertension     Hypothyroidism     PONV (postoperative nausea and vomiting)     Thyroid disease     hypothryoidism       Past Surgical History:   Procedure Laterality Date    APPENDECTOMY      BREAST BIOPSY Right 2015    [facility]     SECTION      COLONOSCOPY N/A 2016    Procedure: COLONOSCOPY;  Surgeon: YULIYA Sherman MD;  Location: [address]);  Service: Endoscopy;  Laterality: N/A;        MEDICATIONS AND ALLERGIES:      Current Outpatient Medications:     atenoloL (TENORMIN) 25 MG tablet, Take 1 tablet by mouth once daily, Disp: 90 tablet, Rfl: 0    cyclobenzaprine (FLEXERIL) 10 MG tablet, Take 1/2 to 1 tab nightly for neck pain, Disp: 30 tablet, Rfl: 1    levothyroxine (SYNTHROID) 112 MCG tablet, TAKE 1 TABLET BY MOUTH ONCE DAILY, Disp: 90 tablet, Rfl: 3    LORazepam (ATIVAN) 1 MG tablet, TAKE 1/2 TO 1 (ONE-HALF TO ONE) TABLET BY MOUTH IN THE EVENING, Disp: 30 tablet, Rfl: 5    pantoprazole (PROTONIX) 40 MG tablet, TAKE 1 TABLET BY MOUTH EVERY DAY, Disp: 90 tablet, Rfl: 0    pravastatin (PRAVACHOL) 40 MG tablet, TAKE ONE TABLET BY MOUTH EVERY DAY, Disp: 90 tablet, Rfl: 3    triamterene-hydrochlorothiazide 37.5-25 mg (DYAZIDE) 37.5-25 mg per capsule, TAKE 1 CAPSULE BY MOUTH EVERY DAY., Disp: 90 capsule, Rfl: 3    Review of patient's allergies indicates:   Allergen Reactions    No known drug allergies        Family History   Problem Relation Age of Onset    Hypertension Mother     Hyperlipidemia Mother     Breast cancer Mother 60    Cancer Mother         Basal cell skin CA, lymphoma    Depression Mother     Colon cancer Father     Dementia Father     Heart disease Father         chf    Cancer Father         colon CA and Basal Cell skin CA    Breast cancer Maternal Aunt 50    Cancer Maternal Aunt         Colon CA     Breast cancer Cousin 49        maternal    Hypertension Sister     Diabetes Sister     Hypertension Sister     Bipolar disorder Son     Mental illness Son         bipolar/schizoaffective    Diabetes Son     Cancer Maternal Cousin     Amblyopia Neg Hx     Blindness Neg Hx     Cataracts Neg Hx     Glaucoma Neg Hx     Macular degeneration Neg Hx     Retinal detachment Neg Hx     Strabismus Neg Hx     Stroke Neg Hx     Thyroid disease Neg Hx     Ovarian cancer Neg Hx        Social History     Socioeconomic History    Marital status:      Spouse name: Not on file    Number of children: 1    Years of education: Not on file    Highest education level: Not on file   Occupational History    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     Employer: [employer]   Social Needs    Financial resource strain: Not on file    Food insecurity     Worry: Not on file     Inability: Not on file    Transportation needs     Medical: Not on file     Non-medical: Not on file   Tobacco Use    Smoking status: Never Smoker    Smokeless tobacco: Never Used   Substance and Sexual Activity    Alcohol use: Yes     Comment: rarely    Drug use: No    Sexual activity: Yes     Partners: Male     Birth control/protection: Post-menopausal   Lifestyle    Physical activity     Days per week: Not on file     Minutes per session: Not on file    Stress: Not on file   Relationships    Social connections     Talks on phone: Not on file     Gets together: Not on file     Attends Shinto service: Not on file     Active member of club or organization: Not on file     Attends meetings of clubs or organizations: Not on file     Relationship status: Not on file   Other Topics Concern    Not on file   Social History Narrative    Walks a lot at work:  9-10 000 step s most days.       COMPREHENSIVE GYN HISTORY:  PAP History:  Denies abnormal Paps except a noted above.  Infection History: Denies STDs. Denies PID.  Benign  "History: Denies uterine fibroids. Denies ovarian cysts. Denies endometriosis.  Denies other conditions.  Cancer History: Denies cervical cancer. Denies uterine cancer or hyperplasia. Denies ovarian cancer. Denies vulvar cancer or pre-cancer. Denies vaginal cancer or pre-cancer. Denies breast cancer. Denies colon cancer.    ROS:  GENERAL: No weight changes. No swelling. No fatigue. No fever.  CARDIOVASCULAR: No chest pain. No shortness of breath. No leg cramps.   NEUROLOGICAL: No headaches. No vision changes.  BREASTS: No pain. No lumps. No discharge.  ABDOMEN: No pain. No nausea. No vomiting. No diarrhea. No constipation.  REPRODUCTIVE: No abnormal bleeding.   VULVA: No pain. No lesions. No itching.  VAGINA: No relaxation. No itching. No odor. No discharge. No lesions.  URINARY: No incontinence. No nocturia. No frequency. No dysuria.    Ht 5' 6" (1.676 m)   Wt 76 kg (167 lb 8.8 oz)   LMP  (LMP Unknown)   BMI 27.04 kg/m²     PE:  APPEARANCE: Well nourished, well developed, in no acute distress.  AFFECT: WNL, alert and oriented x 3.  SKIN: No hirsutism or acne.  NECK: Neck symmetric without masses or thyromegaly.  NODES: No inguinal, cervical, axillary or femoral lymph node enlargement.  CHEST: Good respiratory effort.   ABDOMEN: Soft. No tenderness or masses. No hepatosplenomegaly. No hernias.  BREASTS: Symmetrical, no skin changes or visible lesions. No palpable masses, nipple discharge bilaterally.  PELVIC: ATROPHIC EXTERNAL FEMALE GENITALIA without lesions. Normal hair distribution. Adequate perineal body, normal urethral meatus. VAGINA DRY without lesions or discharge. CERVIX STENOTIC without lesions, discharge or tenderness. No significant cystocele or rectocele. Bimanual exam shows uterus to be normal size, regular, mobile and nontender. Adnexa without masses or tenderness.  EXTREMITIES: No edema.    PROCEDURES:      DIAGNOSIS:  1. Visit for gynecologic examination     2. Menopausal and perimenopausal " disorder         COUNSELING:  The patient was counseled today on osteoporosis prevention, calcium supplementation, and regular weight bearing exercise. The patient was also counseled today on ACS PAP guidelines, with recommendations for yearly pelvic exams unless their uterus, cervix, and ovaries were removed for benign reasons; in that case, examinations every 3-5 years are recommended.  The patient was also counseled regarding monthly breast self-examination, routine STD screening for at-risk populations, prophylactic immunizations for transmitted infections such as  HPV, Pertussis, or Influenza as appropriate, and yearly mammograms when indicated by ACS guidelines.  She was advised to see her primary care physician for all other health maintenance.    FOLLOW-UP with me annually.

## 2020-10-06 ENCOUNTER — TELEPHONE (OUTPATIENT)
Dept: INTERNAL MEDICINE | Facility: CLINIC | Age: 65
End: 2020-10-06

## 2020-10-06 DIAGNOSIS — Z00.00 ANNUAL PHYSICAL EXAM: Primary | ICD-10-CM

## 2020-10-06 NOTE — TELEPHONE ENCOUNTER
----- Message from Guerda Aggarwal sent at 10/6/2020  2:49 PM CDT -----  Contact: self [phone]    Doctor appointment and lab have been scheduled.  Please link lab orders to the lab appointment.  Date of doctor appointment:  10/28  Date of lab appointment:  10/22  Physical or EP: physical  Comments:

## 2020-10-22 ENCOUNTER — LAB VISIT (OUTPATIENT)
Dept: LAB | Facility: HOSPITAL | Age: 65
End: 2020-10-22
Payer: COMMERCIAL

## 2020-10-22 DIAGNOSIS — Z00.00 ANNUAL PHYSICAL EXAM: ICD-10-CM

## 2020-10-22 LAB
ALBUMIN SERPL BCP-MCNC: 3.5 G/DL (ref 3.5–5.2)
ALP SERPL-CCNC: 95 U/L (ref 55–135)
ALT SERPL W/O P-5'-P-CCNC: 18 U/L (ref 10–44)
ANION GAP SERPL CALC-SCNC: 7 MMOL/L (ref 8–16)
AST SERPL-CCNC: 23 U/L (ref 10–40)
BASOPHILS # BLD AUTO: 0.05 K/UL (ref 0–0.2)
BASOPHILS NFR BLD: 0.7 % (ref 0–1.9)
BILIRUB SERPL-MCNC: 0.3 MG/DL (ref 0.1–1)
BUN SERPL-MCNC: 18 MG/DL (ref 8–23)
CALCIUM SERPL-MCNC: 9.6 MG/DL (ref 8.7–10.5)
CHLORIDE SERPL-SCNC: 101 MMOL/L (ref 95–110)
CHOLEST SERPL-MCNC: 192 MG/DL (ref 120–199)
CHOLEST/HDLC SERPL: 4.3 {RATIO} (ref 2–5)
CO2 SERPL-SCNC: 32 MMOL/L (ref 23–29)
CREAT SERPL-MCNC: 0.8 MG/DL (ref 0.5–1.4)
DIFFERENTIAL METHOD: ABNORMAL
EOSINOPHIL # BLD AUTO: 0.3 K/UL (ref 0–0.5)
EOSINOPHIL NFR BLD: 4.3 % (ref 0–8)
ERYTHROCYTE [DISTWIDTH] IN BLOOD BY AUTOMATED COUNT: 14.8 % (ref 11.5–14.5)
EST. GFR  (AFRICAN AMERICAN): >60 ML/MIN/1.73 M^2
EST. GFR  (NON AFRICAN AMERICAN): >60 ML/MIN/1.73 M^2
GLUCOSE SERPL-MCNC: 102 MG/DL (ref 70–110)
HCT VFR BLD AUTO: 40 % (ref 37–48.5)
HDLC SERPL-MCNC: 45 MG/DL (ref 40–75)
HDLC SERPL: 23.4 % (ref 20–50)
HGB BLD-MCNC: 12.6 G/DL (ref 12–16)
IMM GRANULOCYTES # BLD AUTO: 0.02 K/UL (ref 0–0.04)
IMM GRANULOCYTES NFR BLD AUTO: 0.3 % (ref 0–0.5)
LDLC SERPL CALC-MCNC: 108 MG/DL (ref 63–159)
LYMPHOCYTES # BLD AUTO: 2 K/UL (ref 1–4.8)
LYMPHOCYTES NFR BLD: 28.9 % (ref 18–48)
MCH RBC QN AUTO: 28.1 PG (ref 27–31)
MCHC RBC AUTO-ENTMCNC: 31.5 G/DL (ref 32–36)
MCV RBC AUTO: 89 FL (ref 82–98)
MONOCYTES # BLD AUTO: 0.7 K/UL (ref 0.3–1)
MONOCYTES NFR BLD: 9.8 % (ref 4–15)
NEUTROPHILS # BLD AUTO: 3.8 K/UL (ref 1.8–7.7)
NEUTROPHILS NFR BLD: 56 % (ref 38–73)
NONHDLC SERPL-MCNC: 147 MG/DL
NRBC BLD-RTO: 0 /100 WBC
PLATELET # BLD AUTO: 274 K/UL (ref 150–350)
PMV BLD AUTO: 10.2 FL (ref 9.2–12.9)
POTASSIUM SERPL-SCNC: 3.9 MMOL/L (ref 3.5–5.1)
PROT SERPL-MCNC: 7.6 G/DL (ref 6–8.4)
RBC # BLD AUTO: 4.49 M/UL (ref 4–5.4)
SODIUM SERPL-SCNC: 140 MMOL/L (ref 136–145)
TRIGL SERPL-MCNC: 195 MG/DL (ref 30–150)
TSH SERPL DL<=0.005 MIU/L-ACNC: 1.19 UIU/ML (ref 0.4–4)
WBC # BLD AUTO: 6.81 K/UL (ref 3.9–12.7)

## 2020-10-22 PROCEDURE — 36415 COLL VENOUS BLD VENIPUNCTURE: CPT

## 2020-10-22 PROCEDURE — 80053 COMPREHEN METABOLIC PANEL: CPT

## 2020-10-22 PROCEDURE — 80061 LIPID PANEL: CPT

## 2020-10-22 PROCEDURE — 84443 ASSAY THYROID STIM HORMONE: CPT

## 2020-10-22 PROCEDURE — 85025 COMPLETE CBC W/AUTO DIFF WBC: CPT

## 2020-10-28 ENCOUNTER — OFFICE VISIT (OUTPATIENT)
Dept: INTERNAL MEDICINE | Facility: CLINIC | Age: 65
End: 2020-10-28
Payer: COMMERCIAL

## 2020-10-28 VITALS
BODY MASS INDEX: 27.7 KG/M2 | DIASTOLIC BLOOD PRESSURE: 80 MMHG | SYSTOLIC BLOOD PRESSURE: 134 MMHG | OXYGEN SATURATION: 96 % | HEART RATE: 73 BPM | WEIGHT: 172.38 LBS | HEIGHT: 66 IN

## 2020-10-28 DIAGNOSIS — F41.9 ANXIETY: ICD-10-CM

## 2020-10-28 DIAGNOSIS — E03.4 HYPOTHYROIDISM DUE TO ACQUIRED ATROPHY OF THYROID: ICD-10-CM

## 2020-10-28 DIAGNOSIS — N95.9 MENOPAUSAL DISORDER: ICD-10-CM

## 2020-10-28 DIAGNOSIS — Z85.3 PERSONAL HISTORY OF BREAST CANCER: ICD-10-CM

## 2020-10-28 DIAGNOSIS — E78.1 HYPERTRIGLYCERIDEMIA: ICD-10-CM

## 2020-10-28 DIAGNOSIS — I10 ESSENTIAL HYPERTENSION: ICD-10-CM

## 2020-10-28 DIAGNOSIS — E78.5 HYPERLIPIDEMIA, UNSPECIFIED HYPERLIPIDEMIA TYPE: ICD-10-CM

## 2020-10-28 DIAGNOSIS — R07.89 ATYPICAL CHEST PAIN: ICD-10-CM

## 2020-10-28 DIAGNOSIS — K21.9 GASTROESOPHAGEAL REFLUX DISEASE WITHOUT ESOPHAGITIS: ICD-10-CM

## 2020-10-28 DIAGNOSIS — F43.9 SITUATIONAL STRESS: ICD-10-CM

## 2020-10-28 DIAGNOSIS — Z00.00 ANNUAL PHYSICAL EXAM: Primary | ICD-10-CM

## 2020-10-28 PROCEDURE — 99397 PER PM REEVAL EST PAT 65+ YR: CPT | Mod: S$GLB,,, | Performed by: INTERNAL MEDICINE

## 2020-10-28 PROCEDURE — 3075F SYST BP GE 130 - 139MM HG: CPT | Mod: CPTII,S$GLB,, | Performed by: INTERNAL MEDICINE

## 2020-10-28 PROCEDURE — 99999 PR PBB SHADOW E&M-EST. PATIENT-LVL III: CPT | Mod: PBBFAC,,, | Performed by: INTERNAL MEDICINE

## 2020-10-28 PROCEDURE — 99397 PR PREVENTIVE VISIT,EST,65 & OVER: ICD-10-PCS | Mod: S$GLB,,, | Performed by: INTERNAL MEDICINE

## 2020-10-28 PROCEDURE — 3075F PR MOST RECENT SYSTOLIC BLOOD PRESS GE 130-139MM HG: ICD-10-PCS | Mod: CPTII,S$GLB,, | Performed by: INTERNAL MEDICINE

## 2020-10-28 PROCEDURE — 3079F PR MOST RECENT DIASTOLIC BLOOD PRESSURE 80-89 MM HG: ICD-10-PCS | Mod: CPTII,S$GLB,, | Performed by: INTERNAL MEDICINE

## 2020-10-28 PROCEDURE — 99999 PR PBB SHADOW E&M-EST. PATIENT-LVL III: ICD-10-PCS | Mod: PBBFAC,,, | Performed by: INTERNAL MEDICINE

## 2020-10-28 PROCEDURE — 3079F DIAST BP 80-89 MM HG: CPT | Mod: CPTII,S$GLB,, | Performed by: INTERNAL MEDICINE

## 2020-10-28 RX ORDER — PRAVASTATIN SODIUM 40 MG/1
TABLET ORAL
Qty: 90 TABLET | Refills: 3 | Status: SHIPPED | OUTPATIENT
Start: 2020-10-28 | End: 2021-10-27 | Stop reason: SDUPTHER

## 2020-10-28 RX ORDER — PANTOPRAZOLE SODIUM 40 MG/1
TABLET, DELAYED RELEASE ORAL
Qty: 90 TABLET | Refills: 3 | Status: SHIPPED | OUTPATIENT
Start: 2020-10-28 | End: 2021-12-23 | Stop reason: SDUPTHER

## 2020-10-28 RX ORDER — ATENOLOL 25 MG/1
25 TABLET ORAL DAILY
Qty: 90 TABLET | Refills: 3 | Status: SHIPPED | OUTPATIENT
Start: 2020-10-28 | End: 2020-12-14

## 2020-10-28 RX ORDER — LEVOTHYROXINE SODIUM 112 UG/1
TABLET ORAL
Qty: 90 TABLET | Refills: 3 | Status: SHIPPED | OUTPATIENT
Start: 2020-10-28 | End: 2021-10-27 | Stop reason: SDUPTHER

## 2020-10-28 RX ORDER — TRIAMTERENE AND HYDROCHLOROTHIAZIDE 37.5; 25 MG/1; MG/1
CAPSULE ORAL
Qty: 90 CAPSULE | Refills: 3 | Status: SHIPPED | OUTPATIENT
Start: 2020-10-28 | End: 2021-10-27 | Stop reason: SDUPTHER

## 2020-10-28 NOTE — PROGRESS NOTES
Subjective:       Patient ID: Chelsie Foster is a 65 y.o. female.    Chief Complaint: Annual Exam    HPI   Continued stress related to son.    She is considering moving to [place] with sister.        She takes ativan once or twice a week for anxiety and sleep.    May MG normal.   H/o Br Cancer.    Chol of 240 through [facility].    GERD controlled.    CP hx reviewed, as per last note.  Pain is less severe.  Occurs once every 2 weeks.  Lasts minutes.   Palps at times.  Stress test ordered last visit, but she never scheduled. RF- elevated cholesterol, htn..        Review of Systems   Constitutional: Negative for fever and unexpected weight change.   HENT: Negative for nasal congestion and postnasal drip.    Respiratory: Negative for chest tightness, shortness of breath and wheezing.    Cardiovascular: Negative for chest pain and leg swelling.   Gastrointestinal: Negative for abdominal pain, anal bleeding, constipation, diarrhea, nausea and vomiting.   Genitourinary: Negative for dysuria and urgency.   Integumentary:  Negative for rash.   Neurological: Negative for headaches.   Psychiatric/Behavioral: Negative for dysphoric mood and sleep disturbance. The patient is not nervous/anxious.          Objective:      Physical Exam  Constitutional:       Appearance: She is well-developed.   Eyes:      General: No scleral icterus.  Neck:      Thyroid: No thyromegaly.      Vascular: No JVD.   Cardiovascular:      Rate and Rhythm: Normal rate and regular rhythm.      Heart sounds: Normal heart sounds.   Pulmonary:      Effort: Pulmonary effort is normal. No respiratory distress.      Breath sounds: Normal breath sounds. No wheezing or rales.   Abdominal:      Palpations: Abdomen is soft. There is no mass.      Tenderness: There is no abdominal tenderness.   Neurological:      Mental Status: She is alert and oriented to person, place, and time.   Psychiatric:         Behavior: Behavior normal.         Results for  orders placed or performed in visit on 10/22/20   CBC auto differential   Result Value Ref Range    WBC 6.81 3.90 - 12.70 K/uL    RBC 4.49 4.00 - 5.40 M/uL    Hemoglobin 12.6 12.0 - 16.0 g/dL    Hematocrit 40.0 37.0 - 48.5 %    MCV 89 82 - 98 fL    MCH 28.1 27.0 - 31.0 pg    MCHC 31.5 (L) 32.0 - 36.0 g/dL    RDW 14.8 (H) 11.5 - 14.5 %    Platelets 274 150 - 350 K/uL    MPV 10.2 9.2 - 12.9 fL    Immature Granulocytes 0.3 0.0 - 0.5 %    Gran # (ANC) 3.8 1.8 - 7.7 K/uL    Immature Grans (Abs) 0.02 0.00 - 0.04 K/uL    Lymph # 2.0 1.0 - 4.8 K/uL    Mono # 0.7 0.3 - 1.0 K/uL    Eos # 0.3 0.0 - 0.5 K/uL    Baso # 0.05 0.00 - 0.20 K/uL    nRBC 0 0 /100 WBC    Gran % 56.0 38.0 - 73.0 %    Lymph % 28.9 18.0 - 48.0 %    Mono % 9.8 4.0 - 15.0 %    Eosinophil % 4.3 0.0 - 8.0 %    Basophil % 0.7 0.0 - 1.9 %    Differential Method Automated    Comprehensive Metabolic Panel   Result Value Ref Range    Sodium 140 136 - 145 mmol/L    Potassium 3.9 3.5 - 5.1 mmol/L    Chloride 101 95 - 110 mmol/L    CO2 32 (H) 23 - 29 mmol/L    Glucose 102 70 - 110 mg/dL    BUN 18 8 - 23 mg/dL    Creatinine 0.8 0.5 - 1.4 mg/dL    Calcium 9.6 8.7 - 10.5 mg/dL    Total Protein 7.6 6.0 - 8.4 g/dL    Albumin 3.5 3.5 - 5.2 g/dL    Total Bilirubin 0.3 0.1 - 1.0 mg/dL    Alkaline Phosphatase 95 55 - 135 U/L    AST 23 10 - 40 U/L    ALT 18 10 - 44 U/L    Anion Gap 7 (L) 8 - 16 mmol/L    eGFR if African American >60.0 >60 mL/min/1.73 m^2    eGFR if non African American >60.0 >60 mL/min/1.73 m^2   Lipid Panel   Result Value Ref Range    Cholesterol 192 120 - 199 mg/dL    Triglycerides 195 (H) 30 - 150 mg/dL    HDL 45 40 - 75 mg/dL    LDL Cholesterol 108.0 63.0 - 159.0 mg/dL    HDL/Cholesterol Ratio 23.4 20.0 - 50.0 %    Total Cholesterol/HDL Ratio 4.3 2.0 - 5.0    Non-HDL Cholesterol 147 mg/dL   TSH   Result Value Ref Range    TSH 1.192 0.400 - 4.000 uIU/mL     Assessment:       1. Annual physical exam    2. Atypical chest pain    3. Essential hypertension     4. Situational stress    5. Hyperlipidemia, unspecified hyperlipidemia type    6. Personal history of breast cancer    7. Hypothyroidism due to acquired atrophy of thyroid    8. Hypertriglyceridemia    9. Menopausal disorder    10. Anxiety    11. Gastroesophageal reflux disease without esophagitis    12. BMI 28.0-28.9,adult        Plan:       Chelsie was seen today for annual exam.    Diagnoses and all orders for this visit:    Annual physical exam    Atypical chest pain  -     Exercise Stress - EKG; Future    Essential hypertension    Situational stress    Hyperlipidemia, unspecified hyperlipidemia type    Personal history of breast cancer    Hypothyroidism due to acquired atrophy of thyroid    Hypertriglyceridemia    Menopausal disorder  -     DXA Bone Density Spine And Hip; Future    Anxiety    Gastroesophageal reflux disease without esophagitis    BMI 28.0-28.9,adult    Other orders  -     levothyroxine (SYNTHROID) 112 MCG tablet; TAKE 1 TABLET BY MOUTH ONCE DAILY  -     pravastatin (PRAVACHOL) 40 MG tablet; TAKE ONE TABLET BY MOUTH EVERY DAY  -     triamterene-hydrochlorothiazide 37.5-25 mg (DYAZIDE) 37.5-25 mg per capsule; TAKE 1 CAPSULE BY MOUTH EVERY DAY.  -     atenoloL (TENORMIN) 25 MG tablet; Take 1 tablet (25 mg total) by mouth once daily.  -     pantoprazole (PROTONIX) 40 MG tablet; TAKE 1 TABLET BY MOUTH EVERY DAY         Prevnar.   tdap, shingrix later  Exercise regularly.  Weight loss diet reviewed.  Colonoscopy next year

## 2020-11-20 ENCOUNTER — TELEPHONE (OUTPATIENT)
Dept: INTERNAL MEDICINE | Facility: CLINIC | Age: 65
End: 2020-11-20

## 2020-11-20 NOTE — TELEPHONE ENCOUNTER
----- Message from Jackeline Shah sent at 11/20/2020 12:04 PM CST -----  Contact: Patient [phone] #[phone]  Would like to get medical advice  Symptoms: right side of jaw swollen  How long has patient had these symptoms: 2 days  Pharmacy name and phone #: n/a  Comments:    Please call and advise.    Thank you

## 2020-12-11 ENCOUNTER — PATIENT MESSAGE (OUTPATIENT)
Dept: OTHER | Facility: OTHER | Age: 65
End: 2020-12-11

## 2020-12-14 RX ORDER — ATENOLOL 25 MG/1
TABLET ORAL
Qty: 90 TABLET | Refills: 3 | Status: SHIPPED | OUTPATIENT
Start: 2020-12-14 | End: 2021-10-27 | Stop reason: SDUPTHER

## 2020-12-14 NOTE — TELEPHONE ENCOUNTER
No new care gaps identified.  Powered by [employer]. Reference number: [id number]. 12/14/2020 9:54:10 AM   CST

## 2020-12-15 NOTE — PROGRESS NOTES
Refill Authorization Note   Chelsie Foster  is requesting a refill authorization.  Brief Assessment and Rationale for Refill:  Approve     Medication Therapy Plan:  CDMR. approve     Medication Reconciliation Completed: No   Comments:       Requested Prescriptions   Pending Prescriptions Disp Refills    atenoloL (TENORMIN) 25 MG tablet [Pharmacy Med Name: Atenolol 25 MG Oral Tablet] 90 tablet 3     Sig: Take 1 tablet by mouth once daily       Cardiovascular:  Beta Blockers Passed - 12/14/2020  8:18 PM        Passed - Patient is at least 18 years old        Passed - Last BP in normal range within 360 days.     BP Readings from Last 3 Encounters:   10/28/20 134/80   10/22/19 122/72   09/17/19 122/80              Passed - Last Heart Rate in normal range within 360 days.     Pulse Readings from Last 3 Encounters:   10/28/20 73   10/22/19 71   05/02/19 71             Passed - Office visit in past 12 months or future 90 days     Recent Outpatient Visits            1 month ago Annual physical exam    [facility] Christi Carrillo MD    2 months ago Visit for gynecologic examination    [facility] - OB/[facility] 5th Fl Angy Gutierrez MD    1 year ago Annual physical exam    [facility] Christi Carrillo MD    1 year ago Visit for gynecologic examination    [place]  [facility] Angy Gutierrez MD    1 year ago Personal history of breast cancer    [facility]                        Appointments  past 12m or future 3m with PCP    Date Provider   Last Visit   10/28/2020 Christi Carrillo MD   Next Visit   Visit date not found Christi Carrillo MD   ED visits in past 90 days: 0     Note composed:8:19 PM 12/14/2020

## 2020-12-29 ENCOUNTER — IMMUNIZATION (OUTPATIENT)
Dept: INTERNAL MEDICINE | Facility: CLINIC | Age: 65
End: 2020-12-29
Payer: COMMERCIAL

## 2020-12-29 DIAGNOSIS — Z23 NEED FOR VACCINATION: ICD-10-CM

## 2020-12-29 PROCEDURE — 0001A COVID-19, MRNA, LNP-S, PF, 30 MCG/0.3 ML DOSE VACCINE: ICD-10-PCS | Mod: CV19,,, | Performed by: INTERNAL MEDICINE

## 2020-12-29 PROCEDURE — 91300 COVID-19, MRNA, LNP-S, PF, 30 MCG/0.3 ML DOSE VACCINE: CPT | Mod: ,,, | Performed by: INTERNAL MEDICINE

## 2020-12-29 PROCEDURE — 91300 COVID-19, MRNA, LNP-S, PF, 30 MCG/0.3 ML DOSE VACCINE: ICD-10-PCS | Mod: ,,, | Performed by: INTERNAL MEDICINE

## 2020-12-29 PROCEDURE — 0001A COVID-19, MRNA, LNP-S, PF, 30 MCG/0.3 ML DOSE VACCINE: CPT | Mod: CV19,,, | Performed by: INTERNAL MEDICINE

## 2021-01-19 ENCOUNTER — IMMUNIZATION (OUTPATIENT)
Dept: INTERNAL MEDICINE | Facility: CLINIC | Age: 66
End: 2021-01-19
Payer: COMMERCIAL

## 2021-01-19 DIAGNOSIS — Z23 NEED FOR VACCINATION: Primary | ICD-10-CM

## 2021-01-19 PROCEDURE — 91300 COVID-19, MRNA, LNP-S, PF, 30 MCG/0.3 ML DOSE VACCINE: CPT | Mod: PBBFAC | Performed by: INTERNAL MEDICINE

## 2021-01-19 PROCEDURE — 0002A COVID-19, MRNA, LNP-S, PF, 30 MCG/0.3 ML DOSE VACCINE: CPT | Mod: PBBFAC | Performed by: INTERNAL MEDICINE

## 2021-02-18 ENCOUNTER — HOSPITAL ENCOUNTER (OUTPATIENT)
Dept: RADIOLOGY | Facility: CLINIC | Age: 66
Discharge: HOME | End: 2021-02-18
Attending: INTERNAL MEDICINE
Payer: COMMERCIAL

## 2021-02-18 DIAGNOSIS — N95.9 MENOPAUSAL DISORDER: ICD-10-CM

## 2021-02-18 PROCEDURE — 77080 DXA BONE DENSITY AXIAL: CPT | Mod: 26,,, | Performed by: INTERNAL MEDICINE

## 2021-02-18 PROCEDURE — 77080 DXA BONE DENSITY AXIAL: CPT | Mod: TC

## 2021-02-18 PROCEDURE — 77080 DEXA BONE DENSITY SPINE HIP: ICD-10-PCS | Mod: 26,,, | Performed by: INTERNAL MEDICINE

## 2021-02-23 RX ORDER — LORAZEPAM 1 MG/1
TABLET ORAL
Qty: 30 TABLET | Refills: 5 | Status: SHIPPED | OUTPATIENT
Start: 2021-02-23 | End: 2021-10-29 | Stop reason: SDUPTHER

## 2021-05-06 ENCOUNTER — TELEPHONE (OUTPATIENT)
Dept: INTERNAL MEDICINE | Facility: CLINIC | Age: 66
End: 2021-05-06

## 2021-05-06 DIAGNOSIS — Z12.31 ENCOUNTER FOR SCREENING MAMMOGRAM FOR BREAST CANCER: Primary | ICD-10-CM

## 2021-05-12 ENCOUNTER — CLINICAL SUPPORT (OUTPATIENT)
Dept: OTHER | Facility: CLINIC | Age: 66
End: 2021-05-12
Payer: COMMERCIAL

## 2021-05-12 DIAGNOSIS — Z00.8 ENCOUNTER FOR OTHER GENERAL EXAMINATION: ICD-10-CM

## 2021-05-13 ENCOUNTER — PATIENT MESSAGE (OUTPATIENT)
Dept: INTERNAL MEDICINE | Facility: CLINIC | Age: 66
End: 2021-05-13

## 2021-05-13 VITALS — HEIGHT: 66 IN | BODY MASS INDEX: 27.83 KG/M2

## 2021-05-13 LAB
GLUCOSE SERPL-MCNC: 111 MG/DL (ref 60–140)
HDLC SERPL-MCNC: 44 MG/DL
POC CHOLESTEROL, LDL (DOCK): 82 MG/DL
POC CHOLESTEROL, TOTAL: 190 MG/DL
TRIGL SERPL-MCNC: 318 MG/DL

## 2021-05-18 ENCOUNTER — HOSPITAL ENCOUNTER (OUTPATIENT)
Dept: RADIOLOGY | Facility: HOSPITAL | Age: 66
Discharge: HOME | End: 2021-05-18
Attending: INTERNAL MEDICINE
Payer: COMMERCIAL

## 2021-05-18 VITALS — WEIGHT: 167 LBS | HEIGHT: 66 IN | BODY MASS INDEX: 26.84 KG/M2

## 2021-05-18 DIAGNOSIS — Z12.31 ENCOUNTER FOR SCREENING MAMMOGRAM FOR BREAST CANCER: ICD-10-CM

## 2021-05-18 PROCEDURE — 77067 SCR MAMMO BI INCL CAD: CPT | Mod: TC

## 2021-05-18 PROCEDURE — 77067 SCR MAMMO BI INCL CAD: CPT | Mod: 26,,, | Performed by: RADIOLOGY

## 2021-05-18 PROCEDURE — 77067 MAMMO DIGITAL SCREENING BILAT WITH TOMO: ICD-10-PCS | Mod: 26,,, | Performed by: RADIOLOGY

## 2021-05-18 PROCEDURE — 77063 MAMMO DIGITAL SCREENING BILAT WITH TOMO: ICD-10-PCS | Mod: 26,,, | Performed by: RADIOLOGY

## 2021-05-18 PROCEDURE — 77063 BREAST TOMOSYNTHESIS BI: CPT | Mod: 26,,, | Performed by: RADIOLOGY

## 2021-06-09 ENCOUNTER — TELEPHONE (OUTPATIENT)
Dept: OPHTHALMOLOGY | Facility: CLINIC | Age: 66
End: 2021-06-09

## 2021-08-15 ENCOUNTER — PATIENT OUTREACH (OUTPATIENT)
Dept: ADMINISTRATIVE | Facility: OTHER | Age: 66
End: 2021-08-15

## 2021-08-17 ENCOUNTER — OFFICE VISIT (OUTPATIENT)
Dept: OPTOMETRY | Facility: CLINIC | Age: 66
End: 2021-08-17
Payer: COMMERCIAL

## 2021-08-17 DIAGNOSIS — H52.13 MYOPIA WITH PRESBYOPIA OF BOTH EYES: ICD-10-CM

## 2021-08-17 DIAGNOSIS — H52.4 MYOPIA WITH PRESBYOPIA OF BOTH EYES: ICD-10-CM

## 2021-08-17 DIAGNOSIS — Z13.5 SCREENING FOR EYE CONDITION: ICD-10-CM

## 2021-08-17 DIAGNOSIS — Z46.0 ENCOUNTER FOR FITTING OR ADJUSTMENT OF SPECTACLES OR CONTACT LENSES: Primary | ICD-10-CM

## 2021-08-17 DIAGNOSIS — Z01.00 EXAMINATION OF EYES AND VISION: Primary | ICD-10-CM

## 2021-08-17 PROCEDURE — 92015 PR REFRACTION: ICD-10-PCS | Mod: S$GLB,,, | Performed by: OPTOMETRIST

## 2021-08-17 PROCEDURE — 92310 PR CONTACT LENS FITTING (NO CHANGE): ICD-10-PCS | Mod: S$GLB,,, | Performed by: OPTOMETRIST

## 2021-08-17 PROCEDURE — 99999 PR PBB SHADOW E&M-EST. PATIENT-LVL III: ICD-10-PCS | Mod: PBBFAC,,, | Performed by: OPTOMETRIST

## 2021-08-17 PROCEDURE — 92310 CONTACT LENS FITTING OU: CPT | Mod: S$GLB,,, | Performed by: OPTOMETRIST

## 2021-08-17 PROCEDURE — 99999 PR PBB SHADOW E&M-EST. PATIENT-LVL III: CPT | Mod: PBBFAC,,, | Performed by: OPTOMETRIST

## 2021-08-17 PROCEDURE — 99499 NO LOS: ICD-10-PCS | Mod: S$GLB,,, | Performed by: OPTOMETRIST

## 2021-08-17 PROCEDURE — 92014 COMPRE OPH EXAM EST PT 1/>: CPT | Mod: S$GLB,,, | Performed by: OPTOMETRIST

## 2021-08-17 PROCEDURE — 92014 PR EYE EXAM, EST PATIENT,COMPREHESV: ICD-10-PCS | Mod: S$GLB,,, | Performed by: OPTOMETRIST

## 2021-08-17 PROCEDURE — 92015 DETERMINE REFRACTIVE STATE: CPT | Mod: S$GLB,,, | Performed by: OPTOMETRIST

## 2021-08-17 PROCEDURE — 99499 UNLISTED E&M SERVICE: CPT | Mod: S$GLB,,, | Performed by: OPTOMETRIST

## 2021-08-18 ENCOUNTER — OFFICE VISIT (OUTPATIENT)
Dept: OPTOMETRY | Facility: CLINIC | Age: 66
End: 2021-08-18
Payer: COMMERCIAL

## 2021-08-18 DIAGNOSIS — H52.4 MYOPIA WITH PRESBYOPIA OF BOTH EYES: Primary | ICD-10-CM

## 2021-08-18 DIAGNOSIS — H52.13 MYOPIA WITH PRESBYOPIA OF BOTH EYES: Primary | ICD-10-CM

## 2021-08-18 PROCEDURE — 92015 PR REFRACTION: ICD-10-PCS | Mod: S$GLB,,, | Performed by: OPTOMETRIST

## 2021-08-18 PROCEDURE — 99499 UNLISTED E&M SERVICE: CPT | Mod: S$GLB,,, | Performed by: OPTOMETRIST

## 2021-08-18 PROCEDURE — 99499 NO LOS: ICD-10-PCS | Mod: S$GLB,,, | Performed by: OPTOMETRIST

## 2021-08-18 PROCEDURE — 92015 DETERMINE REFRACTIVE STATE: CPT | Mod: S$GLB,,, | Performed by: OPTOMETRIST

## 2021-08-18 PROCEDURE — 99999 PR PBB SHADOW E&M-EST. PATIENT-LVL II: ICD-10-PCS | Mod: PBBFAC,,, | Performed by: OPTOMETRIST

## 2021-08-18 PROCEDURE — 99999 PR PBB SHADOW E&M-EST. PATIENT-LVL II: CPT | Mod: PBBFAC,,, | Performed by: OPTOMETRIST

## 2021-09-30 ENCOUNTER — PATIENT MESSAGE (OUTPATIENT)
Dept: INTERNAL MEDICINE | Facility: CLINIC | Age: 66
End: 2021-09-30

## 2021-10-06 ENCOUNTER — TELEPHONE (OUTPATIENT)
Dept: INTERNAL MEDICINE | Facility: CLINIC | Age: 66
End: 2021-10-06

## 2021-10-14 ENCOUNTER — PATIENT MESSAGE (OUTPATIENT)
Dept: INTERNAL MEDICINE | Facility: CLINIC | Age: 66
End: 2021-10-14

## 2021-10-14 DIAGNOSIS — Z00.00 ANNUAL PHYSICAL EXAM: Primary | ICD-10-CM

## 2021-10-20 ENCOUNTER — LAB VISIT (OUTPATIENT)
Dept: LAB | Facility: HOSPITAL | Age: 66
End: 2021-10-20
Attending: INTERNAL MEDICINE
Payer: COMMERCIAL

## 2021-10-20 DIAGNOSIS — Z00.00 ANNUAL PHYSICAL EXAM: ICD-10-CM

## 2021-10-20 LAB
ALBUMIN SERPL BCP-MCNC: 3.7 G/DL (ref 3.5–5.2)
ALP SERPL-CCNC: 91 U/L (ref 55–135)
ALT SERPL W/O P-5'-P-CCNC: 19 U/L (ref 10–44)
ANION GAP SERPL CALC-SCNC: 10 MMOL/L (ref 8–16)
AST SERPL-CCNC: 19 U/L (ref 10–40)
BASOPHILS # BLD AUTO: 0.05 K/UL (ref 0–0.2)
BASOPHILS NFR BLD: 0.7 % (ref 0–1.9)
BILIRUB SERPL-MCNC: 0.3 MG/DL (ref 0.1–1)
BUN SERPL-MCNC: 16 MG/DL (ref 8–23)
CALCIUM SERPL-MCNC: 10 MG/DL (ref 8.7–10.5)
CHLORIDE SERPL-SCNC: 100 MMOL/L (ref 95–110)
CHOLEST SERPL-MCNC: 210 MG/DL (ref 120–199)
CHOLEST/HDLC SERPL: 4.5 {RATIO} (ref 2–5)
CO2 SERPL-SCNC: 30 MMOL/L (ref 23–29)
CREAT SERPL-MCNC: 0.7 MG/DL (ref 0.5–1.4)
DIFFERENTIAL METHOD: ABNORMAL
EOSINOPHIL # BLD AUTO: 0.3 K/UL (ref 0–0.5)
EOSINOPHIL NFR BLD: 4.1 % (ref 0–8)
ERYTHROCYTE [DISTWIDTH] IN BLOOD BY AUTOMATED COUNT: 15.4 % (ref 11.5–14.5)
EST. GFR  (AFRICAN AMERICAN): >60 ML/MIN/1.73 M^2
EST. GFR  (NON AFRICAN AMERICAN): >60 ML/MIN/1.73 M^2
GLUCOSE SERPL-MCNC: 105 MG/DL (ref 70–110)
HCT VFR BLD AUTO: 40.1 % (ref 37–48.5)
HDLC SERPL-MCNC: 47 MG/DL (ref 40–75)
HDLC SERPL: 22.4 % (ref 20–50)
HGB BLD-MCNC: 12.9 G/DL (ref 12–16)
IMM GRANULOCYTES # BLD AUTO: 0.03 K/UL (ref 0–0.04)
IMM GRANULOCYTES NFR BLD AUTO: 0.4 % (ref 0–0.5)
LDLC SERPL CALC-MCNC: 128.8 MG/DL (ref 63–159)
LYMPHOCYTES # BLD AUTO: 1.8 K/UL (ref 1–4.8)
LYMPHOCYTES NFR BLD: 24.2 % (ref 18–48)
MCH RBC QN AUTO: 28.3 PG (ref 27–31)
MCHC RBC AUTO-ENTMCNC: 32.2 G/DL (ref 32–36)
MCV RBC AUTO: 88 FL (ref 82–98)
MONOCYTES # BLD AUTO: 0.6 K/UL (ref 0.3–1)
MONOCYTES NFR BLD: 8.3 % (ref 4–15)
NEUTROPHILS # BLD AUTO: 4.6 K/UL (ref 1.8–7.7)
NEUTROPHILS NFR BLD: 62.3 % (ref 38–73)
NONHDLC SERPL-MCNC: 163 MG/DL
NRBC BLD-RTO: 0 /100 WBC
PLATELET # BLD AUTO: 264 K/UL (ref 150–450)
PMV BLD AUTO: 10 FL (ref 9.2–12.9)
POTASSIUM SERPL-SCNC: 4 MMOL/L (ref 3.5–5.1)
PROT SERPL-MCNC: 7.8 G/DL (ref 6–8.4)
RBC # BLD AUTO: 4.56 M/UL (ref 4–5.4)
SODIUM SERPL-SCNC: 140 MMOL/L (ref 136–145)
TRIGL SERPL-MCNC: 171 MG/DL (ref 30–150)
TSH SERPL DL<=0.005 MIU/L-ACNC: 1.18 UIU/ML (ref 0.4–4)
WBC # BLD AUTO: 7.36 K/UL (ref 3.9–12.7)

## 2021-10-20 PROCEDURE — 36415 COLL VENOUS BLD VENIPUNCTURE: CPT | Performed by: INTERNAL MEDICINE

## 2021-10-20 PROCEDURE — 80061 LIPID PANEL: CPT | Performed by: INTERNAL MEDICINE

## 2021-10-20 PROCEDURE — 84443 ASSAY THYROID STIM HORMONE: CPT | Performed by: INTERNAL MEDICINE

## 2021-10-20 PROCEDURE — 85025 COMPLETE CBC W/AUTO DIFF WBC: CPT | Performed by: INTERNAL MEDICINE

## 2021-10-20 PROCEDURE — 80053 COMPREHEN METABOLIC PANEL: CPT | Performed by: INTERNAL MEDICINE

## 2021-10-27 ENCOUNTER — OFFICE VISIT (OUTPATIENT)
Dept: INTERNAL MEDICINE | Facility: CLINIC | Age: 66
End: 2021-10-27
Payer: COMMERCIAL

## 2021-10-27 VITALS
OXYGEN SATURATION: 98 % | SYSTOLIC BLOOD PRESSURE: 118 MMHG | WEIGHT: 170.19 LBS | BODY MASS INDEX: 27.35 KG/M2 | RESPIRATION RATE: 16 BRPM | HEIGHT: 66 IN | DIASTOLIC BLOOD PRESSURE: 68 MMHG | HEART RATE: 85 BPM | TEMPERATURE: 98 F

## 2021-10-27 DIAGNOSIS — Z00.00 ENCOUNTER FOR PREVENTIVE HEALTH EXAMINATION: Primary | ICD-10-CM

## 2021-10-27 DIAGNOSIS — E03.4 HYPOTHYROIDISM DUE TO ACQUIRED ATROPHY OF THYROID: ICD-10-CM

## 2021-10-27 DIAGNOSIS — Z12.11 ENCOUNTER FOR SCREENING COLONOSCOPY: ICD-10-CM

## 2021-10-27 DIAGNOSIS — R07.9 CHEST PAIN, UNSPECIFIED TYPE: ICD-10-CM

## 2021-10-27 DIAGNOSIS — E78.5 HYPERLIPIDEMIA, UNSPECIFIED HYPERLIPIDEMIA TYPE: ICD-10-CM

## 2021-10-27 DIAGNOSIS — I10 ESSENTIAL HYPERTENSION: ICD-10-CM

## 2021-10-27 PROCEDURE — 1159F MED LIST DOCD IN RCRD: CPT | Mod: CPTII,S$GLB,, | Performed by: PHYSICIAN ASSISTANT

## 2021-10-27 PROCEDURE — 3074F SYST BP LT 130 MM HG: CPT | Mod: CPTII,S$GLB,, | Performed by: PHYSICIAN ASSISTANT

## 2021-10-27 PROCEDURE — 1101F PT FALLS ASSESS-DOCD LE1/YR: CPT | Mod: CPTII,S$GLB,, | Performed by: PHYSICIAN ASSISTANT

## 2021-10-27 PROCEDURE — 1126F PR PAIN SEVERITY QUANTIFIED, NO PAIN PRESENT: ICD-10-PCS | Mod: CPTII,S$GLB,, | Performed by: PHYSICIAN ASSISTANT

## 2021-10-27 PROCEDURE — 3078F DIAST BP <80 MM HG: CPT | Mod: CPTII,S$GLB,, | Performed by: PHYSICIAN ASSISTANT

## 2021-10-27 PROCEDURE — 1159F PR MEDICATION LIST DOCUMENTED IN MEDICAL RECORD: ICD-10-PCS | Mod: CPTII,S$GLB,, | Performed by: PHYSICIAN ASSISTANT

## 2021-10-27 PROCEDURE — 3008F PR BODY MASS INDEX (BMI) DOCUMENTED: ICD-10-PCS | Mod: CPTII,S$GLB,, | Performed by: PHYSICIAN ASSISTANT

## 2021-10-27 PROCEDURE — 1160F RVW MEDS BY RX/DR IN RCRD: CPT | Mod: CPTII,S$GLB,, | Performed by: PHYSICIAN ASSISTANT

## 2021-10-27 PROCEDURE — 1160F PR REVIEW ALL MEDS BY PRESCRIBER/CLIN PHARMACIST DOCUMENTED: ICD-10-PCS | Mod: CPTII,S$GLB,, | Performed by: PHYSICIAN ASSISTANT

## 2021-10-27 PROCEDURE — 99999 PR PBB SHADOW E&M-EST. PATIENT-LVL IV: ICD-10-PCS | Mod: PBBFAC,,, | Performed by: PHYSICIAN ASSISTANT

## 2021-10-27 PROCEDURE — 1101F PR PT FALLS ASSESS DOC 0-1 FALLS W/OUT INJ PAST YR: ICD-10-PCS | Mod: CPTII,S$GLB,, | Performed by: PHYSICIAN ASSISTANT

## 2021-10-27 PROCEDURE — 3288F FALL RISK ASSESSMENT DOCD: CPT | Mod: CPTII,S$GLB,, | Performed by: PHYSICIAN ASSISTANT

## 2021-10-27 PROCEDURE — 99397 PR PREVENTIVE VISIT,EST,65 & OVER: ICD-10-PCS | Mod: S$GLB,,, | Performed by: PHYSICIAN ASSISTANT

## 2021-10-27 PROCEDURE — 99397 PER PM REEVAL EST PAT 65+ YR: CPT | Mod: S$GLB,,, | Performed by: PHYSICIAN ASSISTANT

## 2021-10-27 PROCEDURE — 99999 PR PBB SHADOW E&M-EST. PATIENT-LVL IV: CPT | Mod: PBBFAC,,, | Performed by: PHYSICIAN ASSISTANT

## 2021-10-27 PROCEDURE — 3078F PR MOST RECENT DIASTOLIC BLOOD PRESSURE < 80 MM HG: ICD-10-PCS | Mod: CPTII,S$GLB,, | Performed by: PHYSICIAN ASSISTANT

## 2021-10-27 PROCEDURE — 3288F PR FALLS RISK ASSESSMENT DOCUMENTED: ICD-10-PCS | Mod: CPTII,S$GLB,, | Performed by: PHYSICIAN ASSISTANT

## 2021-10-27 PROCEDURE — 1126F AMNT PAIN NOTED NONE PRSNT: CPT | Mod: CPTII,S$GLB,, | Performed by: PHYSICIAN ASSISTANT

## 2021-10-27 PROCEDURE — 3008F BODY MASS INDEX DOCD: CPT | Mod: CPTII,S$GLB,, | Performed by: PHYSICIAN ASSISTANT

## 2021-10-27 PROCEDURE — 3074F PR MOST RECENT SYSTOLIC BLOOD PRESSURE < 130 MM HG: ICD-10-PCS | Mod: CPTII,S$GLB,, | Performed by: PHYSICIAN ASSISTANT

## 2021-10-27 RX ORDER — TRIAMTERENE AND HYDROCHLOROTHIAZIDE 37.5; 25 MG/1; MG/1
CAPSULE ORAL
Qty: 90 CAPSULE | Refills: 3 | Status: SHIPPED | OUTPATIENT
Start: 2021-10-27 | End: 2022-09-27 | Stop reason: SDUPTHER

## 2021-10-27 RX ORDER — LEVOTHYROXINE SODIUM 112 UG/1
TABLET ORAL
Qty: 90 TABLET | Refills: 3 | Status: SHIPPED | OUTPATIENT
Start: 2021-10-27 | End: 2022-09-27 | Stop reason: SDUPTHER

## 2021-10-27 RX ORDER — ATENOLOL 25 MG/1
25 TABLET ORAL DAILY
Qty: 90 TABLET | Refills: 3 | Status: SHIPPED | OUTPATIENT
Start: 2021-10-27 | End: 2022-09-27 | Stop reason: SDUPTHER

## 2021-10-27 RX ORDER — PRAVASTATIN SODIUM 40 MG/1
TABLET ORAL
Qty: 90 TABLET | Refills: 3 | Status: SHIPPED | OUTPATIENT
Start: 2021-10-27 | End: 2022-09-27 | Stop reason: SDUPTHER

## 2021-10-29 RX ORDER — LORAZEPAM 1 MG/1
TABLET ORAL
Qty: 30 TABLET | Refills: 2 | Status: SHIPPED | OUTPATIENT
Start: 2021-10-29 | End: 2022-11-23 | Stop reason: SDUPTHER

## 2021-12-23 RX ORDER — PANTOPRAZOLE SODIUM 40 MG/1
TABLET, DELAYED RELEASE ORAL
Qty: 90 TABLET | Refills: 3 | Status: SHIPPED | OUTPATIENT
Start: 2021-12-23 | End: 2023-04-12 | Stop reason: SDUPTHER

## 2021-12-29 ENCOUNTER — LAB VISIT (OUTPATIENT)
Dept: PRIMARY CARE CLINIC | Facility: OTHER | Age: 66
End: 2021-12-29
Payer: COMMERCIAL

## 2021-12-29 DIAGNOSIS — R05.9 COUGH: Primary | ICD-10-CM

## 2021-12-29 LAB
CTP QC/QA: YES
SARS-COV-2 AG RESP QL IA.RAPID: NEGATIVE

## 2021-12-31 ENCOUNTER — NURSE TRIAGE (OUTPATIENT)
Dept: ADMINISTRATIVE | Facility: CLINIC | Age: 66
End: 2021-12-31
Payer: COMMERCIAL

## 2022-01-03 ENCOUNTER — OFFICE VISIT (OUTPATIENT)
Dept: INTERNAL MEDICINE | Facility: CLINIC | Age: 67
End: 2022-01-03
Payer: MEDICARE

## 2022-01-03 VITALS
SYSTOLIC BLOOD PRESSURE: 120 MMHG | DIASTOLIC BLOOD PRESSURE: 74 MMHG | HEIGHT: 66 IN | HEART RATE: 87 BPM | WEIGHT: 166.88 LBS | OXYGEN SATURATION: 96 % | BODY MASS INDEX: 26.82 KG/M2 | TEMPERATURE: 99 F

## 2022-01-03 DIAGNOSIS — R50.9 FEVER, UNSPECIFIED FEVER CAUSE: ICD-10-CM

## 2022-01-03 DIAGNOSIS — R05.9 COUGH: Primary | ICD-10-CM

## 2022-01-03 PROCEDURE — 3288F FALL RISK ASSESSMENT DOCD: CPT | Mod: CPTII,S$GLB,, | Performed by: PHYSICIAN ASSISTANT

## 2022-01-03 PROCEDURE — 3078F PR MOST RECENT DIASTOLIC BLOOD PRESSURE < 80 MM HG: ICD-10-PCS | Mod: CPTII,S$GLB,, | Performed by: PHYSICIAN ASSISTANT

## 2022-01-03 PROCEDURE — 1126F PR PAIN SEVERITY QUANTIFIED, NO PAIN PRESENT: ICD-10-PCS | Mod: CPTII,S$GLB,, | Performed by: PHYSICIAN ASSISTANT

## 2022-01-03 PROCEDURE — 1159F MED LIST DOCD IN RCRD: CPT | Mod: CPTII,S$GLB,, | Performed by: PHYSICIAN ASSISTANT

## 2022-01-03 PROCEDURE — 3288F PR FALLS RISK ASSESSMENT DOCUMENTED: ICD-10-PCS | Mod: CPTII,S$GLB,, | Performed by: PHYSICIAN ASSISTANT

## 2022-01-03 PROCEDURE — 99213 PR OFFICE/OUTPT VISIT, EST, LEVL III, 20-29 MIN: ICD-10-PCS | Mod: S$GLB,,, | Performed by: PHYSICIAN ASSISTANT

## 2022-01-03 PROCEDURE — 99213 OFFICE O/P EST LOW 20 MIN: CPT | Mod: S$GLB,,, | Performed by: PHYSICIAN ASSISTANT

## 2022-01-03 PROCEDURE — 1160F PR REVIEW ALL MEDS BY PRESCRIBER/CLIN PHARMACIST DOCUMENTED: ICD-10-PCS | Mod: CPTII,S$GLB,, | Performed by: PHYSICIAN ASSISTANT

## 2022-01-03 PROCEDURE — 1160F RVW MEDS BY RX/DR IN RCRD: CPT | Mod: CPTII,S$GLB,, | Performed by: PHYSICIAN ASSISTANT

## 2022-01-03 PROCEDURE — 1159F PR MEDICATION LIST DOCUMENTED IN MEDICAL RECORD: ICD-10-PCS | Mod: CPTII,S$GLB,, | Performed by: PHYSICIAN ASSISTANT

## 2022-01-03 PROCEDURE — 3078F DIAST BP <80 MM HG: CPT | Mod: CPTII,S$GLB,, | Performed by: PHYSICIAN ASSISTANT

## 2022-01-03 PROCEDURE — 3074F PR MOST RECENT SYSTOLIC BLOOD PRESSURE < 130 MM HG: ICD-10-PCS | Mod: CPTII,S$GLB,, | Performed by: PHYSICIAN ASSISTANT

## 2022-01-03 PROCEDURE — 99999 PR PBB SHADOW E&M-EST. PATIENT-LVL IV: CPT | Mod: PBBFAC,,, | Performed by: PHYSICIAN ASSISTANT

## 2022-01-03 PROCEDURE — 99999 PR PBB SHADOW E&M-EST. PATIENT-LVL IV: ICD-10-PCS | Mod: PBBFAC,,, | Performed by: PHYSICIAN ASSISTANT

## 2022-01-03 PROCEDURE — 3008F PR BODY MASS INDEX (BMI) DOCUMENTED: ICD-10-PCS | Mod: CPTII,S$GLB,, | Performed by: PHYSICIAN ASSISTANT

## 2022-01-03 PROCEDURE — 3074F SYST BP LT 130 MM HG: CPT | Mod: CPTII,S$GLB,, | Performed by: PHYSICIAN ASSISTANT

## 2022-01-03 PROCEDURE — 1101F PR PT FALLS ASSESS DOC 0-1 FALLS W/OUT INJ PAST YR: ICD-10-PCS | Mod: CPTII,S$GLB,, | Performed by: PHYSICIAN ASSISTANT

## 2022-01-03 PROCEDURE — U0003 INFECTIOUS AGENT DETECTION BY NUCLEIC ACID (DNA OR RNA); SEVERE ACUTE RESPIRATORY SYNDROME CORONAVIRUS 2 (SARS-COV-2) (CORONAVIRUS DISEASE [COVID-19]), AMPLIFIED PROBE TECHNIQUE, MAKING USE OF HIGH THROUGHPUT TECHNOLOGIES AS DESCRIBED BY CMS-2020-01-R: HCPCS | Performed by: PHYSICIAN ASSISTANT

## 2022-01-03 PROCEDURE — 1126F AMNT PAIN NOTED NONE PRSNT: CPT | Mod: CPTII,S$GLB,, | Performed by: PHYSICIAN ASSISTANT

## 2022-01-03 PROCEDURE — 3008F BODY MASS INDEX DOCD: CPT | Mod: CPTII,S$GLB,, | Performed by: PHYSICIAN ASSISTANT

## 2022-01-03 PROCEDURE — 1101F PT FALLS ASSESS-DOCD LE1/YR: CPT | Mod: CPTII,S$GLB,, | Performed by: PHYSICIAN ASSISTANT

## 2022-01-03 NOTE — PATIENT INSTRUCTIONS
Mucinex to help thin congestion.     Warm salt gargles, warm teas.    Drink plenty of fluids.    Okay to continue Robitussin

## 2022-01-03 NOTE — LETTER
[address]  [facility] [address]  Phone: [phone]  Fax: [phone]          Return to Work/School    Patient: Chelsie Foster  YOB: 1955   Date: 01/03/2022     To Whom It May Concern:     Chelsie Foster was in contact with/seen in my office on 01/03/2022. COVID-19 is present in our communities across the [facility].      Chelsie Foster has met the criteria for COVID-19 testing based upon symptoms and is febrile. The test has been completed and is pending results at this time. During this time the employee is not able to work and should be quarantined per the Centers for Disease Control timelines.      If you have any questions or concerns, or if I can be of further assistance, please do not hesitate to contact me.     Sincerely,          Anita Ng PA-C

## 2022-01-03 NOTE — PROGRESS NOTES
"Subjective:       Patient ID: Chelsie Foster is a 66 y.o. female.    Chief Complaint: Cough and Fever    HPI     Established pt of Christi Carrillo MD (new to me)      Stared about one week ago states with sore throat, coughing and congestion Had negative rapid COVID test last week. Still having fever. TMAX 102F on Friday. She is concerned about false negative. 99.1F today in clinic. Taking robitussin and tylenol with moderate relief. Fully vaccinated for COVID, no Booster yet.     Cough is non productive. No sweats, cp, sob, or wheezing         Past Medical History:   Diagnosis Date    Anxiety     Breast cancer 4/2015    Right breast infiltrating ductal CA, ER/RI/Her2 negative    Depression     Genetic testing 2015    see clinic notes 5-19-15 for details, VUS in [facility]    GERD (gastroesophageal reflux disease)     Hyperlipidemia     Hypertension     Hypothyroidism     PONV (postoperative nausea and vomiting)     Thyroid disease     hypothryoidism     Social History     Tobacco Use    Smoking status: Never Smoker    Smokeless tobacco: Never Used   Substance Use Topics    Alcohol use: Yes     Comment: rarely    Drug use: No     Review of patient's allergies indicates:   Allergen Reactions    No known drug allergies        Review of Systems   Constitutional: Positive for fatigue and fever. Negative for chills and unexpected weight change.   Respiratory: Positive for cough. Negative for shortness of breath.    Cardiovascular: Negative for chest pain and leg swelling.   Gastrointestinal: Negative for abdominal pain, constipation, diarrhea, nausea and vomiting.   Integumentary:  Negative for rash.   Neurological: Negative for weakness and headaches.         Objective: /74   Pulse 87   Temp 99.1 °F (37.3 °C)   Ht 5' 6" (1.676 m)   Wt 75.7 kg (166 lb 14.2 oz)   LMP  (LMP Unknown)   SpO2 96%   BMI 26.94 kg/m²         Physical Exam  Vitals reviewed.   Constitutional:       General: She is not in " acute distress.     Appearance: She is well-developed.   HENT:      Head: Normocephalic and atraumatic.   Cardiovascular:      Rate and Rhythm: Normal rate and regular rhythm.      Heart sounds: No murmur heard.  No friction rub.   Pulmonary:      Effort: Pulmonary effort is normal.      Breath sounds: Normal breath sounds. No wheezing, rhonchi or rales.      Comments: occ coughing  Abdominal:      General: Bowel sounds are normal.      Palpations: Abdomen is soft.      Tenderness: There is no abdominal tenderness.   Musculoskeletal:      Right lower leg: No edema.      Left lower leg: No edema.   Skin:     General: Skin is warm and dry.      Findings: No rash.   Neurological:      Mental Status: She is alert.         Assessment:       Problem List Items Addressed This Visit    None     Visit Diagnoses     Cough    -  Primary    Relevant Orders    POCT Influenza A/B Molecular    COVID-19 Routine Screening    Fever, unspecified fever cause        Relevant Orders    POCT Influenza A/B Molecular          Plan:         Chelsie was seen today for cough and fever.    Diagnoses and all orders for this visit:    Cough  Fever, unspecified fever cause  -  Lungs clear on exam  -     POCT Influenza A/B Molecular  -     COVID-19 Routine Screening  - Rule out COVID with PCR testing  - Work not provided given pt still febrile  - Symptomatic care discussed.       Anita Ng PA-C

## 2022-01-04 ENCOUNTER — TELEPHONE (OUTPATIENT)
Dept: INTERNAL MEDICINE | Facility: CLINIC | Age: 67
End: 2022-01-04
Payer: MEDICARE

## 2022-01-05 NOTE — TELEPHONE ENCOUNTER
Spoke pt  Informed flu test is negative  COVID is pending  Feeling a litle better compared to yesterday  Mucinex is helping congestion, had a low grade temp of 101.8F last night/swears, resolved with tylenol  No cp or sob.   Will follow up on tomorrow pending COVID test, considering CXR  Strict ED precautions discussed.

## 2022-01-06 ENCOUNTER — TELEPHONE (OUTPATIENT)
Dept: INTERNAL MEDICINE | Facility: CLINIC | Age: 67
End: 2022-01-06
Payer: MEDICARE

## 2022-01-06 NOTE — TELEPHONE ENCOUNTER
----- Message from Blossom Lantigua sent at 1/6/2022  2:17 PM CST -----  Contact: pt- [phone]  Patient is returning a phone call.    Who left a message for the patient: Maci Ward LPN     Does patient know what this is regarding:  ye    Would you like a call back, or a response through your [facility] portal?:  call    Comments:

## 2022-01-06 NOTE — TELEPHONE ENCOUNTER
----- Message from Oksana Siddiqui sent at 1/6/2022  4:10 PM CST -----  Contact: [phone]  Pt states she is asking to speak to the nurse and she forgot to tell you she needs to report back to work on Monday.. please advise and give a return call

## 2022-01-06 NOTE — TELEPHONE ENCOUNTER
----- Message from Joseph Dawson sent at 1/6/2022 11:31 AM CST -----  Contact: [phone]  Chelsie is requesting a callback as soon as possible.  She states that she saw the doctor on Monday and she got a flu test and covid test.  She received the results for the flu test but she would like to be advised on the covid test results.    Callback number: [phone]

## 2022-01-08 LAB
SARS-COV-2 RNA RESP QL NAA+PROBE: NORMAL
TEST PERFORMANCE INFO SPEC: NORMAL

## 2022-02-02 ENCOUNTER — CLINICAL SUPPORT (OUTPATIENT)
Dept: OTHER | Facility: CLINIC | Age: 67
End: 2022-02-02
Payer: MEDICARE

## 2022-02-02 DIAGNOSIS — Z00.8 ENCOUNTER FOR OTHER GENERAL EXAMINATION: ICD-10-CM

## 2022-02-03 VITALS — HEIGHT: 66 IN | BODY MASS INDEX: 26.94 KG/M2

## 2022-02-03 LAB
GLUCOSE SERPL-MCNC: 76 MG/DL (ref 60–140)
HDLC SERPL-MCNC: 39 MG/DL
POC CHOLESTEROL, LDL (DOCK): 113 MG/DL
POC CHOLESTEROL, TOTAL: 193 MG/DL
TRIGL SERPL-MCNC: 205 MG/DL

## 2022-03-16 ENCOUNTER — PATIENT OUTREACH (OUTPATIENT)
Dept: ADMINISTRATIVE | Facility: OTHER | Age: 67
End: 2022-03-16
Payer: MEDICARE

## 2022-03-17 ENCOUNTER — OFFICE VISIT (OUTPATIENT)
Dept: OBSTETRICS AND GYNECOLOGY | Facility: CLINIC | Age: 67
End: 2022-03-17
Payer: MEDICARE

## 2022-03-17 VITALS
WEIGHT: 166.44 LBS | BODY MASS INDEX: 26.75 KG/M2 | DIASTOLIC BLOOD PRESSURE: 72 MMHG | HEIGHT: 66 IN | SYSTOLIC BLOOD PRESSURE: 134 MMHG

## 2022-03-17 DIAGNOSIS — Z12.31 ENCOUNTER FOR SCREENING MAMMOGRAM FOR MALIGNANT NEOPLASM OF BREAST: ICD-10-CM

## 2022-03-17 DIAGNOSIS — Z01.419 WELL WOMAN EXAM WITH ROUTINE GYNECOLOGICAL EXAM: Primary | ICD-10-CM

## 2022-03-17 PROCEDURE — 99999 PR PBB SHADOW E&M-EST. PATIENT-LVL III: ICD-10-PCS | Mod: PBBFAC,,, | Performed by: OBSTETRICS & GYNECOLOGY

## 2022-03-17 PROCEDURE — 3075F SYST BP GE 130 - 139MM HG: CPT | Mod: CPTII,S$GLB,, | Performed by: OBSTETRICS & GYNECOLOGY

## 2022-03-17 PROCEDURE — G0101 PR CA SCREEN;PELVIC/BREAST EXAM: ICD-10-PCS | Mod: S$GLB,,, | Performed by: OBSTETRICS & GYNECOLOGY

## 2022-03-17 PROCEDURE — 3008F BODY MASS INDEX DOCD: CPT | Mod: CPTII,S$GLB,, | Performed by: OBSTETRICS & GYNECOLOGY

## 2022-03-17 PROCEDURE — 1159F MED LIST DOCD IN RCRD: CPT | Mod: CPTII,S$GLB,, | Performed by: OBSTETRICS & GYNECOLOGY

## 2022-03-17 PROCEDURE — 1159F PR MEDICATION LIST DOCUMENTED IN MEDICAL RECORD: ICD-10-PCS | Mod: CPTII,S$GLB,, | Performed by: OBSTETRICS & GYNECOLOGY

## 2022-03-17 PROCEDURE — 1126F PR PAIN SEVERITY QUANTIFIED, NO PAIN PRESENT: ICD-10-PCS | Mod: CPTII,S$GLB,, | Performed by: OBSTETRICS & GYNECOLOGY

## 2022-03-17 PROCEDURE — 1101F PR PT FALLS ASSESS DOC 0-1 FALLS W/OUT INJ PAST YR: ICD-10-PCS | Mod: CPTII,S$GLB,, | Performed by: OBSTETRICS & GYNECOLOGY

## 2022-03-17 PROCEDURE — 1126F AMNT PAIN NOTED NONE PRSNT: CPT | Mod: CPTII,S$GLB,, | Performed by: OBSTETRICS & GYNECOLOGY

## 2022-03-17 PROCEDURE — 3288F FALL RISK ASSESSMENT DOCD: CPT | Mod: CPTII,S$GLB,, | Performed by: OBSTETRICS & GYNECOLOGY

## 2022-03-17 PROCEDURE — 3008F PR BODY MASS INDEX (BMI) DOCUMENTED: ICD-10-PCS | Mod: CPTII,S$GLB,, | Performed by: OBSTETRICS & GYNECOLOGY

## 2022-03-17 PROCEDURE — G0101 CA SCREEN;PELVIC/BREAST EXAM: HCPCS | Mod: S$GLB,,, | Performed by: OBSTETRICS & GYNECOLOGY

## 2022-03-17 PROCEDURE — 3078F DIAST BP <80 MM HG: CPT | Mod: CPTII,S$GLB,, | Performed by: OBSTETRICS & GYNECOLOGY

## 2022-03-17 PROCEDURE — 99999 PR PBB SHADOW E&M-EST. PATIENT-LVL III: CPT | Mod: PBBFAC,,, | Performed by: OBSTETRICS & GYNECOLOGY

## 2022-03-17 PROCEDURE — 3078F PR MOST RECENT DIASTOLIC BLOOD PRESSURE < 80 MM HG: ICD-10-PCS | Mod: CPTII,S$GLB,, | Performed by: OBSTETRICS & GYNECOLOGY

## 2022-03-17 PROCEDURE — 3288F PR FALLS RISK ASSESSMENT DOCUMENTED: ICD-10-PCS | Mod: CPTII,S$GLB,, | Performed by: OBSTETRICS & GYNECOLOGY

## 2022-03-17 PROCEDURE — 1101F PT FALLS ASSESS-DOCD LE1/YR: CPT | Mod: CPTII,S$GLB,, | Performed by: OBSTETRICS & GYNECOLOGY

## 2022-03-17 PROCEDURE — 3075F PR MOST RECENT SYSTOLIC BLOOD PRESS GE 130-139MM HG: ICD-10-PCS | Mod: CPTII,S$GLB,, | Performed by: OBSTETRICS & GYNECOLOGY

## 2022-03-17 NOTE — PROGRESS NOTES
History & Physical  Gynecology      SUBJECTIVE:     Chief Complaint: Well Woman       History of Present Illness:  Annual Exam-Postmenopausal  Patient presents for annual exam. The patient has no complaints today. Patient denies post-menopausal vaginal bleeding. The patient is not currently sexually active. The patient is not taking hormone replacement therapy.  The patient participates in regular exercise: no.  She does not smoke.     GYN screening history: last pap: approximate date  and was normal  Mammogram history: 2021  Colonoscopy history: -- request in for repeat  Dexa history: 2021    FH:   Breast cancer: personal history   Colon cancer: father  Ovarian cancer: negative    Review of patient's allergies indicates:   Allergen Reactions    No known drug allergies        Past Medical History:   Diagnosis Date    Anxiety     Breast cancer 2015    Right breast infiltrating ductal CA, [place]/[facility] negative    Depression     Genetic testing     see clinic notes 5-15 for details, VUS in [facility]    GERD (gastroesophageal reflux disease)     Hyperlipidemia     Hypertension     Hypothyroidism     PONV (postoperative nausea and vomiting)     Thyroid disease     hypothryoidism     Past Surgical History:   Procedure Laterality Date    APPENDECTOMY      BREAST BIOPSY Right 2015    [facility]     SECTION      COLONOSCOPY N/A 2016    Procedure: COLONOSCOPY;  Surgeon: YULIYA Sherman MD;  Location: [facility] ([address] [place] [address]);  Service: Endoscopy;  Laterality: N/A;     OB History        3    Para   2    Term   1            AB   1    Living   1       SAB   1    IAB        Ectopic        Multiple        Live Births   1               Family History   Problem Relation Age of Onset    Hypertension Mother     Hyperlipidemia Mother     Breast cancer Mother 60    Cancer Mother         Basal cell skin CA, lymphoma    Depression Mother     Colon cancer Father     Dementia Father      Heart disease Father         chf    Cancer Father         colon CA and Basal Cell skin CA    Breast cancer Maternal Aunt 50    Cancer Maternal Aunt         Colon CA    Breast cancer Cousin 49        maternal    Hypertension Sister     Diabetes Sister     Hypertension Sister     Bipolar disorder Son     Mental illness Son         bipolar/schizoaffective    Diabetes Son     Cancer Maternal Cousin     Amblyopia Neg Hx     Blindness Neg Hx     Cataracts Neg Hx     Glaucoma Neg Hx     Macular degeneration Neg Hx     Retinal detachment Neg Hx     Strabismus Neg Hx     Stroke Neg Hx     Thyroid disease Neg Hx     Ovarian cancer Neg Hx      Social History     Tobacco Use    Smoking status: Never Smoker    Smokeless tobacco: Never Used   Substance Use Topics    Alcohol use: Yes     Comment: rarely    Drug use: No       Current Outpatient Medications   Medication Sig    atenoloL (TENORMIN) 25 MG tablet Take 1 tablet (25 mg total) by mouth once daily.    levothyroxine (SYNTHROID) 112 MCG tablet TAKE 1 TABLET BY MOUTH ONCE DAILY    LORazepam (ATIVAN) 1 MG tablet TAKE 1/2 TO 1 (ONE-HALF TO ONE) TABLET BY MOUTH IN THE EVENING    pantoprazole (PROTONIX) 40 MG tablet TAKE 1 TABLET BY MOUTH EVERY DAY    pravastatin (PRAVACHOL) 40 MG tablet TAKE ONE TABLET BY MOUTH EVERY DAY    triamterene-hydrochlorothiazide 37.5-25 mg (DYAZIDE) 37.5-25 mg per capsule TAKE 1 CAPSULE BY MOUTH EVERY DAY.     No current facility-administered medications for this visit.       Review of Systems:  Review of Systems   Constitutional: Negative for appetite change, fever and unexpected weight change.   Respiratory: Negative for shortness of breath.    Cardiovascular: Negative for chest pain.   Gastrointestinal: Negative for nausea and vomiting.   Genitourinary: Negative for pelvic pain, vaginal bleeding, vaginal discharge, vaginal pain and postmenopausal bleeding.   Integumentary:  Negative for breast mass.   Breast:  Negative for lump and mass       OBJECTIVE:     Physical Exam:  Physical Exam  Vitals and nursing note reviewed.   Constitutional:       Appearance: She is well-developed.   Neck:      Thyroid: No thyromegaly.      Trachea: No tracheal deviation.   Cardiovascular:      Rate and Rhythm: Normal rate and regular rhythm.      Heart sounds: Normal heart sounds.   Pulmonary:      Effort: Pulmonary effort is normal.      Breath sounds: Normal breath sounds.   Chest:   Breasts: Breasts are symmetrical.      Right: No inverted nipple, mass, nipple discharge, skin change or tenderness.      Left: No inverted nipple, mass, nipple discharge, skin change or tenderness.       Abdominal:      Palpations: Abdomen is soft.   Genitourinary:     General: Normal vulva.      Labia:         Right: No rash, tenderness, lesion or injury.         Left: No rash, tenderness, lesion or injury.       Urethra: No prolapse, urethral pain, urethral swelling or urethral lesion.      Vagina: Normal. No signs of injury and foreign body. No vaginal discharge, erythema, tenderness or bleeding.      Cervix: No cervical motion tenderness, discharge or friability.      Uterus: Not deviated, not enlarged, not fixed and not tender.       Adnexa:         Right: No mass, tenderness or fullness.          Left: No mass, tenderness or fullness.        Rectum: No anal fissure or external hemorrhoid.      Comments: Urethral meatus: normal size, anterior vaginal wall with no prolapse, no lesions  Bladder: no fullness, masses or tenderness  Musculoskeletal:      Cervical back: Normal range of motion and neck supple.   Neurological:      Mental Status: She is alert and oriented to person, place, and time.   Psychiatric:         Behavior: Behavior normal.         Thought Content: Thought content normal.         Judgment: Judgment normal.         Chaperoned by: Kimberlee    ASSESSMENT:       ICD-10-CM ICD-9-CM    1. Well woman exam with routine gynecological exam  Z01.419  V72.31    2. Encounter for screening mammogram for malignant neoplasm of breast  Z12.31 V76.12 Mammo Digital Screening Bilat w/ Nik[facility]          Plan:      Chelsie was seen today for well woman.    Diagnoses and all orders for this visit:    Well woman exam with routine gynecological exam    Encounter for screening mammogram for malignant neoplasm of breast  -     Mammo Digital Screening Bilat w/ Nikolas; Future        Orders Placed This Encounter   Procedures    Mammo Digital Screening Bilat w/ Nikolas       Well Woman:   - Pap smear: no longer needed  - Mammogram: ordered  - Colonoscopy: patient will schedule  - Dexa: up to date  - Immunizations: with pcp  - Labs: with pcp  - Exercise recommended    Follow up in two years for annual, or prn.    Coco Forbes

## 2022-03-17 NOTE — PROGRESS NOTES
Health Maintenance Due   Topic Date Due    TETANUS VACCINE  Never done    Shingles Vaccine (1 of 2) Never done    Colorectal Cancer Screening  05/24/2021    COVID-19 Vaccine (3 - Booster for Pfizer series) 06/19/2021     Updates were requested from care everywhere.  Chart was reviewed for overdue Proactive [facility] Encounters ([facility]IS[facility]) topics (CRS, Breast Cancer Screening, Eye exam)  Health Maintenance has been updated.  LINKS immunization registry triggered.  Immunizations were reconciled.  Case Request Endoscopy: COLONOSCOPYOrdered 10/27/2021

## 2022-05-19 ENCOUNTER — HOSPITAL ENCOUNTER (OUTPATIENT)
Dept: RADIOLOGY | Facility: HOSPITAL | Age: 67
Discharge: HOME | End: 2022-05-19
Attending: OBSTETRICS & GYNECOLOGY
Payer: MEDICARE

## 2022-05-19 DIAGNOSIS — Z12.31 ENCOUNTER FOR SCREENING MAMMOGRAM FOR MALIGNANT NEOPLASM OF BREAST: ICD-10-CM

## 2022-05-19 PROCEDURE — 77063 MAMMO DIGITAL SCREENING BILAT WITH TOMO: ICD-10-PCS | Mod: 26,,, | Performed by: RADIOLOGY

## 2022-05-19 PROCEDURE — 77063 BREAST TOMOSYNTHESIS BI: CPT | Mod: 26,,, | Performed by: RADIOLOGY

## 2022-05-19 PROCEDURE — 77067 SCR MAMMO BI INCL CAD: CPT | Mod: 26,,, | Performed by: RADIOLOGY

## 2022-05-19 PROCEDURE — 77067 MAMMO DIGITAL SCREENING BILAT WITH TOMO: ICD-10-PCS | Mod: 26,,, | Performed by: RADIOLOGY

## 2022-05-19 PROCEDURE — 77063 BREAST TOMOSYNTHESIS BI: CPT | Mod: TC

## 2022-05-23 ENCOUNTER — PATIENT MESSAGE (OUTPATIENT)
Dept: INTERNAL MEDICINE | Facility: CLINIC | Age: 67
End: 2022-05-23
Payer: MEDICARE

## 2022-08-26 ENCOUNTER — PATIENT MESSAGE (OUTPATIENT)
Dept: INTERNAL MEDICINE | Facility: CLINIC | Age: 67
End: 2022-08-26
Payer: MEDICARE

## 2022-08-26 DIAGNOSIS — Z00.00 ANNUAL PHYSICAL EXAM: Primary | ICD-10-CM

## 2022-08-26 DIAGNOSIS — E03.4 HYPOTHYROIDISM DUE TO ACQUIRED ATROPHY OF THYROID: ICD-10-CM

## 2022-08-26 DIAGNOSIS — I10 ESSENTIAL HYPERTENSION: ICD-10-CM

## 2022-08-26 DIAGNOSIS — E78.5 HYPERLIPIDEMIA, UNSPECIFIED HYPERLIPIDEMIA TYPE: ICD-10-CM

## 2022-08-28 ENCOUNTER — TELEPHONE (OUTPATIENT)
Dept: INTERNAL MEDICINE | Facility: CLINIC | Age: 67
End: 2022-08-28
Payer: MEDICARE

## 2022-08-28 NOTE — TELEPHONE ENCOUNTER
----- Message from Guerda Aggarwal sent at 8/25/2022  2:43 PM CDT -----  Contact: [phone] Patient  Caller is requesting an earlier appointment then we can schedule.  Caller is requesting a message be sent to the provider.  If this is for urgent care symptoms, did you offer other providers at this location, providers at other locations, or [facility]? (yes, no, n/a):  yes  If this is for the patients physical, did you offer to schedule next available and put on wait list, or to see NP or PA for their physical?  (yes, no, n/a):  yes  When is the next available appointment with their provider:  12/13/2022  Reason for the appointment:  physical with labs  Patient preference of timeframe to be scheduled:  10/27/22  Would the patient like a call back, or a response through their [facility] portal?:  pt portal   Comments:

## 2022-09-26 ENCOUNTER — LAB VISIT (OUTPATIENT)
Dept: LAB | Facility: HOSPITAL | Age: 67
End: 2022-09-26
Attending: INTERNAL MEDICINE
Payer: MEDICARE

## 2022-09-26 DIAGNOSIS — E78.5 HYPERLIPIDEMIA, UNSPECIFIED HYPERLIPIDEMIA TYPE: ICD-10-CM

## 2022-09-26 DIAGNOSIS — E03.4 HYPOTHYROIDISM DUE TO ACQUIRED ATROPHY OF THYROID: ICD-10-CM

## 2022-09-26 DIAGNOSIS — Z00.00 ANNUAL PHYSICAL EXAM: ICD-10-CM

## 2022-09-26 DIAGNOSIS — I10 ESSENTIAL HYPERTENSION: ICD-10-CM

## 2022-09-26 LAB
ALBUMIN SERPL BCP-MCNC: 3.6 G/DL (ref 3.5–5.2)
ALP SERPL-CCNC: 136 U/L (ref 55–135)
ALT SERPL W/O P-5'-P-CCNC: 32 U/L (ref 10–44)
ANION GAP SERPL CALC-SCNC: 8 MMOL/L (ref 8–16)
AST SERPL-CCNC: 19 U/L (ref 10–40)
BASOPHILS # BLD AUTO: 0.06 K/UL (ref 0–0.2)
BASOPHILS NFR BLD: 0.5 % (ref 0–1.9)
BILIRUB SERPL-MCNC: 0.3 MG/DL (ref 0.1–1)
BUN SERPL-MCNC: 14 MG/DL (ref 8–23)
CALCIUM SERPL-MCNC: 9.7 MG/DL (ref 8.7–10.5)
CHLORIDE SERPL-SCNC: 103 MMOL/L (ref 95–110)
CHOLEST SERPL-MCNC: 196 MG/DL (ref 120–199)
CHOLEST/HDLC SERPL: 4.8 {RATIO} (ref 2–5)
CO2 SERPL-SCNC: 30 MMOL/L (ref 23–29)
CREAT SERPL-MCNC: 0.7 MG/DL (ref 0.5–1.4)
DIFFERENTIAL METHOD: ABNORMAL
EOSINOPHIL # BLD AUTO: 0.4 K/UL (ref 0–0.5)
EOSINOPHIL NFR BLD: 3.3 % (ref 0–8)
ERYTHROCYTE [DISTWIDTH] IN BLOOD BY AUTOMATED COUNT: 14.9 % (ref 11.5–14.5)
EST. GFR  (NO RACE VARIABLE): >60 ML/MIN/1.73 M^2
GLUCOSE SERPL-MCNC: 105 MG/DL (ref 70–110)
HCT VFR BLD AUTO: 39.6 % (ref 37–48.5)
HDLC SERPL-MCNC: 41 MG/DL (ref 40–75)
HDLC SERPL: 20.9 % (ref 20–50)
HGB BLD-MCNC: 12.5 G/DL (ref 12–16)
IMM GRANULOCYTES # BLD AUTO: 0.06 K/UL (ref 0–0.04)
IMM GRANULOCYTES NFR BLD AUTO: 0.5 % (ref 0–0.5)
LDLC SERPL CALC-MCNC: 120 MG/DL (ref 63–159)
LYMPHOCYTES # BLD AUTO: 2.8 K/UL (ref 1–4.8)
LYMPHOCYTES NFR BLD: 25.4 % (ref 18–48)
MCH RBC QN AUTO: 28.1 PG (ref 27–31)
MCHC RBC AUTO-ENTMCNC: 31.6 G/DL (ref 32–36)
MCV RBC AUTO: 89 FL (ref 82–98)
MONOCYTES # BLD AUTO: 0.8 K/UL (ref 0.3–1)
MONOCYTES NFR BLD: 7.4 % (ref 4–15)
NEUTROPHILS # BLD AUTO: 6.9 K/UL (ref 1.8–7.7)
NEUTROPHILS NFR BLD: 62.9 % (ref 38–73)
NONHDLC SERPL-MCNC: 155 MG/DL
NRBC BLD-RTO: 0 /100 WBC
PLATELET # BLD AUTO: 291 K/UL (ref 150–450)
PMV BLD AUTO: 9.9 FL (ref 9.2–12.9)
POTASSIUM SERPL-SCNC: 3.9 MMOL/L (ref 3.5–5.1)
PROT SERPL-MCNC: 7.7 G/DL (ref 6–8.4)
RBC # BLD AUTO: 4.45 M/UL (ref 4–5.4)
SODIUM SERPL-SCNC: 141 MMOL/L (ref 136–145)
TRIGL SERPL-MCNC: 175 MG/DL (ref 30–150)
TSH SERPL DL<=0.005 MIU/L-ACNC: 2.32 UIU/ML (ref 0.4–4)
WBC # BLD AUTO: 10.98 K/UL (ref 3.9–12.7)

## 2022-09-26 PROCEDURE — 80053 COMPREHEN METABOLIC PANEL: CPT | Performed by: INTERNAL MEDICINE

## 2022-09-26 PROCEDURE — 85025 COMPLETE CBC W/AUTO DIFF WBC: CPT | Performed by: INTERNAL MEDICINE

## 2022-09-26 PROCEDURE — 84443 ASSAY THYROID STIM HORMONE: CPT | Performed by: INTERNAL MEDICINE

## 2022-09-26 PROCEDURE — 80061 LIPID PANEL: CPT | Performed by: INTERNAL MEDICINE

## 2022-09-26 PROCEDURE — 36415 COLL VENOUS BLD VENIPUNCTURE: CPT | Performed by: INTERNAL MEDICINE

## 2022-09-27 ENCOUNTER — OFFICE VISIT (OUTPATIENT)
Dept: INTERNAL MEDICINE | Facility: CLINIC | Age: 67
End: 2022-09-27
Payer: MEDICARE

## 2022-09-27 VITALS
DIASTOLIC BLOOD PRESSURE: 68 MMHG | WEIGHT: 170 LBS | HEIGHT: 66 IN | BODY MASS INDEX: 27.32 KG/M2 | OXYGEN SATURATION: 100 % | SYSTOLIC BLOOD PRESSURE: 132 MMHG | HEART RATE: 66 BPM

## 2022-09-27 DIAGNOSIS — K21.9 GASTROESOPHAGEAL REFLUX DISEASE WITHOUT ESOPHAGITIS: ICD-10-CM

## 2022-09-27 DIAGNOSIS — Z00.00 ANNUAL PHYSICAL EXAM: Primary | ICD-10-CM

## 2022-09-27 DIAGNOSIS — R07.89 ATYPICAL CHEST PAIN: ICD-10-CM

## 2022-09-27 DIAGNOSIS — I10 ESSENTIAL HYPERTENSION: ICD-10-CM

## 2022-09-27 DIAGNOSIS — E78.5 HYPERLIPIDEMIA, UNSPECIFIED HYPERLIPIDEMIA TYPE: ICD-10-CM

## 2022-09-27 DIAGNOSIS — Z80.0 FAMILY HISTORY OF COLON CANCER: ICD-10-CM

## 2022-09-27 DIAGNOSIS — I10 PRIMARY HYPERTENSION: ICD-10-CM

## 2022-09-27 DIAGNOSIS — F43.9 SITUATIONAL STRESS: ICD-10-CM

## 2022-09-27 DIAGNOSIS — E03.4 HYPOTHYROIDISM DUE TO ACQUIRED ATROPHY OF THYROID: ICD-10-CM

## 2022-09-27 DIAGNOSIS — Z12.11 COLON CANCER SCREENING: ICD-10-CM

## 2022-09-27 PROBLEM — H43.12 VITREOUS HEMORRHAGE, LEFT: Status: RESOLVED | Noted: 2018-03-26 | Resolved: 2022-09-27

## 2022-09-27 PROCEDURE — 3078F PR MOST RECENT DIASTOLIC BLOOD PRESSURE < 80 MM HG: ICD-10-PCS | Mod: CPTII,S$GLB,, | Performed by: INTERNAL MEDICINE

## 2022-09-27 PROCEDURE — 90677 PCV20 VACCINE IM: CPT | Mod: S$GLB,,, | Performed by: INTERNAL MEDICINE

## 2022-09-27 PROCEDURE — 3075F PR MOST RECENT SYSTOLIC BLOOD PRESS GE 130-139MM HG: ICD-10-PCS | Mod: CPTII,S$GLB,, | Performed by: INTERNAL MEDICINE

## 2022-09-27 PROCEDURE — 3288F FALL RISK ASSESSMENT DOCD: CPT | Mod: CPTII,S$GLB,, | Performed by: INTERNAL MEDICINE

## 2022-09-27 PROCEDURE — 99499 RISK ADDL DX/OHS AUDIT: ICD-10-PCS | Mod: HCNC,S$GLB,, | Performed by: INTERNAL MEDICINE

## 2022-09-27 PROCEDURE — 1159F MED LIST DOCD IN RCRD: CPT | Mod: CPTII,S$GLB,, | Performed by: INTERNAL MEDICINE

## 2022-09-27 PROCEDURE — G0009 ADMIN PNEUMOCOCCAL VACCINE: HCPCS | Mod: S$GLB,,, | Performed by: INTERNAL MEDICINE

## 2022-09-27 PROCEDURE — 99397 PER PM REEVAL EST PAT 65+ YR: CPT | Mod: 25,S$GLB,, | Performed by: INTERNAL MEDICINE

## 2022-09-27 PROCEDURE — G0009 PNEUMOCOCCAL CONJUGATE VACCINE 20-VALENT: ICD-10-PCS | Mod: S$GLB,,, | Performed by: INTERNAL MEDICINE

## 2022-09-27 PROCEDURE — 90677 PNEUMOCOCCAL CONJUGATE VACCINE 20-VALENT: ICD-10-PCS | Mod: S$GLB,,, | Performed by: INTERNAL MEDICINE

## 2022-09-27 PROCEDURE — 3078F DIAST BP <80 MM HG: CPT | Mod: CPTII,S$GLB,, | Performed by: INTERNAL MEDICINE

## 2022-09-27 PROCEDURE — 99999 PR PBB SHADOW E&M-EST. PATIENT-LVL IV: CPT | Mod: PBBFAC,,, | Performed by: INTERNAL MEDICINE

## 2022-09-27 PROCEDURE — 1126F AMNT PAIN NOTED NONE PRSNT: CPT | Mod: CPTII,S$GLB,, | Performed by: INTERNAL MEDICINE

## 2022-09-27 PROCEDURE — 99999 PR PBB SHADOW E&M-EST. PATIENT-LVL IV: ICD-10-PCS | Mod: PBBFAC,,, | Performed by: INTERNAL MEDICINE

## 2022-09-27 PROCEDURE — 1159F PR MEDICATION LIST DOCUMENTED IN MEDICAL RECORD: ICD-10-PCS | Mod: CPTII,S$GLB,, | Performed by: INTERNAL MEDICINE

## 2022-09-27 PROCEDURE — 1101F PT FALLS ASSESS-DOCD LE1/YR: CPT | Mod: CPTII,S$GLB,, | Performed by: INTERNAL MEDICINE

## 2022-09-27 PROCEDURE — 3075F SYST BP GE 130 - 139MM HG: CPT | Mod: CPTII,S$GLB,, | Performed by: INTERNAL MEDICINE

## 2022-09-27 PROCEDURE — 99499 UNLISTED E&M SERVICE: CPT | Mod: HCNC,S$GLB,, | Performed by: INTERNAL MEDICINE

## 2022-09-27 PROCEDURE — 3008F BODY MASS INDEX DOCD: CPT | Mod: CPTII,S$GLB,, | Performed by: INTERNAL MEDICINE

## 2022-09-27 PROCEDURE — 3288F PR FALLS RISK ASSESSMENT DOCUMENTED: ICD-10-PCS | Mod: CPTII,S$GLB,, | Performed by: INTERNAL MEDICINE

## 2022-09-27 PROCEDURE — 1101F PR PT FALLS ASSESS DOC 0-1 FALLS W/OUT INJ PAST YR: ICD-10-PCS | Mod: CPTII,S$GLB,, | Performed by: INTERNAL MEDICINE

## 2022-09-27 PROCEDURE — 99397 PR PREVENTIVE VISIT,EST,65 & OVER: ICD-10-PCS | Mod: 25,S$GLB,, | Performed by: INTERNAL MEDICINE

## 2022-09-27 PROCEDURE — 3008F PR BODY MASS INDEX (BMI) DOCUMENTED: ICD-10-PCS | Mod: CPTII,S$GLB,, | Performed by: INTERNAL MEDICINE

## 2022-09-27 PROCEDURE — 1126F PR PAIN SEVERITY QUANTIFIED, NO PAIN PRESENT: ICD-10-PCS | Mod: CPTII,S$GLB,, | Performed by: INTERNAL MEDICINE

## 2022-09-27 RX ORDER — ATENOLOL 25 MG/1
25 TABLET ORAL DAILY
Qty: 90 TABLET | Refills: 3 | Status: SHIPPED | OUTPATIENT
Start: 2022-09-27 | End: 2023-12-13 | Stop reason: SDUPTHER

## 2022-09-27 RX ORDER — TRIAMTERENE AND HYDROCHLOROTHIAZIDE 37.5; 25 MG/1; MG/1
CAPSULE ORAL
Qty: 90 CAPSULE | Refills: 3 | Status: SHIPPED | OUTPATIENT
Start: 2022-09-27 | End: 2023-10-17 | Stop reason: SDUPTHER

## 2022-09-27 RX ORDER — LEVOTHYROXINE SODIUM 112 UG/1
TABLET ORAL
Qty: 90 TABLET | Refills: 3 | Status: SHIPPED | OUTPATIENT
Start: 2022-09-27 | End: 2023-10-17 | Stop reason: SDUPTHER

## 2022-09-27 RX ORDER — PRAVASTATIN SODIUM 40 MG/1
TABLET ORAL
Qty: 90 TABLET | Refills: 3 | Status: SHIPPED | OUTPATIENT
Start: 2022-09-27 | End: 2023-10-17 | Stop reason: SDUPTHER

## 2022-09-27 NOTE — PROGRESS NOTES
Subjective:       Patient ID: Chelsie Foster is a 66 y.o. female.    Chief Complaint: Annual Exam    HPI  Son incarcerated.  Hospitalized frequently for schizophrenia.      She takes ativan once or twice a week for anxiety and sleep.    She never had stress test.      No exertional CP.       May have CP when drinking coffee, what she describes as indigestion.   She has had similar complaints over last 2 years, at least.  She is taking protonix qod.  She would like to schedule the stress test that was ordered in 2020.    Less energetic, so not exercising.     Worries about her son.        She attempted EAP for counseling, but she was put off by lack of availability..       Her feelings of depression are short lived.    Htn controlled.  Hyperlipidemia  Hypothyroidism.  Concerned since she is taking generic formulation.    No nighttime apnea.  No daytime somnolence.    Ready for colonoscopy  - aspirated with last colonoscopy .  Needs to discuss with provider.      Review of Systems   Constitutional:  Negative for fever and unexpected weight change.   HENT:  Negative for nasal congestion and postnasal drip.    Respiratory:  Negative for chest tightness, shortness of breath and wheezing.    Cardiovascular:  Positive for chest pain. Negative for leg swelling.   Gastrointestinal:  Negative for abdominal pain, anal bleeding, constipation, diarrhea, nausea and vomiting.   Genitourinary:  Negative for dysuria and urgency.   Integumentary:  Negative for rash.   Neurological:  Negative for headaches.   Psychiatric/Behavioral:  Negative for dysphoric mood and sleep disturbance. The patient is nervous/anxious.        Objective:      Physical Exam  Constitutional:       General: She is not in acute distress.     Appearance: She is well-developed.   HENT:      Head: Normocephalic and atraumatic.      Right Ear: External ear normal.      Left Ear: External ear normal.      Nose: Nose normal.   Eyes:      General: No scleral  icterus.        Right eye: No discharge.         Left eye: No discharge.      Conjunctiva/sclera: Conjunctivae normal.      Pupils: Pupils are equal, round, and reactive to light.   Neck:      Thyroid: No thyromegaly.      Vascular: No JVD.   Cardiovascular:      Rate and Rhythm: Normal rate and regular rhythm.      Heart sounds: Normal heart sounds. No murmur heard.    No gallop.   Pulmonary:      Effort: Pulmonary effort is normal. No respiratory distress.      Breath sounds: Normal breath sounds. No wheezing or rales.   Abdominal:      General: Bowel sounds are normal. There is no distension.      Palpations: Abdomen is soft. There is no mass.      Tenderness: There is no abdominal tenderness. There is no guarding or rebound.   Musculoskeletal:         General: No tenderness. Normal range of motion.      Cervical back: Normal range of motion and neck supple.   Lymphadenopathy:      Cervical: No cervical adenopathy.   Skin:     General: Skin is warm and dry.      Findings: No rash.   Neurological:      Mental Status: She is alert and oriented to person, place, and time.      Cranial Nerves: No cranial nerve deficit.      Coordination: Coordination normal.   Psychiatric:         Behavior: Behavior normal.         Thought Content: Thought content normal.         Judgment: Judgment normal.       Results for orders placed or performed in visit on 09/26/22   Lipid Panel   Result Value Ref Range    Cholesterol 196 120 - 199 mg/dL    Triglycerides 175 (H) 30 - 150 mg/dL    HDL 41 40 - 75 mg/dL    LDL Cholesterol 120.0 63.0 - 159.0 mg/dL    HDL/Cholesterol Ratio 20.9 20.0 - 50.0 %    Total Cholesterol/HDL Ratio 4.8 2.0 - 5.0    Non-HDL Cholesterol 155 mg/dL   CBC W/ AUTO DIFFERENTIAL   Result Value Ref Range    WBC 10.98 3.90 - 12.70 K/uL    RBC 4.45 4.00 - 5.40 M/uL    Hemoglobin 12.5 12.0 - 16.0 g/dL    Hematocrit 39.6 37.0 - 48.5 %    MCV 89 82 - 98 fL    MCH 28.1 27.0 - 31.0 pg    MCHC 31.6 (L) 32.0 - 36.0 g/dL    RDW  14.9 (H) 11.5 - 14.5 %    Platelets 291 150 - 450 K/uL    MPV 9.9 9.2 - 12.9 fL    Immature Granulocytes 0.5 0.0 - 0.5 %    Gran # (ANC) 6.9 1.8 - 7.7 K/uL    Immature Grans (Abs) 0.06 (H) 0.00 - 0.04 K/uL    Lymph # 2.8 1.0 - 4.8 K/uL    Mono # 0.8 0.3 - 1.0 K/uL    Eos # 0.4 0.0 - 0.5 K/uL    Baso # 0.06 0.00 - 0.20 K/uL    nRBC 0 0 /100 WBC    Gran % 62.9 38.0 - 73.0 %    Lymph % 25.4 18.0 - 48.0 %    Mono % 7.4 4.0 - 15.0 %    Eosinophil % 3.3 0.0 - 8.0 %    Basophil % 0.5 0.0 - 1.9 %    Differential Method Automated    COMPREHENSIVE METABOLIC PANEL   Result Value Ref Range    Sodium 141 136 - 145 mmol/L    Potassium 3.9 3.5 - 5.1 mmol/L    Chloride 103 95 - 110 mmol/L    CO2 30 (H) 23 - 29 mmol/L    Glucose 105 70 - 110 mg/dL    BUN 14 8 - 23 mg/dL    Creatinine 0.7 0.5 - 1.4 mg/dL    Calcium 9.7 8.7 - 10.5 mg/dL    Total Protein 7.7 6.0 - 8.4 g/dL    Albumin 3.6 3.5 - 5.2 g/dL    Total Bilirubin 0.3 0.1 - 1.0 mg/dL    Alkaline Phosphatase 136 (H) 55 - 135 U/L    AST 19 10 - 40 U/L    ALT 32 10 - 44 U/L    Anion Gap 8 8 - 16 mmol/L    eGFR >60.0 >60 mL/min/1.73 m^2   TSH   Result Value Ref Range    TSH 2.317 0.400 - 4.000 uIU/mL      Assessment:       Problem List Items Addressed This Visit       Hypothyroid    Hypertension    Hyperlipidemia    GERD (gastroesophageal reflux disease)     Other Visit Diagnoses       Annual physical exam    -  Primary    Essential hypertension                Plan:       Chelsie was seen today for annual exam.    Diagnoses and all orders for this visit:    Annual physical exam    Essential hypertension  The following orders have not been finalized:  -     atenoloL (TENORMIN) 25 MG tablet  -     triamterene-hydrochlorothiazide 37.5-25 mg (DYAZIDE) 37.5-25 mg per capsule    Hyperlipidemia, unspecified hyperlipidemia type  The following orders have not been finalized:  -     pravastatin (PRAVACHOL) 40 MG tablet    Hypothyroidism due to acquired atrophy of thyroid  The following orders  have not been finalized:  -     levothyroxine (SYNTHROID) 112 MCG tablet    Gastroesophageal reflux disease without esophagitis    Primary hypertension     Vaccinations reviewed.  Prevnar 20 today.    Exercise regularly.  Weight loss diet reviewed.

## 2022-09-27 NOTE — PATIENT INSTRUCTIONS
You are due for TDAP (tetanus),     Flu,    Covid,    Shingles.    TOday, will give you Prevnar.20 (pneumonia vaccine)

## 2022-10-24 ENCOUNTER — OFFICE VISIT (OUTPATIENT)
Dept: INTERNAL MEDICINE | Facility: CLINIC | Age: 67
End: 2022-10-24
Payer: MEDICARE

## 2022-10-24 VITALS
OXYGEN SATURATION: 97 % | HEART RATE: 68 BPM | SYSTOLIC BLOOD PRESSURE: 135 MMHG | WEIGHT: 169.31 LBS | DIASTOLIC BLOOD PRESSURE: 70 MMHG | BODY MASS INDEX: 27.21 KG/M2 | HEIGHT: 66 IN

## 2022-10-24 DIAGNOSIS — H66.011 NON-RECURRENT ACUTE SUPPURATIVE OTITIS MEDIA OF RIGHT EAR WITH SPONTANEOUS RUPTURE OF TYMPANIC MEMBRANE: Primary | ICD-10-CM

## 2022-10-24 PROCEDURE — 3078F PR MOST RECENT DIASTOLIC BLOOD PRESSURE < 80 MM HG: ICD-10-PCS | Mod: CPTII,S$GLB,, | Performed by: INTERNAL MEDICINE

## 2022-10-24 PROCEDURE — 3288F PR FALLS RISK ASSESSMENT DOCUMENTED: ICD-10-PCS | Mod: CPTII,S$GLB,, | Performed by: INTERNAL MEDICINE

## 2022-10-24 PROCEDURE — 1159F PR MEDICATION LIST DOCUMENTED IN MEDICAL RECORD: ICD-10-PCS | Mod: CPTII,S$GLB,, | Performed by: INTERNAL MEDICINE

## 2022-10-24 PROCEDURE — 99999 PR PBB SHADOW E&M-EST. PATIENT-LVL III: ICD-10-PCS | Mod: PBBFAC,,, | Performed by: INTERNAL MEDICINE

## 2022-10-24 PROCEDURE — 3288F FALL RISK ASSESSMENT DOCD: CPT | Mod: CPTII,S$GLB,, | Performed by: INTERNAL MEDICINE

## 2022-10-24 PROCEDURE — 99999 PR PBB SHADOW E&M-EST. PATIENT-LVL III: CPT | Mod: PBBFAC,,, | Performed by: INTERNAL MEDICINE

## 2022-10-24 PROCEDURE — 99213 OFFICE O/P EST LOW 20 MIN: CPT | Mod: 25,S$GLB,, | Performed by: INTERNAL MEDICINE

## 2022-10-24 PROCEDURE — 3075F SYST BP GE 130 - 139MM HG: CPT | Mod: CPTII,S$GLB,, | Performed by: INTERNAL MEDICINE

## 2022-10-24 PROCEDURE — 3075F PR MOST RECENT SYSTOLIC BLOOD PRESS GE 130-139MM HG: ICD-10-PCS | Mod: CPTII,S$GLB,, | Performed by: INTERNAL MEDICINE

## 2022-10-24 PROCEDURE — 96372 THER/PROPH/DIAG INJ SC/IM: CPT | Mod: S$GLB,,, | Performed by: INTERNAL MEDICINE

## 2022-10-24 PROCEDURE — 1125F PR PAIN SEVERITY QUANTIFIED, PAIN PRESENT: ICD-10-PCS | Mod: CPTII,S$GLB,, | Performed by: INTERNAL MEDICINE

## 2022-10-24 PROCEDURE — 3078F DIAST BP <80 MM HG: CPT | Mod: CPTII,S$GLB,, | Performed by: INTERNAL MEDICINE

## 2022-10-24 PROCEDURE — 1101F PT FALLS ASSESS-DOCD LE1/YR: CPT | Mod: CPTII,S$GLB,, | Performed by: INTERNAL MEDICINE

## 2022-10-24 PROCEDURE — 1159F MED LIST DOCD IN RCRD: CPT | Mod: CPTII,S$GLB,, | Performed by: INTERNAL MEDICINE

## 2022-10-24 PROCEDURE — 1125F AMNT PAIN NOTED PAIN PRSNT: CPT | Mod: CPTII,S$GLB,, | Performed by: INTERNAL MEDICINE

## 2022-10-24 PROCEDURE — 99213 PR OFFICE/OUTPT VISIT, EST, LEVL III, 20-29 MIN: ICD-10-PCS | Mod: 25,S$GLB,, | Performed by: INTERNAL MEDICINE

## 2022-10-24 PROCEDURE — 1101F PR PT FALLS ASSESS DOC 0-1 FALLS W/OUT INJ PAST YR: ICD-10-PCS | Mod: CPTII,S$GLB,, | Performed by: INTERNAL MEDICINE

## 2022-10-24 PROCEDURE — 96372 PR INJECTION,THERAP/PROPH/DIAG2ST, IM OR SUBCUT: ICD-10-PCS | Mod: S$GLB,,, | Performed by: INTERNAL MEDICINE

## 2022-10-24 RX ORDER — CEFTRIAXONE 1 G/1
1 INJECTION, POWDER, FOR SOLUTION INTRAMUSCULAR; INTRAVENOUS
Status: COMPLETED | OUTPATIENT
Start: 2022-10-24 | End: 2022-10-24

## 2022-10-24 RX ORDER — AMOXICILLIN AND CLAVULANATE POTASSIUM 875; 125 MG/1; MG/1
1 TABLET, FILM COATED ORAL EVERY 12 HOURS
Qty: 20 TABLET | Refills: 0 | Status: SHIPPED | OUTPATIENT
Start: 2022-10-24 | End: 2022-11-03

## 2022-10-24 RX ADMIN — CEFTRIAXONE 1 G: 1 INJECTION, POWDER, FOR SOLUTION INTRAMUSCULAR; INTRAVENOUS at 10:10

## 2022-10-24 NOTE — NURSING
Pt arrived to Nurse visit, after obtaining consent, and per orders of Dr. Pineda, and use of two pt identifiers, an injection of 1g of Ceftriaxone was given in her left pper outer gluteus, by Sailaja Edward LPN. Patient instructed to remain in lobby for 15 minutes afterwards, and to report any adverse reaction to me immediately.

## 2022-10-24 NOTE — PROGRESS NOTES
Subjective:       Patient ID: Chelsie Foster is a 67 y.o. female.   Chief Complaint: Sinus Problem    HPI    Mrs. Foster is a 66 yo female who presents due to ear pain.     She states she has been having nasal congestion, runny nose and sinus pressure for about 1.5 week. Also mild cough due to post nasal drip. Initially drainage was clear but then turned green. Also with facial pain. This morning she woke up with severe ear pain prompting her to make an appt.   Denies fevers, chills, SOB or wheezing.       Review of Systems   Constitutional:  Negative for activity change, appetite change and chills.   HENT:  Positive for nasal congestion, ear pain, postnasal drip, rhinorrhea, sinus pressure/congestion and sneezing.    Respiratory:  Negative for cough and shortness of breath.    Cardiovascular:  Negative for chest pain, palpitations and leg swelling.   Gastrointestinal:  Negative for abdominal distention, abdominal pain, constipation, diarrhea, nausea and vomiting.   Genitourinary:  Negative for dysuria and hematuria.   Musculoskeletal:  Negative for arthralgias, back pain and myalgias.   Neurological:  Negative for dizziness and headaches.   Psychiatric/Behavioral:  Negative for agitation. The patient is not nervous/anxious.         Objective:      Physical Exam  Constitutional:       Appearance: Normal appearance.   HENT:      Head: Normocephalic.      Right Ear: Drainage and swelling present. Tympanic membrane is perforated and erythematous.      Nose: Congestion and rhinorrhea present.   Cardiovascular:      Rate and Rhythm: Normal rate and regular rhythm.      Pulses: Normal pulses.      Heart sounds: Normal heart sounds.   Pulmonary:      Effort: Pulmonary effort is normal.      Breath sounds: Normal breath sounds.   Abdominal:      General: Abdomen is flat. Bowel sounds are normal.      Palpations: Abdomen is soft.   Musculoskeletal:         General: Normal range of motion.      Cervical back: Normal range  of motion and neck supple.   Skin:     General: Skin is warm and dry.   Neurological:      General: No focal deficit present.      Mental Status: She is alert and oriented to person, place, and time.   Psychiatric:         Mood and Affect: Mood normal.       Assessment:       Problem List Items Addressed This Visit    None  Visit Diagnoses       Non-recurrent acute suppurative otitis media of right ear with spontaneous rupture of tympanic membrane    -  Primary    Relevant Orders    Ambulatory referral/consult to ENT            Plan:       Chelsie was seen today for sinus problem.    Diagnoses and all orders for this visit:    Non-recurrent acute suppurative otitis media of right ear with spontaneous rupture of tympanic membrane  With perforation on exam.   Rocephin given in clinic.   Start Augmentin    Flu and COVID negative in clinic   Follow up ENT.     Cally Pineda MD

## 2022-11-03 ENCOUNTER — OFFICE VISIT (OUTPATIENT)
Dept: OTOLARYNGOLOGY | Facility: CLINIC | Age: 67
End: 2022-11-03
Payer: MEDICARE

## 2022-11-03 ENCOUNTER — CLINICAL SUPPORT (OUTPATIENT)
Dept: AUDIOLOGY | Facility: CLINIC | Age: 67
End: 2022-11-03
Payer: MEDICARE

## 2022-11-03 VITALS — DIASTOLIC BLOOD PRESSURE: 86 MMHG | SYSTOLIC BLOOD PRESSURE: 122 MMHG | HEART RATE: 68 BPM

## 2022-11-03 DIAGNOSIS — H65.91 OME (OTITIS MEDIA WITH EFFUSION), RIGHT: Primary | ICD-10-CM

## 2022-11-03 DIAGNOSIS — H90.A11 CONDUCTIVE HEARING LOSS OF RIGHT EAR WITH RESTRICTED HEARING OF LEFT EAR: Primary | ICD-10-CM

## 2022-11-03 PROCEDURE — 1126F AMNT PAIN NOTED NONE PRSNT: CPT | Mod: CPTII,S$GLB,, | Performed by: NURSE PRACTITIONER

## 2022-11-03 PROCEDURE — 92557 COMPREHENSIVE HEARING TEST: CPT | Mod: S$GLB,,, | Performed by: AUDIOLOGIST

## 2022-11-03 PROCEDURE — 1101F PR PT FALLS ASSESS DOC 0-1 FALLS W/OUT INJ PAST YR: ICD-10-PCS | Mod: CPTII,S$GLB,, | Performed by: NURSE PRACTITIONER

## 2022-11-03 PROCEDURE — 3074F SYST BP LT 130 MM HG: CPT | Mod: CPTII,S$GLB,, | Performed by: NURSE PRACTITIONER

## 2022-11-03 PROCEDURE — 1101F PT FALLS ASSESS-DOCD LE1/YR: CPT | Mod: CPTII,S$GLB,, | Performed by: NURSE PRACTITIONER

## 2022-11-03 PROCEDURE — 92567 PR TYMPA2METRY: ICD-10-PCS | Mod: S$GLB,,, | Performed by: AUDIOLOGIST

## 2022-11-03 PROCEDURE — 3288F PR FALLS RISK ASSESSMENT DOCUMENTED: ICD-10-PCS | Mod: CPTII,S$GLB,, | Performed by: NURSE PRACTITIONER

## 2022-11-03 PROCEDURE — 99203 OFFICE O/P NEW LOW 30 MIN: CPT | Mod: S$GLB,,, | Performed by: NURSE PRACTITIONER

## 2022-11-03 PROCEDURE — 99999 PR PBB SHADOW E&M-EST. PATIENT-LVL III: ICD-10-PCS | Mod: PBBFAC,,, | Performed by: NURSE PRACTITIONER

## 2022-11-03 PROCEDURE — 1159F PR MEDICATION LIST DOCUMENTED IN MEDICAL RECORD: ICD-10-PCS | Mod: CPTII,S$GLB,, | Performed by: NURSE PRACTITIONER

## 2022-11-03 PROCEDURE — 3079F PR MOST RECENT DIASTOLIC BLOOD PRESSURE 80-89 MM HG: ICD-10-PCS | Mod: CPTII,S$GLB,, | Performed by: NURSE PRACTITIONER

## 2022-11-03 PROCEDURE — 99203 PR OFFICE/OUTPT VISIT, NEW, LEVL III, 30-44 MIN: ICD-10-PCS | Mod: S$GLB,,, | Performed by: NURSE PRACTITIONER

## 2022-11-03 PROCEDURE — 92557 PR COMPREHENSIVE HEARING TEST: ICD-10-PCS | Mod: S$GLB,,, | Performed by: AUDIOLOGIST

## 2022-11-03 PROCEDURE — 3079F DIAST BP 80-89 MM HG: CPT | Mod: CPTII,S$GLB,, | Performed by: NURSE PRACTITIONER

## 2022-11-03 PROCEDURE — 92567 TYMPANOMETRY: CPT | Mod: S$GLB,,, | Performed by: AUDIOLOGIST

## 2022-11-03 PROCEDURE — 3074F PR MOST RECENT SYSTOLIC BLOOD PRESSURE < 130 MM HG: ICD-10-PCS | Mod: CPTII,S$GLB,, | Performed by: NURSE PRACTITIONER

## 2022-11-03 PROCEDURE — 99999 PR PBB SHADOW E&M-EST. PATIENT-LVL III: CPT | Mod: PBBFAC,,, | Performed by: NURSE PRACTITIONER

## 2022-11-03 PROCEDURE — 3288F FALL RISK ASSESSMENT DOCD: CPT | Mod: CPTII,S$GLB,, | Performed by: NURSE PRACTITIONER

## 2022-11-03 PROCEDURE — 1126F PR PAIN SEVERITY QUANTIFIED, NO PAIN PRESENT: ICD-10-PCS | Mod: CPTII,S$GLB,, | Performed by: NURSE PRACTITIONER

## 2022-11-03 PROCEDURE — 1159F MED LIST DOCD IN RCRD: CPT | Mod: CPTII,S$GLB,, | Performed by: NURSE PRACTITIONER

## 2022-11-03 RX ORDER — FLUTICASONE PROPIONATE 50 MCG
1 SPRAY, SUSPENSION (ML) NASAL 2 TIMES DAILY
Qty: 16 G | Refills: 2 | Status: SHIPPED | OUTPATIENT
Start: 2022-11-03

## 2022-11-03 NOTE — PROGRESS NOTES
Subjective:   Chelsie Foster is a 67 y.o. female who was self-referred for hearing loss.    Chelsie Foster presents to clinic for the evaluation of right sided hearing loss.  She noted that about 1 week ago she was suffering with infectious sinus/nasal symptoms when she developed acute ear pain and drainage.  She was evaluated in primary care and was noted to have otitis media with TM rupture.  She was given rocephin and Augmentin.  She notes that her sinonasal symptoms and ear pain and otorrhea (no longer having anY)  have significantly improved, however she still notes decreased hearing and tinnitus (hissing sound) in the right ear.  There are no left ear symptoms.     There is not a family history of hearing loss at a young age.  There is not a prior history of ear surgery.  There is not a prior history of ear infections.  There is not a history of ear trauma.  She admits to a history of significant noise exposure[place] and believes this may have been too loud.  She does not wear hearing aids currently.  She has not had a hearing test recently.      Past Medical History  She has a past medical history of Anxiety, Breast cancer, Depression, Genetic testing, GERD (gastroesophageal reflux disease), Hyperlipidemia, Hypertension, Hypothyroidism, PONV (postoperative nausea and vomiting), and Thyroid disease.    Past Surgical History  She has a past surgical history that includes  section; Appendectomy; Breast biopsy (Right, 2015); and Colonoscopy (N/A, 2016).    Family History  Her family history includes Bipolar disorder in her son; Breast cancer (age of onset: 49) in her cousin; Breast cancer (age of onset: 50) in her maternal aunt; Breast cancer (age of onset: 60) in her mother; Cancer in her father, maternal aunt, maternal cousin, and mother; Colon cancer in her father; Dementia in her father; Depression in her mother; Diabetes in her sister and son; Heart disease in her father;  Hyperlipidemia in her mother; Hypertension in her mother, sister, and sister; Mental illness in her son.    Social History  She reports that she has never smoked. She has never used smokeless tobacco. She reports current alcohol use. She reports that she does not use drugs.    Allergies  She is allergic to no known drug allergies.    Medications  She has a current medication list which includes the following prescription(s): amoxicillin-clavulanate 875-125mg, atenolol, levothyroxine, lorazepam, pantoprazole, pravastatin, and triamterene-hydrochlorothiazide 37.5-25 mg.  Review of Systems     Constitutional: Negative for appetite change, chills, fatigue, fever and unexpected weight loss.      HENT: Positive for ear discharge, ear infection, ear pain and hearing loss.      Eyes:  Negative for change in eyesight, eye drainage, eye itching and photophobia.     Respiratory:  Positive for snoring.      Cardiovascular:  Negative for chest pain, foot swelling, irregular heartbeat and swollen veins.     Gastrointestinal:  Negative for abdominal pain, acid reflux, constipation, diarrhea, heartburn and vomiting.     Genitourinary: Negative for difficulty urinating, sexual problems and frequent urination.     Musc: Negative for aching joints, aching muscles, back pain and neck pain.     Skin: Negative for rash.     Allergy: Negative for food allergies and seasonal allergies.     Endocrine: Negative for cold intolerance and heat intolerance.      Neurological: Negative for dizziness, headaches, light-headedness, seizures and tremors.      Hematologic: Negative for bruises/bleeds easily and swollen glands.      Psychiatric: Negative for decreased concentration, depression, nervous/anxious and sleep disturbance.        Objective:   /86   Pulse 68      Constitutional:   She is oriented to person, place, and time. She appears well-developed and well-nourished. She appears alert. She is cooperative.  Non-toxic appearance. She  does not have a sickly appearance. She does not appear ill. Normal speech.      Head:  Normocephalic and atraumatic. Not macrocephalic and not microcephalic. Head is without raccoon's eyes, without Knox's sign, without abrasion, without laceration, without right periorbital erythema, without left periorbital erythema and without TMJ tenderness.     Ears:    Right Ear: No lacerations. No drainage, swelling or tenderness. No foreign bodies. No mastoid tenderness. Tympanic membrane is not injected, not scarred, not perforated, not erythematous, not retracted and not bulging. A middle ear effusion is present. No hemotympanum.   Left Ear: No lacerations. No drainage, swelling or tenderness. No foreign bodies. No mastoid tenderness. Tympanic membrane is not injected, not scarred, not perforated, not erythematous, not retracted and not bulging.  No middle ear effusion. No hemotympanum.   Ears:      Pulmonary/Chest:   Effort normal.     Psychiatric:   She has a normal mood and affect. Her speech is normal and behavior is normal.     Neurological:   She is alert and oriented to person, place, and time. Coordination and gait normal.   Procedure  None    Data Reviewed  WBC (K/uL)   Date Value   09/26/2022 10.98     Eosinophil % (%)   Date Value   09/26/2022 3.3     Eos # (K/uL)   Date Value   09/26/2022 0.4     Platelets (K/uL)   Date Value   09/26/2022 291     Glucose (mg/dL)   Date Value   09/26/2022 105     No results found for: IGE  Imaging  No pertinent imaging available.    Audiogram      I independently reviewed the tracings of the complete audiometric evaluation performed today.  I reviewed the audiogram with the patient as well.  Pertinent findings include mild to severe conductive hearing loss in the right ear and normal to mild sensorineural hearing loss in the left ear.  Type B with normal ECV in the right ear.  As in the left.   Assessment:     1. OME (otitis media with effusion), right      Plan:     OME  (otitis media with effusion), right  Symptoms and clinical exam with right serous effusion.  No signs of acute infection or inflammation.  We discussed the potential causes and treatments for this problem.  Recommended trial of Flonase 2 SEN BID.  Reviewed the correct administration technique of nasal sprays.   Also instructed to perform nasal insufflation 2-3 daily, but to stop if it causes any pain.    Reassurance provided- normally these symptoms will resolve without any intervention in a few weeks, however if they persist for greater than 3 months, she may be a candidate for tube placement.  At that point, a referral is to an Otologist will be needed.       Conductive hearing loss in the right/ SNHL in the left  Will reassess at follow-up and discuss need for hearing aids if indicated with audiometric testing.     1 month follow-up with audio

## 2022-11-10 ENCOUNTER — OFFICE VISIT (OUTPATIENT)
Dept: DERMATOLOGY | Facility: CLINIC | Age: 67
End: 2022-11-10
Payer: MEDICARE

## 2022-11-10 DIAGNOSIS — D48.5 NEOPLASM OF UNCERTAIN BEHAVIOR OF SKIN: Primary | ICD-10-CM

## 2022-11-10 DIAGNOSIS — D18.01 ANGIOMA OF SKIN: ICD-10-CM

## 2022-11-10 DIAGNOSIS — L82.1 SK (SEBORRHEIC KERATOSIS): ICD-10-CM

## 2022-11-10 DIAGNOSIS — D22.9 MULTIPLE BENIGN NEVI: ICD-10-CM

## 2022-11-10 DIAGNOSIS — Z12.83 SCREENING EXAM FOR SKIN CANCER: ICD-10-CM

## 2022-11-10 DIAGNOSIS — L81.4 LENTIGO: ICD-10-CM

## 2022-11-10 PROCEDURE — 88342 CHG IMMUNOCYTOCHEMISTRY: ICD-10-PCS | Mod: 26,,, | Performed by: PATHOLOGY

## 2022-11-10 PROCEDURE — 88342 IMHCHEM/IMCYTCHM 1ST ANTB: CPT | Mod: 26,,, | Performed by: PATHOLOGY

## 2022-11-10 PROCEDURE — 3288F PR FALLS RISK ASSESSMENT DOCUMENTED: ICD-10-PCS | Mod: CPTII,S$GLB,, | Performed by: DERMATOLOGY

## 2022-11-10 PROCEDURE — 11102 PR TANGENTIAL BIOPSY, SKIN, SINGLE LESION: ICD-10-PCS | Mod: S$GLB,,, | Performed by: DERMATOLOGY

## 2022-11-10 PROCEDURE — 99999 PR PBB SHADOW E&M-EST. PATIENT-LVL III: CPT | Mod: PBBFAC,,, | Performed by: DERMATOLOGY

## 2022-11-10 PROCEDURE — 1101F PR PT FALLS ASSESS DOC 0-1 FALLS W/OUT INJ PAST YR: ICD-10-PCS | Mod: CPTII,S$GLB,, | Performed by: DERMATOLOGY

## 2022-11-10 PROCEDURE — 88342 IMHCHEM/IMCYTCHM 1ST ANTB: CPT | Performed by: PATHOLOGY

## 2022-11-10 PROCEDURE — 99203 PR OFFICE/OUTPT VISIT, NEW, LEVL III, 30-44 MIN: ICD-10-PCS | Mod: 25,S$GLB,, | Performed by: DERMATOLOGY

## 2022-11-10 PROCEDURE — 3288F FALL RISK ASSESSMENT DOCD: CPT | Mod: CPTII,S$GLB,, | Performed by: DERMATOLOGY

## 2022-11-10 PROCEDURE — 1101F PT FALLS ASSESS-DOCD LE1/YR: CPT | Mod: CPTII,S$GLB,, | Performed by: DERMATOLOGY

## 2022-11-10 PROCEDURE — 1159F PR MEDICATION LIST DOCUMENTED IN MEDICAL RECORD: ICD-10-PCS | Mod: CPTII,S$GLB,, | Performed by: DERMATOLOGY

## 2022-11-10 PROCEDURE — 1160F PR REVIEW ALL MEDS BY PRESCRIBER/CLIN PHARMACIST DOCUMENTED: ICD-10-PCS | Mod: CPTII,S$GLB,, | Performed by: DERMATOLOGY

## 2022-11-10 PROCEDURE — 11103 TANGNTL BX SKIN EA SEP/ADDL: CPT | Mod: S$GLB,,, | Performed by: DERMATOLOGY

## 2022-11-10 PROCEDURE — 11102 TANGNTL BX SKIN SINGLE LES: CPT | Mod: S$GLB,,, | Performed by: DERMATOLOGY

## 2022-11-10 PROCEDURE — 1126F AMNT PAIN NOTED NONE PRSNT: CPT | Mod: CPTII,S$GLB,, | Performed by: DERMATOLOGY

## 2022-11-10 PROCEDURE — 88305 TISSUE EXAM BY PATHOLOGIST: ICD-10-PCS | Mod: 26,,, | Performed by: PATHOLOGY

## 2022-11-10 PROCEDURE — 11103 PR TANGENTIAL BIOPSY, SKIN, EA ADDTL LESION: ICD-10-PCS | Mod: S$GLB,,, | Performed by: DERMATOLOGY

## 2022-11-10 PROCEDURE — 1126F PR PAIN SEVERITY QUANTIFIED, NO PAIN PRESENT: ICD-10-PCS | Mod: CPTII,S$GLB,, | Performed by: DERMATOLOGY

## 2022-11-10 PROCEDURE — 99203 OFFICE O/P NEW LOW 30 MIN: CPT | Mod: 25,S$GLB,, | Performed by: DERMATOLOGY

## 2022-11-10 PROCEDURE — 88305 TISSUE EXAM BY PATHOLOGIST: CPT | Mod: 26,,, | Performed by: PATHOLOGY

## 2022-11-10 PROCEDURE — 88305 TISSUE EXAM BY PATHOLOGIST: CPT | Performed by: PATHOLOGY

## 2022-11-10 PROCEDURE — 1159F MED LIST DOCD IN RCRD: CPT | Mod: CPTII,S$GLB,, | Performed by: DERMATOLOGY

## 2022-11-10 PROCEDURE — 1160F RVW MEDS BY RX/DR IN RCRD: CPT | Mod: CPTII,S$GLB,, | Performed by: DERMATOLOGY

## 2022-11-10 PROCEDURE — 99999 PR PBB SHADOW E&M-EST. PATIENT-LVL III: ICD-10-PCS | Mod: PBBFAC,,, | Performed by: DERMATOLOGY

## 2022-11-10 NOTE — PROGRESS NOTES
Subjective:       Patient ID:  Chelsie Foster is a 67 y.o. female who presents for   Chief Complaint   Patient presents with    Skin Check     TBSE     Patient here for Total Body Skin Exam    Last seen by dermatologist: 2002    none - personal history of atypical moles removed  none - personal history of MM   none - family history of MM  yes - childhood blistering sunburns  none - tanning bed use  none - personal history of NMSC          No new concerning moles or lesions    Review of Systems   Skin:  Positive for activity-related sunscreen use. Negative for daily sunscreen use and recent sunburn.   Hematologic/Lymphatic: Does not bruise/bleed easily.      Objective:    Physical Exam   Constitutional: She appears well-developed and well-nourished. No distress.   Neurological: She is alert and oriented to person, place, and time. She is not disoriented.   Psychiatric: She has a normal mood and affect.   Skin:   Areas Examined (abnormalities noted in diagram):   Head / Face Inspection Performed  Neck Inspection Performed  Chest / Axilla Inspection Performed  Abdomen Inspection Performed  Genitals / Buttocks / Groin Inspection Performed  Back Inspection Performed  RUE Inspected  LUE Inspection Performed  RLE Inspected  LLE Inspection Performed  Nails and Digits Inspection Performed                 Diagram Legend     Erythematous scaling macule/papule c/w actinic keratosis       Vascular papule c/w angioma      Pigmented verrucoid papule/plaque c/w seborrheic keratosis      Yellow umbilicated papule c/w sebaceous hyperplasia      Irregularly shaped tan macule c/w lentigo     1-2 mm smooth white papules consistent with Milia      Movable subcutaneous cyst with punctum c/w epidermal inclusion cyst      Subcutaneous movable cyst c/w pilar cyst      Firm pink to brown papule c/w dermatofibroma      Pedunculated fleshy papule(s) c/w skin tag(s)      Evenly pigmented macule c/w junctional nevus     Mildly variegated  pigmented, slightly irregular-bordered macule c/w mildly atypical nevus      Flesh colored to evenly pigmented papule c/w intradermal nevus       Pink pearly papule/plaque c/w basal cell carcinoma      Erythematous hyperkeratotic cursted plaque c/w SCC      Surgical scar with no sign of skin cancer recurrence      Open and closed comedones      Inflammatory papules and pustules      Verrucoid papule consistent consistent with wart     Erythematous eczematous patches and plaques     Dystrophic onycholytic nail with subungual debris c/w onychomycosis     Umbilicated papule    Erythematous-base heme-crusted tan verrucoid plaque consistent with inflamed seborrheic keratosis     Erythematous Silvery Scaling Plaque c/w Psoriasis     See annotation                    Assessment / Plan:      Pathology Orders:       Normal Orders This Visit    Specimen to Pathology, Dermatology     Questions:    Procedure Type: Dermatology and skin neoplasms    Number of Specimens: 2    ------------------------: -------------------------    Spec 1 Procedure: Biopsy    Spec 1 Clinical Impression: r/o bcc vs ak    Spec 1 Source: nasal bridge    ------------------------: -------------------------    Spec 2 Procedure: Biopsy    Spec 2 Clinical Impression: r/o atypical nevus vs other    Spec 2 Source: left post thigh    Release to patient: Immediate          Neoplasm of uncertain behavior of skin  -     Specimen to Pathology, Dermatology  Shave biopsy procedure note:    Shave biopsy performed after verbal consent including risk of infection, scar, recurrence, need for additional treatment of site. Area prepped with alcohol, anesthetized with approximately 1.0cc of 1% lidocaine because of epinephrine shortage. Lesional tissue shaved with razor blade. Hemostasis achieved with application of aluminum chloride followed by hyfrecation and Monsels if needed.  No complications. Dressing applied. Wound care explained.      Angioma of skin  This is a  benign vascular lesion. Reassurance given. No treatment required.       SK (seborrheic keratosis)  These are benign inherited growths without a malignant potential. Reassurance given to patient. No treatment is necessary.       Lentigo  This is a benign hyperpigmented sun induced lesion. Recommend daily sun protection/avoidance and use of at least SPF 30, broad spectrum sunscreen (OTC drug) will reduce the number of new lesions. Treatment of these benign lesions are considered cosmetic.    Multiple benign nevi   - minor problem and chronic.   Reassurance given to patient. No treatment necessary.       Screening exam for skin cancer  Total body skin examination performed today including at least 12 points as noted in physical examination. Suspicious lesions noted.    Recommend daily sun protection/avoidance, use of at least SPF 30, broad spectrum sunscreen (OTC drug), skin self examinations, and routine physician surveillance to optimize early detection               Follow up if symptoms worsen or fail to improve.

## 2022-11-10 NOTE — PATIENT INSTRUCTIONS
Shave Biopsy Wound Care    Your doctor has performed a shave biopsy today.  A band aid and vaseline ointment has been placed over the site.  This should remain in place for NO LONGER THAN 48 hours.  It is fine to remove the bandaid after 24 hours, if the area is no longer bleeding. It is recommended that you keep the area dry (do not wet)) for the first 24 hours.  After 24 hours, wash the area with warm soap and water and apply Vaseline jelly.  Many patients prefer to use Neosporin or Bacitracin ointment.  This is acceptable; however, know that you can develop an allergy to this medication even if you have used it safely for years.  It is important to keep the area moist.  Letting it dry out and get air slows healing time, and will worsen the scar.        If you notice increasing redness, tenderness, pain, or yellow drainage at the biopsy site, please notify your doctor.  These are signs of an infection.    If your biopsy site is bleeding, apply firm pressure for 15 minutes straight.  Repeat for another 15 minutes, if it is still bleeding.   If the surgical site continues to bleed, then please contact your doctor.      For [facility] users:   You will receive your biopsy results in [facility] as soon as they are available. Please be assured that your physician/provider will review your results and will then determine what further treatment, evaluation, or planning is required. You should be contacted by your physician's/provider's office within 5 business days of receiving your results; If not, please reach out to directly. This is one more way [employer] is putting you first.     [facility] [place] [address]/ [phone] FAX/ [email or web address]

## 2022-11-11 ENCOUNTER — LAB VISIT (OUTPATIENT)
Dept: LAB | Facility: HOSPITAL | Age: 67
End: 2022-11-11
Attending: INTERNAL MEDICINE
Payer: MEDICARE

## 2022-11-11 ENCOUNTER — TELEPHONE (OUTPATIENT)
Dept: INTERNAL MEDICINE | Facility: CLINIC | Age: 67
End: 2022-11-11
Payer: MEDICARE

## 2022-11-11 ENCOUNTER — OFFICE VISIT (OUTPATIENT)
Dept: INTERNAL MEDICINE | Facility: CLINIC | Age: 67
End: 2022-11-11
Payer: MEDICARE

## 2022-11-11 DIAGNOSIS — N39.0 URINARY TRACT INFECTION WITHOUT HEMATURIA, SITE UNSPECIFIED: Primary | ICD-10-CM

## 2022-11-11 DIAGNOSIS — N39.0 URINARY TRACT INFECTION WITHOUT HEMATURIA, SITE UNSPECIFIED: ICD-10-CM

## 2022-11-11 LAB
BACTERIA #/AREA URNS AUTO: ABNORMAL /HPF
BILIRUB UR QL STRIP: NEGATIVE
CLARITY UR REFRACT.AUTO: ABNORMAL
COLOR UR AUTO: YELLOW
GLUCOSE UR QL STRIP: NEGATIVE
HGB UR QL STRIP: ABNORMAL
HYALINE CASTS UR QL AUTO: 0 /LPF
KETONES UR QL STRIP: NEGATIVE
LEUKOCYTE ESTERASE UR QL STRIP: ABNORMAL
MICROSCOPIC COMMENT: ABNORMAL
NITRITE UR QL STRIP: POSITIVE
PH UR STRIP: 5 [PH] (ref 5–8)
PROT UR QL STRIP: ABNORMAL
RBC #/AREA URNS AUTO: >100 /HPF (ref 0–4)
SP GR UR STRIP: 1.03 (ref 1–1.03)
SQUAMOUS #/AREA URNS AUTO: 16 /HPF
URN SPEC COLLECT METH UR: ABNORMAL
WBC #/AREA URNS AUTO: >100 /HPF (ref 0–5)
WBC CLUMPS UR QL AUTO: ABNORMAL

## 2022-11-11 PROCEDURE — 99214 PR OFFICE/OUTPT VISIT, EST, LEVL IV, 30-39 MIN: ICD-10-PCS | Mod: 95,,, | Performed by: INTERNAL MEDICINE

## 2022-11-11 PROCEDURE — 87088 URINE BACTERIA CULTURE: CPT | Performed by: INTERNAL MEDICINE

## 2022-11-11 PROCEDURE — 87186 SC STD MICRODIL/AGAR DIL: CPT | Performed by: INTERNAL MEDICINE

## 2022-11-11 PROCEDURE — 87077 CULTURE AEROBIC IDENTIFY: CPT | Performed by: INTERNAL MEDICINE

## 2022-11-11 PROCEDURE — 87086 URINE CULTURE/COLONY COUNT: CPT | Performed by: INTERNAL MEDICINE

## 2022-11-11 PROCEDURE — 81001 URINALYSIS AUTO W/SCOPE: CPT | Performed by: INTERNAL MEDICINE

## 2022-11-11 PROCEDURE — 99214 OFFICE O/P EST MOD 30 MIN: CPT | Mod: 95,,, | Performed by: INTERNAL MEDICINE

## 2022-11-11 RX ORDER — SULFAMETHOXAZOLE AND TRIMETHOPRIM 800; 160 MG/1; MG/1
1 TABLET ORAL 2 TIMES DAILY
Qty: 14 TABLET | Refills: 0 | Status: SHIPPED | OUTPATIENT
Start: 2022-11-11 | End: 2022-11-11 | Stop reason: SDUPTHER

## 2022-11-11 RX ORDER — SULFAMETHOXAZOLE AND TRIMETHOPRIM 800; 160 MG/1; MG/1
1 TABLET ORAL 2 TIMES DAILY
Qty: 10 TABLET | Refills: 0 | Status: SHIPPED | OUTPATIENT
Start: 2022-11-11 | End: 2022-11-15

## 2022-11-11 NOTE — PROGRESS NOTES
[facility] Virtual Visit Note    The patient location is: [place]  The chief complaint leading to consultation is: UTI  Visit type: Virtual visit with synchronous audio and video  Total time spent with patient: 15 min  Each patient to whom he or she provides medical services by telemedicine is:  (1) informed of the relationship between the physician and patient and the respective role of any other health care provider with respect to management of the patient; and (2) notified that he or she may decline to receive medical services by telemedicine and may withdraw from such care at any time.      Chief Complaint    No chief complaint on file.      History of Present Illness      Chelsie Foster is a 67 y.o. female with chronic conditions of HTN, HLD, hypothyroidism, GERD who presents today for: dysuria and malaise.  Has never had UTI previously.  Denies fevers, chills.    Past Medical History:  Past Medical History:   Diagnosis Date    Anxiety     Breast cancer 2015    Right breast infiltrating ductal CA, ER/ID/Her2 negative    Depression     Genetic testing     see clinic notes -15 for details, VUS in [facility]    GERD (gastroesophageal reflux disease)     Hyperlipidemia     Hypertension     Hypothyroidism     PONV (postoperative nausea and vomiting)     Thyroid disease     hypothryoidism       Past Surgical History:   has a past surgical history that includes  section; Appendectomy; Breast biopsy (Right, 2015); and Colonoscopy (N/A, 2016).    Family History:  family history includes Bipolar disorder in her son; Breast cancer (age of onset: 49) in her cousin; Breast cancer (age of onset: 50) in her maternal aunt; Breast cancer (age of onset: 60) in her mother; Cancer in her father, maternal aunt, maternal cousin, and mother; Colon cancer in her father; Dementia in her father; Depression in her mother; Diabetes in her sister and son; Heart disease in her father; Hyperlipidemia in her  mother; Hypertension in her mother, sister, and sister; Mental illness in her son.     Social History:  Social History     Tobacco Use    Smoking status: Never    Smokeless tobacco: Never   Substance Use Topics    Alcohol use: Yes     Comment: rarely    Drug use: No       Review of Systems   Constitutional:  Negative for chills and weight loss.   Gastrointestinal:  Negative for constipation, nausea and vomiting.   Genitourinary:  Positive for dysuria, frequency and urgency. Negative for flank pain and hematuria.   Skin:  Negative for rash.      Medications:  Outpatient Encounter Medications as of 11/11/2022   Medication Sig Dispense Refill    atenoloL (TENORMIN) 25 MG tablet Take 1 tablet (25 mg total) by mouth once daily. 90 tablet 3    fluticasone propionate (FLONASE) 50 mcg/actuation nasal spray 1 spray (50 mcg total) by Each Nostril route 2 (two) times a day. 16 g 2    levothyroxine (SYNTHROID) 112 MCG tablet TAKE 1 TABLET BY MOUTH ONCE DAILY 90 tablet 3    LORazepam (ATIVAN) 1 MG tablet TAKE 1/2 TO 1 (ONE-HALF TO ONE) TABLET BY MOUTH IN THE EVENING 30 tablet 2    pantoprazole (PROTONIX) 40 MG tablet TAKE 1 TABLET BY MOUTH EVERY DAY 90 tablet 3    pravastatin (PRAVACHOL) 40 MG tablet TAKE ONE TABLET BY MOUTH EVERY DAY 90 tablet 3    sulfamethoxazole-trimethoprim 800-160mg (BACTRIM DS) 800-160 mg Tab Take 1 tablet by mouth 2 (two) times daily or 7 days 10 tablet 0    triamterene-hydrochlorothiazide 37.5-25 mg (DYAZIDE) 37.5-25 mg per capsule TAKE 1 CAPSULE BY MOUTH EVERY DAY. 90 capsule 3    [DISCONTINUED] sulfamethoxazole-trimethoprim 800-160mg (BACTRIM DS) 800-160 mg Tab Take 1 tablet by mouth 2 (two) times daily or 7 days 14 tablet 0     No facility-administered encounter medications on file as of 11/11/2022.       Allergies:  Review of patient's allergies indicates:   Allergen Reactions    No known drug allergies        Health Maintenance:  Immunization History   Administered Date(s) Administered    COVID-19,  MRNA, LN-S, PF (Pfizer) (Purple Cap) 12/29/2020, 01/19/2021    Influenza 11/05/2018    Influenza (FLUAD) - Quadrivalent - Adjuvanted - PF *Preferred* (65+) 10/19/2021    Influenza - Quadrivalent - PF *Preferred* (6 months and older) 10/11/2017, 10/26/2018, 10/07/2020    Influenza A (H1N1) 2009 Monovalent - IM 11/13/2009    Pneumococcal Conjugate - 20 Valent 09/27/2022      Health Maintenance   Topic Date Due    TETANUS VACCINE  Never done    DEXA Scan  02/18/2023    Mammogram  05/19/2023    Lipid Panel  09/26/2027    Hepatitis C Screening  Completed        Physical Exam       ]    Physical Exam  Constitutional:       General: She is not in acute distress.     Appearance: She is well-developed. She is not diaphoretic.   Eyes:      General: No scleral icterus.        Right eye: No discharge.         Left eye: No discharge.      Conjunctiva/sclera: Conjunctivae normal.   Pulmonary:      Effort: Pulmonary effort is normal. No respiratory distress.   Neurological:      Mental Status: She is alert and oriented to person, place, and time.   Psychiatric:         Mood and Affect: Mood normal.         Behavior: Behavior normal.         Thought Content: Thought content normal.         Judgment: Judgment normal.        Laboratory:  CBC:  Recent Labs   Lab 10/22/20  0820 10/20/21  0815 09/26/22  0822   WBC 6.81 7.36 10.98   RBC 4.49 4.56 4.45   Hemoglobin 12.6 12.9 12.5   Hematocrit 40.0 40.1 39.6   Platelets 274 264 291   MCV 89 88 89   MCH 28.1 28.3 28.1   MCHC 31.5 L 32.2 31.6 L     CMP:  Recent Labs   Lab 10/22/20  0820 10/20/21  0815 09/26/22  0822   Glucose 102 105 105   Calcium 9.6 10.0 9.7   Albumin 3.5 3.7 3.6   Total Protein 7.6 7.8 7.7   Sodium 140 140 141   Potassium 3.9 4.0 3.9   CO2 32 H 30 H 30 H   Chloride 101 100 103   BUN 18 16 14   Alkaline Phosphatase 95 91 136 H   ALT 18 19 32   AST 23 19 19   Total Bilirubin 0.3 0.3 0.3     URINALYSIS:       LIPIDS:  Recent Labs   Lab 10/22/20  0820 10/20/21  0815  09/26/22  0822   TSH 1.192 1.183 2.317   HDL 45 47 41   Cholesterol 192 210 H 196   Triglycerides 195 H 171 H 175 H   LDL Cholesterol 108.0 128.8 120.0   HDL/Cholesterol Ratio 23.4 22.4 20.9   Non-HDL Cholesterol 147 163 155   Total Cholesterol/HDL Ratio 4.3 4.5 4.8     TSH:  Recent Labs   Lab 10/22/20  0820 10/20/21  0815 09/26/22  0822   TSH 1.192 1.183 2.317     A1C:        Assessment/Plan     Chelsie Foster is a 67 y.o.female pt of Dr. Carrillo with:    1. Urinary tract infection without hematuria, site unspecified  - sulfamethoxazole-trimethoprim 800-160mg (BACTRIM DS) 800-160 mg Tab; Take 1 tablet by mouth 2 (two) times daily or 7 days  Dispense: 10 tablet; Refill: 0  - Urinalysis, Reflex to Urine Culture Urine, Clean Catch; Future   Pt will submit urine specimen today and start bactrim.  Will follow up results when available to confirm sensitivity.    Future Appointments   Date Time Provider Department Center   12/7/2022  9:00 AM Hillary Cha NP [facility] ENT [place]   1/18/2023  3:15 PM Mat Ortiz OD [facility] OPTOMTY Jake Ayala MD  [facility]

## 2022-11-11 NOTE — TELEPHONE ENCOUNTER
----- Message from Carmen Ellington sent at 11/11/2022 10:10 AM CST -----  Contact: self [phone]  Pt requesting a call in regards to uti requesting urine order.    Please call and advise

## 2022-11-11 NOTE — TELEPHONE ENCOUNTER
----- Message from Carmen Ellington sent at 11/11/2022  8:58 AM CST -----  Contact: self [phone] [phone] ext [phone]  Pt requesting a call stated at work with uti pain and need advice.    Please call and advise

## 2022-11-11 NOTE — TELEPHONE ENCOUNTER
----- Message from Heidi Edgeo sent at 11/11/2022 10:47 AM CST -----  Contact: [phone] opt [phone]  Patient is returning a phone call.  Who left a message for the patient: Brandi  Does patient know what this is regarding:  missed call re symptoms she is having today. Pt states she has pressure when urinating, urgency, and pain, especially at the end. She did not understand why she had to leave specific symptoms and cannot just say it's a UTI and get a u/a ordered without a call back re this, even though said she has not had a UTI before. Started yesterday. Pt says this is dangerous to have and she does not understand why a u/a cannot be ordered immediatly. She knows she has a UTI. Please call pt. She states she left message at 830 and still has not heard back from anyone. But then she states voicemail was left on her phone. Please call her work phone.     Pt states this is very painful. Please call her quickly. Pt states her doctor never makes her wait around all day and she needs this handled right now. I scheduled her a virtual with Dr Ayala for 1 so she could at least get treatment by 1 if she does not hear from Dr Mai. She states she cannot believe she has to receive treatment this way. She wants to make sure that just because she has this appt at 1 with Dr Ayala that will not stop Dr Rich from just ordering her U/A without an appointment. Pt keeps stating she needs a call back from Dr Carrillo's nurse immediatly. Pt is currently at work at the [employer] and left her desk number for you to call so she can have her u/a done at the lab and states she is waiting to be able to go home.     Would you like a call back, or a response through your [facility] portal?:   no  Comments:       [facility]  [address]  [facility] [address]  Phone: [phone] Fax: [phone]

## 2022-11-11 NOTE — TELEPHONE ENCOUNTER
----- Message from Carmen Ellington sent at 11/11/2022 10:10 AM CST -----  Contact: self [phone]  Pt requesting a call in regards to uti requesting urine order.    Please call and advise

## 2022-11-14 LAB — BACTERIA UR CULT: ABNORMAL

## 2022-11-15 DIAGNOSIS — N39.0 URINARY TRACT INFECTION WITHOUT HEMATURIA, SITE UNSPECIFIED: Primary | ICD-10-CM

## 2022-11-15 RX ORDER — CIPROFLOXACIN 250 MG/1
250 TABLET, FILM COATED ORAL 2 TIMES DAILY
Qty: 10 TABLET | Refills: 0 | Status: SHIPPED | OUTPATIENT
Start: 2022-11-15 | End: 2023-10-17

## 2022-11-15 NOTE — PROGRESS NOTES
Urine culture shows bacteria causing UTI is resistant to bactrim.  Sending in cipro to start instead

## 2022-11-18 LAB
FINAL PATHOLOGIC DIAGNOSIS: NORMAL
GROSS: NORMAL
Lab: NORMAL
MICROSCOPIC EXAM: NORMAL

## 2022-11-18 NOTE — PROGRESS NOTES
1. Skin, nasal bridge, shave biopsy:   -BASAL CELL CARCINOMA, NODULAR AND SUPERFICIAL TYPE, EXTENDING TO THE   PERIPHERAL AND DEEP BIOPSY EDGES     2. 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 Appears completely removed with biopsy. No further treatment needed at this time. Follow up if pigment recurs in area.      Recommend patient to Dr. Patel for Mohs surgery consultation x 1 nasal bridge. Picture in chart.     General derm team: Sent patient for Mohs surgery. F/u with me in 6 months.

## 2022-11-23 RX ORDER — LORAZEPAM 1 MG/1
TABLET ORAL
Qty: 30 TABLET | Refills: 0 | Status: SHIPPED | OUTPATIENT
Start: 2022-11-23 | End: 2023-05-15 | Stop reason: SDUPTHER

## 2022-11-23 NOTE — TELEPHONE ENCOUNTER
No new care gaps identified.  [facility] Embedded Care Gaps. Reference number: [id number]. 11/23/2022   11:39:54 AM CST

## 2022-12-07 ENCOUNTER — OFFICE VISIT (OUTPATIENT)
Dept: OTOLARYNGOLOGY | Facility: CLINIC | Age: 67
End: 2022-12-07
Payer: MEDICARE

## 2022-12-07 ENCOUNTER — CLINICAL SUPPORT (OUTPATIENT)
Dept: AUDIOLOGY | Facility: CLINIC | Age: 67
End: 2022-12-07
Payer: MEDICARE

## 2022-12-07 VITALS — SYSTOLIC BLOOD PRESSURE: 137 MMHG | HEART RATE: 73 BPM | DIASTOLIC BLOOD PRESSURE: 89 MMHG

## 2022-12-07 DIAGNOSIS — H93.11 TINNITUS, SUBJECTIVE, RIGHT: ICD-10-CM

## 2022-12-07 DIAGNOSIS — H65.91 OME (OTITIS MEDIA WITH EFFUSION), RIGHT: Primary | ICD-10-CM

## 2022-12-07 DIAGNOSIS — H90.3 SENSORINEURAL HEARING LOSS (SNHL), BILATERAL: Primary | ICD-10-CM

## 2022-12-07 DIAGNOSIS — H90.3 SENSORINEURAL HEARING LOSS (SNHL) OF BOTH EARS: ICD-10-CM

## 2022-12-07 PROCEDURE — 1159F PR MEDICATION LIST DOCUMENTED IN MEDICAL RECORD: ICD-10-PCS | Mod: CPTII,S$GLB,, | Performed by: NURSE PRACTITIONER

## 2022-12-07 PROCEDURE — 99999 PR PBB SHADOW E&M-EST. PATIENT-LVL III: ICD-10-PCS | Mod: PBBFAC,,, | Performed by: NURSE PRACTITIONER

## 2022-12-07 PROCEDURE — 1101F PT FALLS ASSESS-DOCD LE1/YR: CPT | Mod: CPTII,S$GLB,, | Performed by: NURSE PRACTITIONER

## 2022-12-07 PROCEDURE — 99213 OFFICE O/P EST LOW 20 MIN: CPT | Mod: S$GLB,,, | Performed by: NURSE PRACTITIONER

## 2022-12-07 PROCEDURE — 92567 PR TYMPA2METRY: ICD-10-PCS | Mod: S$GLB,,,

## 2022-12-07 PROCEDURE — 92557 PR COMPREHENSIVE HEARING TEST: ICD-10-PCS | Mod: S$GLB,,,

## 2022-12-07 PROCEDURE — 99213 PR OFFICE/OUTPT VISIT, EST, LEVL III, 20-29 MIN: ICD-10-PCS | Mod: S$GLB,,, | Performed by: NURSE PRACTITIONER

## 2022-12-07 PROCEDURE — 92567 TYMPANOMETRY: CPT | Mod: S$GLB,,,

## 2022-12-07 PROCEDURE — 1101F PR PT FALLS ASSESS DOC 0-1 FALLS W/OUT INJ PAST YR: ICD-10-PCS | Mod: CPTII,S$GLB,, | Performed by: NURSE PRACTITIONER

## 2022-12-07 PROCEDURE — 3075F SYST BP GE 130 - 139MM HG: CPT | Mod: CPTII,S$GLB,, | Performed by: NURSE PRACTITIONER

## 2022-12-07 PROCEDURE — 1126F PR PAIN SEVERITY QUANTIFIED, NO PAIN PRESENT: ICD-10-PCS | Mod: CPTII,S$GLB,, | Performed by: NURSE PRACTITIONER

## 2022-12-07 PROCEDURE — 3288F PR FALLS RISK ASSESSMENT DOCUMENTED: ICD-10-PCS | Mod: CPTII,S$GLB,, | Performed by: NURSE PRACTITIONER

## 2022-12-07 PROCEDURE — 3079F PR MOST RECENT DIASTOLIC BLOOD PRESSURE 80-89 MM HG: ICD-10-PCS | Mod: CPTII,S$GLB,, | Performed by: NURSE PRACTITIONER

## 2022-12-07 PROCEDURE — 99999 PR PBB SHADOW E&M-EST. PATIENT-LVL III: CPT | Mod: PBBFAC,,, | Performed by: NURSE PRACTITIONER

## 2022-12-07 PROCEDURE — 3288F FALL RISK ASSESSMENT DOCD: CPT | Mod: CPTII,S$GLB,, | Performed by: NURSE PRACTITIONER

## 2022-12-07 PROCEDURE — 92557 COMPREHENSIVE HEARING TEST: CPT | Mod: S$GLB,,,

## 2022-12-07 PROCEDURE — 1159F MED LIST DOCD IN RCRD: CPT | Mod: CPTII,S$GLB,, | Performed by: NURSE PRACTITIONER

## 2022-12-07 PROCEDURE — 1126F AMNT PAIN NOTED NONE PRSNT: CPT | Mod: CPTII,S$GLB,, | Performed by: NURSE PRACTITIONER

## 2022-12-07 PROCEDURE — 3079F DIAST BP 80-89 MM HG: CPT | Mod: CPTII,S$GLB,, | Performed by: NURSE PRACTITIONER

## 2022-12-07 PROCEDURE — 3075F PR MOST RECENT SYSTOLIC BLOOD PRESS GE 130-139MM HG: ICD-10-PCS | Mod: CPTII,S$GLB,, | Performed by: NURSE PRACTITIONER

## 2022-12-07 NOTE — PROGRESS NOTES
Chelsie Foster was seen today in the clinic for an audiologic evaluation.  Patient's main complaint was difficulty hearing in her right ear.  Ms. Foster reported a history of an ear infection in her right ear and experienced otalgia that has since subsided. Ms. Foster reported since beginning the nasal spray at her last visit, noting improvement. Patient reported occasional roaring in the right ear.    Tympanometry revealed Type As in the right ear and Type As in the left ear.     Audiogram results revealed a mild low frequency sensorineural hearing loss (SNHL) in the right ear and a mild to moderate low frequency SNHL in the left ear.      Speech reception thresholds were noted at 15 dBHL in the right ear and 15 dBHL in the left ear.    Speech discrimination scores were 100% in the right ear and 100% in the left ear.    Recommendations:  Otologic evaluation  Consider hearing aid consultation if communication difficulty perceived  Annual audiogram or sooner if change perceived  Hearing protection in noise

## 2022-12-07 NOTE — PROGRESS NOTES
Subjective:   Chelsie is a 67 y.o. female who comes for follow-up.    Today:  Chelsie Foster presents for 1 month follow-up for right OME following a URI.  She reports her right ear symptoms have resolved after using the Flonase.  She denies any otalgia, otorrhea, ear fullness/pressure, tinnitus or vertigo.     Note from previous visit on 11/3/2022:  Chelsie Foster presents to clinic for the evaluation of right sided hearing loss.  She noted that about 1 week ago she was suffering with infectious sinus/nasal symptoms when she developed acute ear pain and drainage.  She was evaluated in primary care and was noted to have otitis media with TM rupture.  She was given rocephin and Augmentin.  She notes that her sinonasal symptoms and ear pain and otorrhea (no longer having any)  have significantly improved, however she still notes decreased hearing and tinnitus (hissing sound) in the right ear.  There are no left ear symptoms. There is not a family history of hearing loss at a young age.  There is not a prior history of ear surgery.  There is not a prior history of ear infections.  There is not a history of ear trauma.  She admits to a history of significant noise exposure[place] and believes this may have been too loud.  She does not wear hearing aids currently.  She has not had a hearing test recently.    OME (otitis media with effusion), right  Symptoms and clinical exam with right serous effusion.  No signs of acute infection or inflammation.  We discussed the potential causes and treatments for this problem.  Recommended trial of Flonase 2 SEN BID.  Reviewed the correct administration technique of nasal sprays.   Also instructed to perform nasal insufflation 2-3 daily, but to stop if it causes any pain.    Reassurance provided- normally these symptoms will resolve without any intervention in a few weeks, however if they persist for greater than 3 months, she may be a candidate for tube placement.   At that point, a referral is to an Otologist will be needed.       Conductive hearing loss in the right/ SNHL in the left  Will reassess at follow-up and discuss need for hearing aids if indicated with audiometric testing.     1 month follow-up with audio    The patient's medications, allergies, past medical, surgical, social and family histories were reviewed and updated as appropriate.    A detailed review of systems was obtained with pertinent positives as per the above HPI, and otherwise negative.   Objective:   /89   Pulse 73    Physical Exam  Procedure  None    Data Reviewed  WBC (K/uL)   Date Value   09/26/2022 10.98     Eosinophil % (%)   Date Value   09/26/2022 3.3     Eos # (K/uL)   Date Value   09/26/2022 0.4     Platelets (K/uL)   Date Value   09/26/2022 291     Glucose (mg/dL)   Date Value   09/26/2022 105     No results found for: IGE  Audiogram              I independently reviewed the tracings of the complete audiometric evaluation performed today.  I reviewed the audiogram with the patient as well.  Pertinent findings include mild low frequency sensorineural hearing loss (SNHL) in the right ear and a mild to moderate low frequency SNHL in the left ear.      Assessment:     1. OME (otitis media with effusion), right    2. Sensorineural hearing loss (SNHL) of both ears      Plan:   I had a long discussion with the patient regarding her condition and the further workup and management options.     OME (otitis media with effusion), right  Resolved.  No effusion on exam and symptoms have improved with topical nasal steroid.  Can D/C Flonase at this time.      Sensorineural hearing loss (SNHL) of both ears  Audiometric testing interpretation consistent with sensorineural hearing loss.  Discussed the etiology of SNHL.  Hearing conservation in noisy environments.  Return to clinic every year for audiometric testing.

## 2023-01-18 ENCOUNTER — OFFICE VISIT (OUTPATIENT)
Dept: OPTOMETRY | Facility: CLINIC | Age: 68
End: 2023-01-18
Payer: MEDICARE

## 2023-01-18 DIAGNOSIS — Z01.00 EXAMINATION OF EYES AND VISION: Primary | ICD-10-CM

## 2023-01-18 DIAGNOSIS — Z13.5 SCREENING FOR EYE CONDITION: ICD-10-CM

## 2023-01-18 DIAGNOSIS — H43.393 VITREOUS FLOATERS OF BOTH EYES: ICD-10-CM

## 2023-01-18 DIAGNOSIS — H25.13 NUCLEAR SCLEROSIS OF BOTH EYES: ICD-10-CM

## 2023-01-18 PROCEDURE — 1159F MED LIST DOCD IN RCRD: CPT | Mod: HCNC,CPTII,S$GLB, | Performed by: OPTOMETRIST

## 2023-01-18 PROCEDURE — 1101F PR PT FALLS ASSESS DOC 0-1 FALLS W/OUT INJ PAST YR: ICD-10-PCS | Mod: HCNC,CPTII,S$GLB, | Performed by: OPTOMETRIST

## 2023-01-18 PROCEDURE — 1126F PR PAIN SEVERITY QUANTIFIED, NO PAIN PRESENT: ICD-10-PCS | Mod: HCNC,CPTII,S$GLB, | Performed by: OPTOMETRIST

## 2023-01-18 PROCEDURE — 1126F AMNT PAIN NOTED NONE PRSNT: CPT | Mod: HCNC,CPTII,S$GLB, | Performed by: OPTOMETRIST

## 2023-01-18 PROCEDURE — 99999 PR PBB SHADOW E&M-EST. PATIENT-LVL III: CPT | Mod: PBBFAC,HCNC,, | Performed by: OPTOMETRIST

## 2023-01-18 PROCEDURE — 1159F PR MEDICATION LIST DOCUMENTED IN MEDICAL RECORD: ICD-10-PCS | Mod: HCNC,CPTII,S$GLB, | Performed by: OPTOMETRIST

## 2023-01-18 PROCEDURE — 1101F PT FALLS ASSESS-DOCD LE1/YR: CPT | Mod: HCNC,CPTII,S$GLB, | Performed by: OPTOMETRIST

## 2023-01-18 PROCEDURE — 99999 PR PBB SHADOW E&M-EST. PATIENT-LVL III: ICD-10-PCS | Mod: PBBFAC,HCNC,, | Performed by: OPTOMETRIST

## 2023-01-18 PROCEDURE — 3288F PR FALLS RISK ASSESSMENT DOCUMENTED: ICD-10-PCS | Mod: HCNC,CPTII,S$GLB, | Performed by: OPTOMETRIST

## 2023-01-18 PROCEDURE — 92014 PR EYE EXAM, EST PATIENT,COMPREHESV: ICD-10-PCS | Mod: HCNC,S$GLB,, | Performed by: OPTOMETRIST

## 2023-01-18 PROCEDURE — 3288F FALL RISK ASSESSMENT DOCD: CPT | Mod: HCNC,CPTII,S$GLB, | Performed by: OPTOMETRIST

## 2023-01-18 PROCEDURE — 92014 COMPRE OPH EXAM EST PT 1/>: CPT | Mod: HCNC,S$GLB,, | Performed by: OPTOMETRIST

## 2023-01-19 NOTE — PATIENT INSTRUCTIONS
Bilateral early nuclear sclerosis of lens, and bilateral minimal posterior subcapsular cataract.    Bilateral vitreous floaters, without evidence of retinal etiology.    Otherwise, ocular health appears good in both eyes.    Prior diagnosis of bilateral compound myopic astigmatism and presbyopia.  Wears corneal RGP contact lenses with distance correction in both eyes.   Uses reading glasses over contact lenses.    Contact lens fit appears satisfactory in both eyes.  Showing need for power change in each contact lenses.  However contact lenses appear to be switched.  Placed lenses into correct eyes.  Wear as such for a few days. Send report on satisfaction with [facility] with lenses worn as such.    Additionally, return within a few days in the early morning for refraction for new spectacle lens Rx to be worn in lieu of contact lenses.   Leave CLs out entirely that day prior to that visit

## 2023-01-19 NOTE — PROGRESS NOTES
HPI     eye exam            Comments: Annual general eye exam and contact lens.  Wears corneal RGP CLs in both eyes.  No acute ocular/vision problems.  No problems with CLs, as long as she   keeps lenses clear.   Uses reading glasses over CLs.           Comments    Patient's age: 67 y.o. WF  Occupation: [employer] employee  Approximate date of last eye examination:  8/18/2021  Name of last eye doctor seen:    City/State: [facility]  Wears glasses? yes     If yes, wears  Full-time or part-time?  Part-time  Present glasses are: Bifocal, SV Distance, SV Reading?  Single-vision   Approximate age of present glasses:  15 years    Got new glasses following last exam, or subsequently?:  no   Any problem with [facility] with glasses?  yes  Wears CLs?:  yes           If yes:              Type of CL worn:  gas-permeable corneal RGP contact lenses.               Wears full-time or part-time:  full-time               Sleeps with contact lenses:  no               CL Solution used:  [place] [employer]               How often replace CLs:  as needed               Any problem with [facility] with CLs?  Eye feel dry sometimes with the   contact lenses.                Headaches?  Occasional   Eye pain/discomfort?  no                                                                                     Flashes?  no  Floaters?  no  Diplopia/Double vision?  no  Patient's Ocular History:         Any eye surgeries? no         Any eye injury?  no         Any treatment for eye disease?  no  Family history of eye disease?  no  Significant patient medical history:         1. Diabetes?  no       If yes, IDDM or NIDDM? N/a   2. HBP?  yes              3. Other (describe):  no   ! OTC eyedrops currently using:  no   ! Prescription eye meds currently using:  no   ! Any history of allergy/adverse reaction to any eye meds used   previously?  no    ! Any history of allergy/adverse reaction to eyedrops used during prior   eye exam(s)? no    ! Any history of  "allergy/adverse reaction to Novacaine or similar meds?   no   ! Any history of allergy/adverse reaction to Epinephrine or similar meds?   no    ! Patient okay with use of anesthetic eyedrops to check eye pressure?    yes         ! Patient okay with use of eyedrops to dilate pupils today?  yes   !  Allergies/Medications/Medical History/Family History reviewed today?    yes      PD =   56/53  Desired reading distance =  16.25"  Approx computer distance =                                                                         Last edited by Mat Ortiz, OD on 1/18/2023  3:32 PM.            Assessment /Plan     For exam results, see Encounter Report.    1. Examination of eyes and vision        2. Nuclear sclerosis of both eyes        3. Vitreous floaters of both eyes        4. Screening for eye condition                       Bilateral early nuclear sclerosis of lens, and bilateral minimal posterior subcapsular cataract.    Bilateral vitreous floaters, without evidence of retinal etiology.    Otherwise, ocular health appears good in both eyes.    Prior diagnosis of bilateral compound myopic astigmatism and presbyopia.  Wears corneal RGP contact lenses with distance correction in both eyes.   Uses reading glasses over contact lenses.    Contact lens fit appears satisfactory in both eyes.  Showing need for power change in each contact lenses.  However contact lenses appear to be switched.  Placed lenses into correct eyes.  Wear as such for a few days. Send report on satisfaction with [facility] with lenses worn as such.    Additionally, return within a few days in the early morning for refraction for new spectacle lens Rx to be worn in lieu of contact lenses.   Leave CLs out entirely that day prior to that visit      "

## 2023-01-24 ENCOUNTER — PATIENT MESSAGE (OUTPATIENT)
Dept: OPTOMETRY | Facility: CLINIC | Age: 68
End: 2023-01-24
Payer: MEDICARE

## 2023-01-30 ENCOUNTER — TELEPHONE (OUTPATIENT)
Dept: DERMATOLOGY | Facility: CLINIC | Age: 68
End: 2023-01-30
Payer: MEDICARE

## 2023-01-30 NOTE — TELEPHONE ENCOUNTER
----- Message from Angela Ellington sent at 1/30/2023 12:10 PM CST -----  Regarding: Pt Advice  Contact: Pt @ [phone] Option 2  Pt is Requesting a Call back Regarding Surgery Concerns   Pt states Please Call Ext Line Today do call cell Please call to discuss further .

## 2023-01-30 NOTE — TELEPHONE ENCOUNTER
Pt has taken a lot of time off work recently and cannot make 2/2 7:30am Mohs for BCC nasal bridge. Rescheduled to 3/23 7:30am. Pt confirmed new date and time.

## 2023-02-09 DIAGNOSIS — Z00.00 ENCOUNTER FOR MEDICARE ANNUAL WELLNESS EXAM: ICD-10-CM

## 2023-03-08 ENCOUNTER — TELEPHONE (OUTPATIENT)
Dept: DERMATOLOGY | Facility: CLINIC | Age: 68
End: 2023-03-08
Payer: MEDICARE

## 2023-03-08 NOTE — TELEPHONE ENCOUNTER
----- Message from Vero Israel sent at 3/8/2023 11:06 AM CST -----  Chelsie Foster calling regarding PT wanted to change her appt for the Mohs Surgery to the week of Marlyn if it is available, call back to inform [phone]

## 2023-03-08 NOTE — TELEPHONE ENCOUNTER
LM for pt that there is no availability for Mohs the week before or after Easter and that the next availability is May 1. Asked pt to call back if she would still like to reschedule.

## 2023-03-10 ENCOUNTER — TELEPHONE (OUTPATIENT)
Dept: SURGERY | Facility: CLINIC | Age: 68
End: 2023-03-10
Payer: MEDICARE

## 2023-03-10 NOTE — TELEPHONE ENCOUNTER
----- Message from Sahra Belle sent at 3/10/2023  9:52 AM CST -----  Who Called: Patient    What is the request in detail: Requesting call back to discuss scheduling colonoscopy. Patient is also asking if she can do the less invasive colo-guard test. Please advise.     Can the clinic reply by [facility]? No    Best Call Back Number: [phone]        Additional Information:

## 2023-03-10 NOTE — TELEPHONE ENCOUNTER
Left message with callback number explaining that colonoscopy is recommended every 5 years due to history of cancer in first degree relative. Explained that she may want to discuss this with her PCP.Instructed to call back if needed.

## 2023-03-23 ENCOUNTER — PROCEDURE VISIT (OUTPATIENT)
Dept: DERMATOLOGY | Facility: CLINIC | Age: 68
End: 2023-03-23
Payer: MEDICARE

## 2023-03-23 VITALS
BODY MASS INDEX: 27.16 KG/M2 | HEIGHT: 66 IN | HEART RATE: 57 BPM | WEIGHT: 169 LBS | DIASTOLIC BLOOD PRESSURE: 85 MMHG | SYSTOLIC BLOOD PRESSURE: 137 MMHG

## 2023-03-23 DIAGNOSIS — C44.311 BASAL CELL CARCINOMA (BCC) OF DORSUM OF NOSE: Primary | ICD-10-CM

## 2023-03-23 PROCEDURE — 17312: ICD-10-PCS | Mod: HCNC,S$GLB,, | Performed by: DERMATOLOGY

## 2023-03-23 PROCEDURE — 17311: ICD-10-PCS | Mod: HCNC,S$GLB,, | Performed by: DERMATOLOGY

## 2023-03-23 PROCEDURE — 99499 NO LOS: ICD-10-PCS | Mod: HCNC,S$GLB,, | Performed by: DERMATOLOGY

## 2023-03-23 PROCEDURE — 17312 MOHS ADDL STAGE: CPT | Mod: HCNC,S$GLB,, | Performed by: DERMATOLOGY

## 2023-03-23 PROCEDURE — 13152 CMPLX RPR E/N/E/L 2.6-7.5 CM: CPT | Mod: HCNC,51,S$GLB, | Performed by: DERMATOLOGY

## 2023-03-23 PROCEDURE — 99499 UNLISTED E&M SERVICE: CPT | Mod: HCNC,S$GLB,, | Performed by: DERMATOLOGY

## 2023-03-23 PROCEDURE — 17311 MOHS 1 STAGE H/N/HF/G: CPT | Mod: HCNC,S$GLB,, | Performed by: DERMATOLOGY

## 2023-03-23 PROCEDURE — 13152 PR RECMPL WND LID,NOS,EAR 2.6-7.5 CM: ICD-10-PCS | Mod: HCNC,51,S$GLB, | Performed by: DERMATOLOGY

## 2023-03-23 RX ORDER — CEPHALEXIN 500 MG/1
500 CAPSULE ORAL 3 TIMES DAILY
Qty: 21 CAPSULE | Refills: 0 | Status: SHIPPED | OUTPATIENT
Start: 2023-03-23 | End: 2023-03-30

## 2023-03-23 RX ORDER — HYDROCODONE BITARTRATE AND ACETAMINOPHEN 5; 325 MG/1; MG/1
1 TABLET ORAL EVERY 6 HOURS PRN
Qty: 10 TABLET | Refills: 0 | Status: SHIPPED | OUTPATIENT
Start: 2023-03-23

## 2023-03-23 NOTE — PROGRESS NOTES
PROCEDURE: Mohs' Micrographic Surgery    INDICATION: Location in mask areas of face including central face, nose, eyelids, eyebrows, lips, chin, preauricular, temple, and ear. Biopsy-proven skin cancer of cosmetically and functionally important areas, including head, neck, genital, hand, foot, or areas known for having difficulty in healing, such as the lower anterior legs. Tumor with ill-defined borders.    REFERRING PROVIDER: Kiana Roe M.D.    CASE NUMBER:     ANESTHETIC: 4.5 cc 0.5% Bupivacaine with Epi 1:200,000 mixed 1:1 with plain 1% Lidocaine    SURGICAL PREP: Hibiclens    SURGEON: Greyson Patel MD    ASSISTANTS: Izabela Townsend PA-C, Emily Murillo MA, and Kiya Diamond, Surg Tech    PREOPERATIVE DIAGNOSIS: basal cell carcinoma- superficial, nodular    POSTOPERATIVE DIAGNOSIS: basal cell carcinoma- superficial    PATHOLOGIC DIAGNOSIS: basal cell carcinoma- superficial    HISTOLOGY OF SPECIMENS IN FIRST STAGE:   Tumor Type: Tumor seen. Superficial basal cell carcinoma: Foci of basaloid cells with peripheral palisading and focal retraction artifact arising along the dermoepidermal junction and extending into the papillary dermis.   Depth of Invasion: epidermis and dermis  Perineural Invasion: No    HISTOLOGY OF SPECIMENS IN SUBSEQUENT STAGES:  Tumor Type: Tumor seen. Same as in first stage.   Depth of Invasion: epidermis and dermis  Perineural Invasion: No    STAGES OF MOHS' SURGERY PERFORMED: 3    TUMOR-FREE PLANE ACHIEVED: Yes    HEMOSTASIS: electrocoagulation     SPECIMENS: 4 (2 in stage A, 1 in stage B, and 1 in stage C)    LOCATION: nasal bridge. Location verified with Dr. Roe's clinical photograph. Patient also verified location with hand held mirror.     INITIAL LESION SIZE: 0.4 x 0.5 cm    FINAL DEFECT SIZE: 1.1 x 1.3 cm    WOUND REPAIR/DISPOSITION: The patient tolerated Mohs' Micrographic Surgery for a basal cell carcinoma very well. When the tumor was completely removed, a repair of  "the surgical defect was undertaken.    PROCEDURE: Complex Linear Repair    INDICATION: Status post Mohs' Micrographic Surgery for basal cell carcinoma.    CASE NUMBER:     SURGEON: Greyson Patel MD    ASSISTANTS: Izabela Townsend PA-C and Emily Murillo MA    ANESTHETIC: 1.5 cc 1% Lidocaine with Epinephrine 1:100,000    SURGICAL PREP: Hibiclens, prepped by Emily Murillo MA    LOCATION: nasal bridge    DEFECT SIZE: 1.1 x 1.3 cm    WOUND REPAIR/DISPOSITION:  After the patient's carcinoma had been completely removed with Mohs' Micrographic Surgery, a repair of the surgical defect was undertaken. The patient was returned to the operating suite where the area of nasal bridge was prepped, draped, and anesthetized in the usual sterile fashion. The wound was widely undermined in all directions. The wound was undermined to a distance at least the maximum width of the defect as measured perpendicular to the closure line along at least one entire edge of the defect, in this case 2 cm. Then, electrocoagulation was used to obtain meticulous hemostasis. 4-0 Vicryl buried vertical mattress sutures were placed into the subcutaneous and dermal plane to close the wound and sidney the cutaneous wound edge. Bilateral dog ears were identified and were removed by a standard Burow's triangle technique. The cutaneous wound edges were closed using interrupted 5-0 Prolene suture.    The patient tolerated the procedure well.    The area was cleaned and dressed appropriately and the patient was given wound care instructions, as well as appointment for follow-up evaluation and suture removal in 7 days. Patient was placed on Norco 5-325 mg prn postop pain and Keflex 500 mg TID x 7 days.    LENGTH OF REPAIR: 2.7 cm    Vitals:    03/23/23 0728 03/23/23 1137   BP: 131/78 137/85   BP Location: Left arm    Patient Position: Sitting    BP Method: Medium (Automatic)    Pulse: 83 (!) 57   Weight: 76.7 kg (169 lb)    Height: 5' 6" (1.676 m)          "

## 2023-03-30 ENCOUNTER — OFFICE VISIT (OUTPATIENT)
Dept: DERMATOLOGY | Facility: CLINIC | Age: 68
End: 2023-03-30
Payer: MEDICARE

## 2023-03-30 DIAGNOSIS — Z09 POSTOP CHECK: Primary | ICD-10-CM

## 2023-03-30 PROCEDURE — 1101F PT FALLS ASSESS-DOCD LE1/YR: CPT | Mod: HCNC,CPTII,S$GLB, | Performed by: DERMATOLOGY

## 2023-03-30 PROCEDURE — 3288F PR FALLS RISK ASSESSMENT DOCUMENTED: ICD-10-PCS | Mod: HCNC,CPTII,S$GLB, | Performed by: DERMATOLOGY

## 2023-03-30 PROCEDURE — 3288F FALL RISK ASSESSMENT DOCD: CPT | Mod: HCNC,CPTII,S$GLB, | Performed by: DERMATOLOGY

## 2023-03-30 PROCEDURE — 99999 PR PBB SHADOW E&M-EST. PATIENT-LVL II: ICD-10-PCS | Mod: PBBFAC,HCNC,, | Performed by: DERMATOLOGY

## 2023-03-30 PROCEDURE — 1126F PR PAIN SEVERITY QUANTIFIED, NO PAIN PRESENT: ICD-10-PCS | Mod: HCNC,CPTII,S$GLB, | Performed by: DERMATOLOGY

## 2023-03-30 PROCEDURE — 1101F PR PT FALLS ASSESS DOC 0-1 FALLS W/OUT INJ PAST YR: ICD-10-PCS | Mod: HCNC,CPTII,S$GLB, | Performed by: DERMATOLOGY

## 2023-03-30 PROCEDURE — 99999 PR PBB SHADOW E&M-EST. PATIENT-LVL II: CPT | Mod: PBBFAC,HCNC,, | Performed by: DERMATOLOGY

## 2023-03-30 PROCEDURE — 99024 POSTOP FOLLOW-UP VISIT: CPT | Mod: HCNC,S$GLB,, | Performed by: DERMATOLOGY

## 2023-03-30 PROCEDURE — 1126F AMNT PAIN NOTED NONE PRSNT: CPT | Mod: HCNC,CPTII,S$GLB, | Performed by: DERMATOLOGY

## 2023-03-30 PROCEDURE — 99024 PR POST-OP FOLLOW-UP VISIT: ICD-10-PCS | Mod: HCNC,S$GLB,, | Performed by: DERMATOLOGY

## 2023-03-30 NOTE — PROGRESS NOTES
67 y.o. female patient is here for suture removal following Mohs' surgery.    Patient reports no problems.    WOUND PE:  The nasal bridge sutures intact. Wound healing well. Good skin edges. No signs or symptoms of infection.      IMPRESSION:  Healing operative site from Mohs' surgery BCC nasal bridge s/p Moh's with CLC, postop day #7.    PLAN:  Sutures removed today by  Caroline Roe MA . Steri-strips applied.  Continue wound care.  Keep moist with Aquaphor.    RTC:  In 1 month

## 2023-04-12 NOTE — TELEPHONE ENCOUNTER
Refill Routing Note   Medication(s) are not appropriate for processing by [facility] for the following reason(s):      Non-participating provider    [facility] action(s):  Route            Appointments  past 12m or future 3m with PCP    Date Provider   Last Visit   10/27/2021 Marie Velazquez PA-C   Next Visit   Visit date not found Marie Velazquez PA-C   ED visits in past 90 days: 0        Note composed:1:21 PM 04/12/2023

## 2023-04-13 RX ORDER — PANTOPRAZOLE SODIUM 40 MG/1
TABLET, DELAYED RELEASE ORAL
Qty: 90 TABLET | Refills: 3 | Status: SHIPPED | OUTPATIENT
Start: 2023-04-13

## 2023-04-26 ENCOUNTER — PATIENT MESSAGE (OUTPATIENT)
Dept: INTERNAL MEDICINE | Facility: CLINIC | Age: 68
End: 2023-04-26
Payer: MEDICARE

## 2023-04-26 DIAGNOSIS — Z12.31 ENCOUNTER FOR SCREENING MAMMOGRAM FOR BREAST CANCER: Primary | ICD-10-CM

## 2023-05-03 ENCOUNTER — TELEPHONE (OUTPATIENT)
Dept: DERMATOLOGY | Facility: CLINIC | Age: 68
End: 2023-05-03
Payer: MEDICARE

## 2023-05-03 ENCOUNTER — PATIENT MESSAGE (OUTPATIENT)
Dept: INTERNAL MEDICINE | Facility: CLINIC | Age: 68
End: 2023-05-03
Payer: MEDICARE

## 2023-05-03 NOTE — TELEPHONE ENCOUNTER
Called pt to triage per portal message. Pt c/o generalized itching behind neck and ears, to back of head and stomach. Pt also c/o rash to these areas, onset 2 weeks ago. Pt states when she takes Benadryl, it will decrease the itching but the rash remains. Pt states she did change her shampoo, last used on Monday. Encouraged pt to stop using the shampoo to see if the symptoms would resolve. Pt denies using any other new detergents, soap. Pt denies any other sx. Please advise.

## 2023-05-03 NOTE — TELEPHONE ENCOUNTER
Pt had mohs surgery on 3/23/23 on nasal bridge with CLC. 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 Informed pt that it can take up to 6 months for it to fade. Pt to send photo via the portal and cancelled in person f/up scheduled for tomorrow at 230 pm.

## 2023-05-08 ENCOUNTER — PATIENT MESSAGE (OUTPATIENT)
Dept: INTERNAL MEDICINE | Facility: CLINIC | Age: 68
End: 2023-05-08
Payer: MEDICARE

## 2023-05-08 ENCOUNTER — TELEPHONE (OUTPATIENT)
Dept: INTERNAL MEDICINE | Facility: CLINIC | Age: 68
End: 2023-05-08
Payer: MEDICARE

## 2023-05-09 ENCOUNTER — OFFICE VISIT (OUTPATIENT)
Dept: INTERNAL MEDICINE | Facility: CLINIC | Age: 68
End: 2023-05-09
Payer: MEDICARE

## 2023-05-09 VITALS
OXYGEN SATURATION: 97 % | WEIGHT: 165.25 LBS | DIASTOLIC BLOOD PRESSURE: 82 MMHG | SYSTOLIC BLOOD PRESSURE: 142 MMHG | HEIGHT: 66 IN | HEART RATE: 101 BPM | BODY MASS INDEX: 26.56 KG/M2

## 2023-05-09 DIAGNOSIS — L25.9 CONTACT DERMATITIS, UNSPECIFIED CONTACT DERMATITIS TYPE, UNSPECIFIED TRIGGER: Primary | ICD-10-CM

## 2023-05-09 DIAGNOSIS — I10 PRIMARY HYPERTENSION: ICD-10-CM

## 2023-05-09 DIAGNOSIS — Z80.0 FAMILY HISTORY OF COLON CANCER: ICD-10-CM

## 2023-05-09 PROCEDURE — 3079F PR MOST RECENT DIASTOLIC BLOOD PRESSURE 80-89 MM HG: ICD-10-PCS | Mod: HCNC,CPTII,S$GLB, | Performed by: INTERNAL MEDICINE

## 2023-05-09 PROCEDURE — 3077F SYST BP >= 140 MM HG: CPT | Mod: HCNC,CPTII,S$GLB, | Performed by: INTERNAL MEDICINE

## 2023-05-09 PROCEDURE — 3288F PR FALLS RISK ASSESSMENT DOCUMENTED: ICD-10-PCS | Mod: HCNC,CPTII,S$GLB, | Performed by: INTERNAL MEDICINE

## 2023-05-09 PROCEDURE — 99214 OFFICE O/P EST MOD 30 MIN: CPT | Mod: HCNC,25,S$GLB, | Performed by: INTERNAL MEDICINE

## 2023-05-09 PROCEDURE — 3079F DIAST BP 80-89 MM HG: CPT | Mod: HCNC,CPTII,S$GLB, | Performed by: INTERNAL MEDICINE

## 2023-05-09 PROCEDURE — 1159F MED LIST DOCD IN RCRD: CPT | Mod: HCNC,CPTII,S$GLB, | Performed by: INTERNAL MEDICINE

## 2023-05-09 PROCEDURE — 3008F PR BODY MASS INDEX (BMI) DOCUMENTED: ICD-10-PCS | Mod: HCNC,CPTII,S$GLB, | Performed by: INTERNAL MEDICINE

## 2023-05-09 PROCEDURE — 96372 THER/PROPH/DIAG INJ SC/IM: CPT | Mod: HCNC,S$GLB,, | Performed by: INTERNAL MEDICINE

## 2023-05-09 PROCEDURE — 1101F PT FALLS ASSESS-DOCD LE1/YR: CPT | Mod: HCNC,CPTII,S$GLB, | Performed by: INTERNAL MEDICINE

## 2023-05-09 PROCEDURE — 1101F PR PT FALLS ASSESS DOC 0-1 FALLS W/OUT INJ PAST YR: ICD-10-PCS | Mod: HCNC,CPTII,S$GLB, | Performed by: INTERNAL MEDICINE

## 2023-05-09 PROCEDURE — 99999 PR PBB SHADOW E&M-EST. PATIENT-LVL IV: CPT | Mod: PBBFAC,HCNC,, | Performed by: INTERNAL MEDICINE

## 2023-05-09 PROCEDURE — 1159F PR MEDICATION LIST DOCUMENTED IN MEDICAL RECORD: ICD-10-PCS | Mod: HCNC,CPTII,S$GLB, | Performed by: INTERNAL MEDICINE

## 2023-05-09 PROCEDURE — 3288F FALL RISK ASSESSMENT DOCD: CPT | Mod: HCNC,CPTII,S$GLB, | Performed by: INTERNAL MEDICINE

## 2023-05-09 PROCEDURE — 1126F AMNT PAIN NOTED NONE PRSNT: CPT | Mod: HCNC,CPTII,S$GLB, | Performed by: INTERNAL MEDICINE

## 2023-05-09 PROCEDURE — 99214 PR OFFICE/OUTPT VISIT, EST, LEVL IV, 30-39 MIN: ICD-10-PCS | Mod: HCNC,25,S$GLB, | Performed by: INTERNAL MEDICINE

## 2023-05-09 PROCEDURE — 1126F PR PAIN SEVERITY QUANTIFIED, NO PAIN PRESENT: ICD-10-PCS | Mod: HCNC,CPTII,S$GLB, | Performed by: INTERNAL MEDICINE

## 2023-05-09 PROCEDURE — 99999 PR PBB SHADOW E&M-EST. PATIENT-LVL IV: ICD-10-PCS | Mod: PBBFAC,HCNC,, | Performed by: INTERNAL MEDICINE

## 2023-05-09 PROCEDURE — 3008F BODY MASS INDEX DOCD: CPT | Mod: HCNC,CPTII,S$GLB, | Performed by: INTERNAL MEDICINE

## 2023-05-09 PROCEDURE — 96372 PR INJECTION,THERAP/PROPH/DIAG2ST, IM OR SUBCUT: ICD-10-PCS | Mod: HCNC,S$GLB,, | Performed by: INTERNAL MEDICINE

## 2023-05-09 PROCEDURE — 3077F PR MOST RECENT SYSTOLIC BLOOD PRESSURE >= 140 MM HG: ICD-10-PCS | Mod: HCNC,CPTII,S$GLB, | Performed by: INTERNAL MEDICINE

## 2023-05-09 RX ORDER — BETAMETHASONE SODIUM PHOSPHATE AND BETAMETHASONE ACETATE 3; 3 MG/ML; MG/ML
12 INJECTION, SUSPENSION INTRA-ARTICULAR; INTRALESIONAL; INTRAMUSCULAR; SOFT TISSUE
Status: COMPLETED | OUTPATIENT
Start: 2023-05-09 | End: 2023-05-09

## 2023-05-09 RX ORDER — TRIAMCINOLONE ACETONIDE 1 MG/G
CREAM TOPICAL 2 TIMES DAILY
Qty: 45 G | Refills: 1 | Status: SHIPPED | OUTPATIENT
Start: 2023-05-09

## 2023-05-09 RX ADMIN — BETAMETHASONE SODIUM PHOSPHATE AND BETAMETHASONE ACETATE 12 MG: 3; 3 INJECTION, SUSPENSION INTRA-ARTICULAR; INTRALESIONAL; INTRAMUSCULAR; SOFT TISSUE at 10:05

## 2023-05-09 NOTE — PROGRESS NOTES
Subjective     Patient ID: Chelsie Foster is a 67 y.o. female.    Chief Complaint: Rash    HPI  Rash began at back of ears about 2-3 weeks ago.  Spread to chest , abdomen, arms.   Rash began after starting a new shampoo.  Itches off and on.    Severity flares.  No whelps  She has applied benadryl and calamine.  The latter has helped a little.  No known exposure to poison ivy.  Zyrtec helps a little.    No new meds.    Interim resection BCC of nose.    She plans on scheduling colonoscopy.  Father had colon cancer.    She had aspiration pneumonia after last scope 5/2016, so is apprehensive about having another scope..     She has had some lower abd pain, periodically, on a chronic basis.    L lat thigh pain when sitting, not when walking.  No back pain.    BP uncontrolled today.  Doesn't monitor.   137/85 in Derm in March.    Review of Systems   Integumentary:  Positive for rash.        Objective     Physical Exam  Constitutional:       Appearance: Normal appearance. She is obese.   Skin:     Findings: Rash present.      Comments: Diffuse erythema post neck and behind L ear and extending around lower neck bilat and  upper chest.  Erythematous patch  to L of umbilicus.   Neurological:      General: No focal deficit present.      Mental Status: She is alert and oriented to person, place, and time.   Psychiatric:         Mood and Affect: Mood normal.         Behavior: Behavior normal.         Thought Content: Thought content normal.          144/94.  Lightheaded at times.    Assessment and Plan     Problem List Items Addressed This Visit       Hypertension    Family history of colon cancer    Relevant Orders    Ambulatory referral/consult to Endo Procedure      Other Visit Diagnoses       Contact dermatitis, unspecified contact dermatitis type, unspecified trigger    -  Primary            Chelsie was seen today for rash.    Diagnoses and all orders for this visit:    Contact dermatitis, unspecified contact  dermatitis type, unspecified trigger    Primary hypertension    Family history of colon cancer  -     Ambulatory referral/consult to Endo Procedure ; Future    Other orders  -     betamethasone acetate-betamethasone sodium phosphate injection 12 mg  -     triamcinolone acetonide 0.1% (KENALOG) 0.1 % cream; Apply topically 2 (two) times daily.         We discussed starting arb - she declines for now.    Nurse BP check.

## 2023-05-16 RX ORDER — LORAZEPAM 1 MG/1
TABLET ORAL
Qty: 30 TABLET | Refills: 0 | Status: SHIPPED | OUTPATIENT
Start: 2023-05-16 | End: 2023-06-15 | Stop reason: SDUPTHER

## 2023-05-16 NOTE — TELEPHONE ENCOUNTER
No care due was identified.  [facility] Embedded Care Due Messages. Reference number: [id number].   5/15/2023 7:34:20 PM CDT

## 2023-05-26 ENCOUNTER — HOSPITAL ENCOUNTER (OUTPATIENT)
Dept: RADIOLOGY | Facility: HOSPITAL | Age: 68
Discharge: HOME | End: 2023-05-26
Attending: INTERNAL MEDICINE
Payer: MEDICARE

## 2023-05-26 DIAGNOSIS — Z12.31 ENCOUNTER FOR SCREENING MAMMOGRAM FOR BREAST CANCER: ICD-10-CM

## 2023-05-26 PROCEDURE — 77067 SCR MAMMO BI INCL CAD: CPT | Mod: TC,HCNC

## 2023-05-26 PROCEDURE — 77063 MAMMO DIGITAL SCREENING BILAT WITH TOMO: ICD-10-PCS | Mod: 26,HCNC,, | Performed by: RADIOLOGY

## 2023-05-26 PROCEDURE — 77067 MAMMO DIGITAL SCREENING BILAT WITH TOMO: ICD-10-PCS | Mod: 26,HCNC,, | Performed by: RADIOLOGY

## 2023-05-26 PROCEDURE — 77067 SCR MAMMO BI INCL CAD: CPT | Mod: 26,HCNC,, | Performed by: RADIOLOGY

## 2023-05-26 PROCEDURE — 77063 BREAST TOMOSYNTHESIS BI: CPT | Mod: 26,HCNC,, | Performed by: RADIOLOGY

## 2023-05-30 ENCOUNTER — PATIENT MESSAGE (OUTPATIENT)
Dept: INTERNAL MEDICINE | Facility: CLINIC | Age: 68
End: 2023-05-30
Payer: MEDICARE

## 2023-05-30 ENCOUNTER — TELEPHONE (OUTPATIENT)
Dept: DERMATOLOGY | Facility: CLINIC | Age: 68
End: 2023-05-30
Payer: MEDICARE

## 2023-05-30 DIAGNOSIS — R21 RASH: Primary | ICD-10-CM

## 2023-05-30 NOTE — TELEPHONE ENCOUNTER
Called patient to schedule an appointment with Dermatology, per message. No answer. Message was left asking patient to call the office back for an appointment.     ----- Message from Luz Burton sent at 5/30/2023  2:24 PM CDT -----  Regarding: Appointment  Contact: [phone]  Calling in regards to getting an appt for a rash as soon as possible. Please call pt to schedule.

## 2023-05-31 ENCOUNTER — TELEPHONE (OUTPATIENT)
Dept: DERMATOLOGY | Facility: CLINIC | Age: 68
End: 2023-05-31
Payer: MEDICARE

## 2023-05-31 NOTE — TELEPHONE ENCOUNTER
Spoke with pt - scheduled appointment in acute derm for June 21st. Informed pt this is our next available if it worsens to reach back out to primary doctor     ----- Message from Ani Mckeon sent at 5/30/2023  3:53 PM CDT -----  Regarding: appt  Contact: @ [phone]  Pt is returning a missed call from Pari ... in regards to making a appointment ...Please call and adv @ [phone]

## 2023-06-01 ENCOUNTER — PATIENT MESSAGE (OUTPATIENT)
Dept: INTERNAL MEDICINE | Facility: CLINIC | Age: 68
End: 2023-06-01
Payer: MEDICARE

## 2023-06-01 RX ORDER — PREDNISONE 20 MG/1
TABLET ORAL
Qty: 15 TABLET | Refills: 0 | Status: SHIPPED | OUTPATIENT
Start: 2023-06-01 | End: 2023-06-12

## 2023-06-05 ENCOUNTER — CLINICAL SUPPORT (OUTPATIENT)
Dept: INTERNAL MEDICINE | Facility: CLINIC | Age: 68
End: 2023-06-05
Payer: MEDICARE

## 2023-06-05 VITALS
OXYGEN SATURATION: 96 % | DIASTOLIC BLOOD PRESSURE: 84 MMHG | HEART RATE: 58 BPM | SYSTOLIC BLOOD PRESSURE: 132 MMHG | RESPIRATION RATE: 16 BRPM

## 2023-06-05 PROCEDURE — 99999 PR PBB SHADOW E&M-EST. PATIENT-LVL III: CPT | Mod: PBBFAC,HCNC,,

## 2023-06-05 PROCEDURE — 99999 PR PBB SHADOW E&M-EST. PATIENT-LVL III: ICD-10-PCS | Mod: PBBFAC,HCNC,,

## 2023-06-05 NOTE — PROGRESS NOTES
Chelsie Foster 67 y.o. female is here for Blood Pressure check. in person    Manual Blood pressure reading was  132/84, Pulse 58. (Checked at the beginning of the visit)    If high, was it repeated after 15 minutes? no    Diagnosed with Hypertension yes.    Patient took blood pressure medication today yes.  Last dose of blood pressure medication was taken at this morning. Patient took all BP medications.     All Medications and OTC medication updated yes    Does patient have record of home blood pressure readings / Blood Pressure Log no.     Does the pt have any complaints today in regards to their blood pressure medication? no. Complains of N/A. Patient is asymptomatic.     Were you sitting still for 5-10 minutes prior to taking your Blood pressure? yes     Has your blood pressure monitor ever been checked? no When was last time we checked your blood pressure monitor? N/A    Updated vitals yes        Creatinine   Date Value Ref Range Status   09/26/2022 0.7 0.5 - 1.4 mg/dL Final     Sodium   Date Value Ref Range Status   09/26/2022 141 136 - 145 mmol/L Final     Potassium   Date Value Ref Range Status   09/26/2022 3.9 3.5 - 5.1 mmol/L Final          Follow up date is TBD. Pt was a little flustered, her Mammogram from 5/25/23 has not yet been read. So she was a little anxious.     Dr. Carrillo notified.

## 2023-06-05 NOTE — Clinical Note
Pt BP was 132/84, would you like her to follow up. I put in the visit note that she was a little flustered due to her Mammo not yet being red.

## 2023-06-15 RX ORDER — LORAZEPAM 1 MG/1
TABLET ORAL
Qty: 30 TABLET | Refills: 0 | Status: SHIPPED | OUTPATIENT
Start: 2023-06-15 | End: 2023-07-24 | Stop reason: SDUPTHER

## 2023-06-15 NOTE — TELEPHONE ENCOUNTER
Refill Routing Note   Medication(s) are not appropriate for processing by [facility] for the following reason(s):      Medication outside of protocol    [facility] action(s):  Route None identified          Appointments  past 12m or future 3m with PCP    Date Provider   Last Visit   5/9/2023 Christi Carrillo MD   Next Visit   Visit date not found Christi Carrillo MD   ED visits in past 90 days: 0        Note composed:11:59 AM 06/15/2023

## 2023-06-15 NOTE — TELEPHONE ENCOUNTER
No care due was identified.  [facility] Embedded Care Due Messages. Reference number: [id number].   6/15/2023 11:49:09 AM CDT

## 2023-06-21 ENCOUNTER — OFFICE VISIT (OUTPATIENT)
Dept: DERMATOLOGY | Facility: CLINIC | Age: 68
End: 2023-06-21
Payer: MEDICARE

## 2023-06-21 DIAGNOSIS — R21 RASH: ICD-10-CM

## 2023-06-21 DIAGNOSIS — L30.9 ECZEMA, UNSPECIFIED TYPE: Primary | ICD-10-CM

## 2023-06-21 PROCEDURE — 1101F PR PT FALLS ASSESS DOC 0-1 FALLS W/OUT INJ PAST YR: ICD-10-PCS | Mod: HCNC,CPTII,S$GLB, | Performed by: DERMATOLOGY

## 2023-06-21 PROCEDURE — 99213 PR OFFICE/OUTPT VISIT, EST, LEVL III, 20-29 MIN: ICD-10-PCS | Mod: HCNC,S$GLB,, | Performed by: DERMATOLOGY

## 2023-06-21 PROCEDURE — 99213 OFFICE O/P EST LOW 20 MIN: CPT | Mod: HCNC,S$GLB,, | Performed by: DERMATOLOGY

## 2023-06-21 PROCEDURE — 1159F PR MEDICATION LIST DOCUMENTED IN MEDICAL RECORD: ICD-10-PCS | Mod: HCNC,CPTII,S$GLB, | Performed by: DERMATOLOGY

## 2023-06-21 PROCEDURE — 1160F RVW MEDS BY RX/DR IN RCRD: CPT | Mod: HCNC,CPTII,S$GLB, | Performed by: DERMATOLOGY

## 2023-06-21 PROCEDURE — 1126F PR PAIN SEVERITY QUANTIFIED, NO PAIN PRESENT: ICD-10-PCS | Mod: HCNC,CPTII,S$GLB, | Performed by: DERMATOLOGY

## 2023-06-21 PROCEDURE — 3288F PR FALLS RISK ASSESSMENT DOCUMENTED: ICD-10-PCS | Mod: HCNC,CPTII,S$GLB, | Performed by: DERMATOLOGY

## 2023-06-21 PROCEDURE — 1160F PR REVIEW ALL MEDS BY PRESCRIBER/CLIN PHARMACIST DOCUMENTED: ICD-10-PCS | Mod: HCNC,CPTII,S$GLB, | Performed by: DERMATOLOGY

## 2023-06-21 PROCEDURE — 1126F AMNT PAIN NOTED NONE PRSNT: CPT | Mod: HCNC,CPTII,S$GLB, | Performed by: DERMATOLOGY

## 2023-06-21 PROCEDURE — 1159F MED LIST DOCD IN RCRD: CPT | Mod: HCNC,CPTII,S$GLB, | Performed by: DERMATOLOGY

## 2023-06-21 PROCEDURE — 99999 PR PBB SHADOW E&M-EST. PATIENT-LVL III: ICD-10-PCS | Mod: PBBFAC,HCNC,,

## 2023-06-21 PROCEDURE — 3288F FALL RISK ASSESSMENT DOCD: CPT | Mod: HCNC,CPTII,S$GLB, | Performed by: DERMATOLOGY

## 2023-06-21 PROCEDURE — 1101F PT FALLS ASSESS-DOCD LE1/YR: CPT | Mod: HCNC,CPTII,S$GLB, | Performed by: DERMATOLOGY

## 2023-06-21 PROCEDURE — 99999 PR PBB SHADOW E&M-EST. PATIENT-LVL III: CPT | Mod: PBBFAC,HCNC,,

## 2023-06-21 RX ORDER — TRIAMCINOLONE ACETONIDE 1 MG/G
OINTMENT TOPICAL
Qty: 454 G | Refills: 2 | Status: SHIPPED | OUTPATIENT
Start: 2023-06-21

## 2023-06-21 NOTE — PROGRESS NOTES
Subjective:      Patient ID:  Chelsie Foster is a 67 y.o. female who presents for   Chief Complaint   Patient presents with    Rash     67F who presents with itchy rash.  Started with itching in April on neck and upper back then spread to abdomen.  Called PCP who gave CS shot and TAC cream.  Shot helped but TAC did not.  Resolved for 2 weeks then recurred and she called PCP who rx'd PO Pred (40 mg daily for 5 days then 20 mg daily for 5 days) and referred her here. Finished Pred about 2 weeks ago.  No new medications.  Did try to new shampoo prior to rash starting, but stopped using it about 1.5 mo ago, and that may have helped a little bit. Takes hot showers, but not long.  Uses Caress fragranced body wash.  Does not moisturize. No h/o eczema, asthma, allergies.       Rash      Review of Systems   Constitutional: Negative.    Skin:  Positive for itching and rash.     Objective:   Physical Exam   Constitutional: She appears well-developed and well-nourished. No distress.   Neurological: She is alert and oriented to person, place, and time.   Psychiatric: She has a normal mood and affect.   Skin:             Diagram Legend     Erythematous scaling macule/papule c/w actinic keratosis       Vascular papule c/w angioma      Pigmented verrucoid papule/plaque c/w seborrheic keratosis      Yellow umbilicated papule c/w sebaceous hyperplasia      Irregularly shaped tan macule c/w lentigo     1-2 mm smooth white papules consistent with Milia      Movable subcutaneous cyst with punctum c/w epidermal inclusion cyst      Subcutaneous movable cyst c/w pilar cyst      Firm pink to brown papule c/w dermatofibroma      Pedunculated fleshy papule(s) c/w skin tag(s)      Evenly pigmented macule c/w junctional nevus     Mildly variegated pigmented, slightly irregular-bordered macule c/w mildly atypical nevus      Flesh colored to evenly pigmented papule c/w intradermal nevus       Pink pearly papule/plaque c/w basal cell carcinoma       Erythematous hyperkeratotic cursted plaque c/w SCC      Surgical scar with no sign of skin cancer recurrence      Open and closed comedones      Inflammatory papules and pustules      Verrucoid papule consistent consistent with wart     Erythematous eczematous patches and plaques     Dystrophic onycholytic nail with subungual debris c/w onychomycosis     Umbilicated papule    Erythematous-base heme-crusted tan verrucoid plaque consistent with inflamed seborrheic keratosis     Erythematous Silvery Scaling Plaque c/w Psoriasis     See annotation      Assessment / Plan:        Eczema, unspecified type  Suspect ACD vs ICD to shampoo around posterior hairline.  Also with significant xerosis, which is likely contributing to pruritis.   - Extensively discussed dry and sensitive skin care precautions and provided specific product recommendations and samples.   - Instructed patient to use Vanicream shampoo or another shampoo off the Safe list.    -     triamcinolone acetonide 0.1% (KENALOG) 0.1 % ointment; Apply to affected areas of rash twice a day for 2-4 weeks at a time as needed for rash.  Dispense: 454 g; Refill: 2    Follow up if symptoms worsen or fail to improve.

## 2023-06-23 ENCOUNTER — PATIENT MESSAGE (OUTPATIENT)
Dept: DERMATOLOGY | Facility: HOSPITAL | Age: 68
End: 2023-06-23
Payer: MEDICARE

## 2023-06-23 ENCOUNTER — TELEPHONE (OUTPATIENT)
Dept: DERMATOLOGY | Facility: CLINIC | Age: 68
End: 2023-06-23
Payer: MEDICARE

## 2023-06-23 NOTE — TELEPHONE ENCOUNTER
Returned patient's call.  No answer.  LVM.  Will also try to message her.     ----- Message from Pari Oseguera LPN sent at 6/22/2023  9:47 AM CDT -----  Regarding: FW: Medication questions  Contact: @ [phone]    ----- Message -----  From: Luis Solomon  Sent: 6/22/2023   9:32 AM CDT  To: Je Hurley Staff  Subject: Medication questions                             Pt called in stating that she had an appt with the provider yesterday and she stated that she was supposed to receive a new cream for her to start taking. But when she contacted her pharmacy she stated there was only the triamcinolone acetonide 0.1% (KENALOG) 0.1 % ointment which did not work. Pt wants someone to call and explain things to her as soon as possible. Please call pt to discuss further.

## 2023-06-27 ENCOUNTER — PATIENT MESSAGE (OUTPATIENT)
Dept: DERMATOLOGY | Facility: CLINIC | Age: 68
End: 2023-06-27
Payer: MEDICARE

## 2023-06-28 RX ORDER — FLUOCINONIDE 0.5 MG/G
OINTMENT TOPICAL 2 TIMES DAILY
Qty: 60 G | Refills: 2 | Status: SHIPPED | OUTPATIENT
Start: 2023-06-28

## 2023-07-07 ENCOUNTER — TELEPHONE (OUTPATIENT)
Dept: ADMINISTRATIVE | Facility: HOSPITAL | Age: 68
End: 2023-07-07
Payer: MEDICARE

## 2023-07-21 ENCOUNTER — PATIENT MESSAGE (OUTPATIENT)
Dept: ENDOSCOPY | Facility: HOSPITAL | Age: 68
End: 2023-07-21
Payer: MEDICARE

## 2023-07-21 DIAGNOSIS — Z12.11 COLON CANCER SCREENING: Primary | ICD-10-CM

## 2023-07-24 RX ORDER — LORAZEPAM 1 MG/1
TABLET ORAL
Qty: 30 TABLET | Refills: 5 | Status: SHIPPED | OUTPATIENT
Start: 2023-07-24

## 2023-07-24 NOTE — TELEPHONE ENCOUNTER
Care Due:                  Date            Visit Type   Department     Provider  --------------------------------------------------------------------------------                                EP -                              PRIMARY      [facility] INTERNAL  Last Visit: 05-      CARE ([facility])   MEDICINE       TRINI ELLIS  Next Visit: None Scheduled  None         None Found                                                            Last  Test          Frequency    Reason                     Performed    Due Date  --------------------------------------------------------------------------------    CMP.........  12 months..  pravastatin,               09- 09-                             triamterene-hydrochloroth                             iazide...................    Lipid Panel.  12 months..  pravastatin..............  09- 09-    Health [facility] Embedded Care Due Messages. Reference number: [id number].   7/24/2023 8:18:24 AM CDT

## 2023-08-04 ENCOUNTER — PES CALL (OUTPATIENT)
Dept: ADMINISTRATIVE | Facility: CLINIC | Age: 68
End: 2023-08-04
Payer: MEDICARE

## 2023-08-29 ENCOUNTER — TELEPHONE (OUTPATIENT)
Dept: ENDOSCOPY | Facility: HOSPITAL | Age: 68
End: 2023-08-29
Payer: MEDICARE

## 2023-09-15 ENCOUNTER — TELEPHONE (OUTPATIENT)
Dept: ENDOSCOPY | Facility: HOSPITAL | Age: 68
End: 2023-09-15

## 2023-09-15 ENCOUNTER — CLINICAL SUPPORT (OUTPATIENT)
Dept: ENDOSCOPY | Facility: HOSPITAL | Age: 68
End: 2023-09-15
Attending: INTERNAL MEDICINE
Payer: MEDICARE

## 2023-09-15 DIAGNOSIS — Z80.0 FAMILY HISTORY OF COLON CANCER: ICD-10-CM

## 2023-09-15 DIAGNOSIS — Z12.11 COLON CANCER SCREENING: Primary | ICD-10-CM

## 2023-09-15 RX ORDER — POLYETHYLENE GLYCOL 3350, SODIUM SULFATE ANHYDROUS, SODIUM BICARBONATE, SODIUM CHLORIDE, POTASSIUM CHLORIDE 236; 22.74; 6.74; 5.86; 2.97 G/4L; G/4L; G/4L; G/4L; G/4L
4 POWDER, FOR SOLUTION ORAL ONCE
Qty: 4000 ML | Refills: 0 | Status: SHIPPED | OUTPATIENT
Start: 2023-09-15 | End: 2023-09-23

## 2023-09-15 NOTE — TELEPHONE ENCOUNTER
Spoke to patient to schedule procedure(s) Colonoscopy       Physician to perform procedure(s) Dr. PUJA Weiss  Date of Procedure (s) 12/7/23  Arrival Time 9:00 AM  Time of Procedure(s) 10:00 AM   Location of Procedure(s) [address]  Type of Rx Prep sent to patient: PEG  Instructions provided to patient via [facility]     Patient was informed on the following information and verbalized understanding. Screening questionnaire reviewed with patient and complete. If procedure requires anesthesia, a responsible adult needs to be present to accompany the patient home, patient cannot drive after receiving anesthesia. Appointment details are tentative, especially check-in time. Patient will receive a prep-op call 4 days prior to confirm check-in time for procedure. If applicable the patient should contact their pharmacy to verify Rx for procedure prep is ready for pick-up. Patient was advised to call the scheduling department at [phone] if pharmacy states no Rx is available. Patient was advised to call the endoscopy scheduling department if any questions or concerns arise.         Endoscopy Scheduling Department                  Colonoscopy Procedure Prep Instructions     Date of procedure: 12/7/23 Arrive at: 9:00 AM     Location of Department:   [facility] [address] [place] [address]  Take the [facility] Elevators to 2nd Floor Outpatient Surgery     As soon as possible:   your prep from pharmacy and over the counter DULCOLAX LAXATIVE TABLETS             On the day before your procedure   What You CAN do:   You may have clear liquids ONLY-see below for list.      Liquids That Are OK to Drink:   Water  Sports drinks (Gatorade, Power-Aid)  Coffee or tea (no cream or nondairy creamer)  Clear juices without pulp (apple, white grape)  Gelatin desserts (no fruit or toppings)  Clear soda (sprite, coke, ginger ale)  Chicken broth (until 12 midnight the night before procedure)     What  You CANNOT do:   Do not EAT solid food, drink milk or anything   colored red.  Do not drink alcohol.  Do not take oral medications within 1 hour of starting   each dose of prep.  No gum chewing or candy morning of procedure                       Note:   (Please disregard the insert instructions from pharmacy).  PEG Bowel Prep is indicated for cleansing of the colon as a preparation for colonoscopy in adults.   Be sure to tell your doctor about all the medicines you take, including prescription and non-prescription medicines, vitamins, and herbal supplements. PEG Bowel Prep may affect how other medicines work.  Medication taken by mouth may not be absorbed properly when taken within 1 hour before the start of each dose of PEG Bowel Prep.     It is not uncommon to experience some abdominal cramping, nausea and/or vomiting when taking the prep. If you have nausea and/or vomiting while taking the prep, stop drinking for 20 to 30 minutes then continue.        How to take prep:     PEG Bowel Prep is a (2-day) prep.    One (1) bottle of prep are required for a complete preparation for colonoscopy. Dilute the solution concentrate as directed prior to use. You must drink water with each dose of prep, and additional water after each dose.     DOSE 1--Day Before Colonoscopy 12/6/23      Drink at least 6 to 8 glasses of clear liquids from time you wake up until you begin your prep and then continue until bedtime to avoid dehydration.      12:00 pm (NOON) Mix your entire container of prep with lukewarm water and refrigerate. Take four (4) Dulcolax (Bisacodyl) tablets with at least 8 ounces or more of clear liquids.        6:00 pm:     You must complete Steps 1 and 2 below before going to bed:     Step 1-Drink half the liquid in the container within one (1) hour.   Step 2-Refrigerate the remaining half of the liquid for dose 2. See below when to begin this step.                       IMPORTANT: If you experience  preparation-related symptoms (for example, nausea, bloating, or cramping), stop, or slow the rate of drinking the additional water until your symptoms decrease.     DOSE 2--Day of the Colonoscopy 12/7/23 at 2-3 AM.     For this dose, repeat Step 1 shown above using the remaining half of the liquid prep.   You may continue drinking water/clear liquids until   4 hours before your colonoscopy or as directed by the scheduling nurse  6:00 AM.     For more information about your procedure, please watch this informational video. It is important to watch this animated consent video prior to your arrival.   If you haven't watched the video prior to arriving, you are required to watch it during admission which can cause delays.      Options for viewing:  Using a keyboard:  press and hold the control tab (Ctrl) and left mouse click to follow link          Colonoscopy Instructional Video                                                        OR     Type link address into your web browser's address bar:  [email or web address]     Using a mobile phone: tap on web address/link.              IMPORTANT INFORMATION TO KNOW BEFORE YOUR PROCEDURE     [facility] [place] 2nd Floor     If your procedure requires the administration of anesthesia, it is necessary for a responsible adult to drive you home. (Medical Transportation, Uber, Lyft, Taxi, etc. may ONLY be used if a responsible adult is present to accompany you home.  The responsible adult CAN'T be the  of the service).       person must be available to return to pick you up within 30 minutes of being notified of discharge.      Due to the limited socially distant seating in our waiting room, please limit your guest (1) who accompany you for this procedure. If someone accompanies you for this procedure into the facility:     Consider having them proceed to an area that is socially distant other than the lobby until a member of the  medical team contacts them to provide an update after the procedure.      Also, please consider being dropped off and picked up from the facility.        Please bring a picture ID, insurance card, & copayment     Take Medications as directed below:        If you begin taking any blood thinning medications, please contact the  listed below as soon as possible.     If you are diabetic see the attached instruction sheet regarding your medication.      If you take HEART, BLOOD PRESSURE, SEIZURE, PAIN, LUNG (including inhalers/nebulizers), ANTI-REJECTION (transplant patients), or PSYCHIATRIC medications, please take at your regular times with a sip of water or as directed by the scheduling nurse.      Important contact information:     Endoscopy [phone] Hours of operation Monday-Friday 8:00-4:30pm.     Questions about insurance or financial obligations call [phone] or [phone].     If you have questions regarding the prep or need to reschedule, please call [phone]. After hours questions requiring immediate assistance, contact [facility] nurse line at [phone] or [phone].   NOTE:      On occasion, unforeseen circumstances may cause a delay in your procedure start time. We respect your time and appreciate your patience during these circumstances.

## 2023-09-15 NOTE — PLAN OF CARE
Spoke to patient to schedule procedure(s) Colonoscopy       Physician to perform procedure(s) Dr. PUJA Weiss  Date of Procedure (s) 12/7/23  Arrival Time 9:00 AM  Time of Procedure(s) 10:00 AM   Location of Procedure(s) [place] 2nd Floor  Type of Rx Prep sent to patient: PEG  Instructions provided to patient via [facility]    Patient was informed on the following information and verbalized understanding. Screening questionnaire reviewed with patient and complete. If procedure requires anesthesia, a responsible adult needs to be present to accompany the patient home, patient cannot drive after receiving anesthesia. Appointment details are tentative, especially check-in time. Patient will receive a prep-op call 4 days prior to confirm check-in time for procedure. If applicable the patient should contact their pharmacy to verify Rx for procedure prep is ready for pick-up. Patient was advised to call the scheduling department at [phone] if pharmacy states no Rx is available. Patient was advised to call the endoscopy scheduling department if any questions or concerns arise.      [facility] Endoscopy Scheduling Department

## 2023-09-20 DIAGNOSIS — Z78.0 MENOPAUSE: ICD-10-CM

## 2023-09-27 DIAGNOSIS — I10 HYPERTENSION: ICD-10-CM

## 2023-10-05 ENCOUNTER — PATIENT MESSAGE (OUTPATIENT)
Dept: INTERNAL MEDICINE | Facility: CLINIC | Age: 68
End: 2023-10-05
Payer: MEDICARE

## 2023-10-05 DIAGNOSIS — E03.4 HYPOTHYROIDISM DUE TO ACQUIRED ATROPHY OF THYROID: ICD-10-CM

## 2023-10-05 DIAGNOSIS — I10 PRIMARY HYPERTENSION: ICD-10-CM

## 2023-10-05 DIAGNOSIS — Z00.00 ANNUAL PHYSICAL EXAM: Primary | ICD-10-CM

## 2023-10-05 DIAGNOSIS — E78.5 HYPERLIPIDEMIA, UNSPECIFIED HYPERLIPIDEMIA TYPE: ICD-10-CM

## 2023-10-11 ENCOUNTER — LAB VISIT (OUTPATIENT)
Dept: LAB | Facility: HOSPITAL | Age: 68
End: 2023-10-11
Payer: MEDICARE

## 2023-10-11 DIAGNOSIS — E78.5 HYPERLIPIDEMIA, UNSPECIFIED HYPERLIPIDEMIA TYPE: ICD-10-CM

## 2023-10-11 DIAGNOSIS — E03.4 HYPOTHYROIDISM DUE TO ACQUIRED ATROPHY OF THYROID: ICD-10-CM

## 2023-10-11 DIAGNOSIS — Z00.00 ANNUAL PHYSICAL EXAM: ICD-10-CM

## 2023-10-11 LAB
ALBUMIN SERPL BCP-MCNC: 3.8 G/DL (ref 3.5–5.2)
ALP SERPL-CCNC: 103 U/L (ref 55–135)
ALT SERPL W/O P-5'-P-CCNC: 25 U/L (ref 10–44)
ANION GAP SERPL CALC-SCNC: 11 MMOL/L (ref 8–16)
AST SERPL-CCNC: 20 U/L (ref 10–40)
BASOPHILS # BLD AUTO: 0.05 K/UL (ref 0–0.2)
BASOPHILS NFR BLD: 0.6 % (ref 0–1.9)
BILIRUB SERPL-MCNC: 0.4 MG/DL (ref 0.1–1)
BUN SERPL-MCNC: 16 MG/DL (ref 8–23)
CALCIUM SERPL-MCNC: 10.1 MG/DL (ref 8.7–10.5)
CHLORIDE SERPL-SCNC: 100 MMOL/L (ref 95–110)
CHOLEST SERPL-MCNC: 194 MG/DL (ref 120–199)
CHOLEST/HDLC SERPL: 4.1 {RATIO} (ref 2–5)
CO2 SERPL-SCNC: 30 MMOL/L (ref 23–29)
CREAT SERPL-MCNC: 0.8 MG/DL (ref 0.5–1.4)
DIFFERENTIAL METHOD: ABNORMAL
EOSINOPHIL # BLD AUTO: 0.3 K/UL (ref 0–0.5)
EOSINOPHIL NFR BLD: 3.2 % (ref 0–8)
ERYTHROCYTE [DISTWIDTH] IN BLOOD BY AUTOMATED COUNT: 14.6 % (ref 11.5–14.5)
EST. GFR  (NO RACE VARIABLE): >60 ML/MIN/1.73 M^2
GLUCOSE SERPL-MCNC: 99 MG/DL (ref 70–110)
HCT VFR BLD AUTO: 41.5 % (ref 37–48.5)
HDLC SERPL-MCNC: 47 MG/DL (ref 40–75)
HDLC SERPL: 24.2 % (ref 20–50)
HGB BLD-MCNC: 13.2 G/DL (ref 12–16)
IMM GRANULOCYTES # BLD AUTO: 0.04 K/UL (ref 0–0.04)
IMM GRANULOCYTES NFR BLD AUTO: 0.5 % (ref 0–0.5)
LDLC SERPL CALC-MCNC: 118.6 MG/DL (ref 63–159)
LYMPHOCYTES # BLD AUTO: 1.7 K/UL (ref 1–4.8)
LYMPHOCYTES NFR BLD: 19.9 % (ref 18–48)
MCH RBC QN AUTO: 27.9 PG (ref 27–31)
MCHC RBC AUTO-ENTMCNC: 31.8 G/DL (ref 32–36)
MCV RBC AUTO: 88 FL (ref 82–98)
MONOCYTES # BLD AUTO: 0.8 K/UL (ref 0.3–1)
MONOCYTES NFR BLD: 8.9 % (ref 4–15)
NEUTROPHILS # BLD AUTO: 5.7 K/UL (ref 1.8–7.7)
NEUTROPHILS NFR BLD: 66.9 % (ref 38–73)
NONHDLC SERPL-MCNC: 147 MG/DL
NRBC BLD-RTO: 0 /100 WBC
PLATELET # BLD AUTO: 297 K/UL (ref 150–450)
PMV BLD AUTO: 10.6 FL (ref 9.2–12.9)
POTASSIUM SERPL-SCNC: 3.6 MMOL/L (ref 3.5–5.1)
PROT SERPL-MCNC: 8 G/DL (ref 6–8.4)
RBC # BLD AUTO: 4.73 M/UL (ref 4–5.4)
SODIUM SERPL-SCNC: 141 MMOL/L (ref 136–145)
TRIGL SERPL-MCNC: 142 MG/DL (ref 30–150)
TSH SERPL DL<=0.005 MIU/L-ACNC: 0.52 UIU/ML (ref 0.4–4)
WBC # BLD AUTO: 8.45 K/UL (ref 3.9–12.7)

## 2023-10-11 PROCEDURE — 80061 LIPID PANEL: CPT | Mod: HCNC | Performed by: INTERNAL MEDICINE

## 2023-10-11 PROCEDURE — 85025 COMPLETE CBC W/AUTO DIFF WBC: CPT | Mod: HCNC | Performed by: INTERNAL MEDICINE

## 2023-10-11 PROCEDURE — 84443 ASSAY THYROID STIM HORMONE: CPT | Mod: HCNC | Performed by: INTERNAL MEDICINE

## 2023-10-11 PROCEDURE — 36415 COLL VENOUS BLD VENIPUNCTURE: CPT | Mod: HCNC | Performed by: INTERNAL MEDICINE

## 2023-10-11 PROCEDURE — 80053 COMPREHEN METABOLIC PANEL: CPT | Mod: HCNC | Performed by: INTERNAL MEDICINE

## 2023-10-17 ENCOUNTER — OFFICE VISIT (OUTPATIENT)
Dept: INTERNAL MEDICINE | Facility: CLINIC | Age: 68
End: 2023-10-17
Payer: MEDICARE

## 2023-10-17 VITALS
OXYGEN SATURATION: 96 % | BODY MASS INDEX: 26.89 KG/M2 | DIASTOLIC BLOOD PRESSURE: 70 MMHG | SYSTOLIC BLOOD PRESSURE: 118 MMHG | HEIGHT: 66 IN | HEART RATE: 65 BPM | WEIGHT: 167.31 LBS

## 2023-10-17 DIAGNOSIS — Z00.00 ANNUAL PHYSICAL EXAM: Primary | ICD-10-CM

## 2023-10-17 DIAGNOSIS — E03.4 HYPOTHYROIDISM DUE TO ACQUIRED ATROPHY OF THYROID: ICD-10-CM

## 2023-10-17 DIAGNOSIS — I10 PRIMARY HYPERTENSION: ICD-10-CM

## 2023-10-17 DIAGNOSIS — I10 ESSENTIAL HYPERTENSION: ICD-10-CM

## 2023-10-17 DIAGNOSIS — M54.50 CHRONIC LOW BACK PAIN WITHOUT SCIATICA, UNSPECIFIED BACK PAIN LATERALITY: ICD-10-CM

## 2023-10-17 DIAGNOSIS — N32.81 OVERACTIVE BLADDER: ICD-10-CM

## 2023-10-17 DIAGNOSIS — E78.5 HYPERLIPIDEMIA, UNSPECIFIED HYPERLIPIDEMIA TYPE: ICD-10-CM

## 2023-10-17 DIAGNOSIS — G89.29 CHRONIC LOW BACK PAIN WITHOUT SCIATICA, UNSPECIFIED BACK PAIN LATERALITY: ICD-10-CM

## 2023-10-17 PROCEDURE — 3078F PR MOST RECENT DIASTOLIC BLOOD PRESSURE < 80 MM HG: ICD-10-PCS | Mod: HCNC,CPTII,S$GLB, | Performed by: INTERNAL MEDICINE

## 2023-10-17 PROCEDURE — 1160F RVW MEDS BY RX/DR IN RCRD: CPT | Mod: HCNC,CPTII,S$GLB, | Performed by: INTERNAL MEDICINE

## 2023-10-17 PROCEDURE — 99999 PR PBB SHADOW E&M-EST. PATIENT-LVL V: ICD-10-PCS | Mod: PBBFAC,HCNC,, | Performed by: INTERNAL MEDICINE

## 2023-10-17 PROCEDURE — 1159F PR MEDICATION LIST DOCUMENTED IN MEDICAL RECORD: ICD-10-PCS | Mod: HCNC,CPTII,S$GLB, | Performed by: INTERNAL MEDICINE

## 2023-10-17 PROCEDURE — 99397 PER PM REEVAL EST PAT 65+ YR: CPT | Mod: HCNC,S$GLB,, | Performed by: INTERNAL MEDICINE

## 2023-10-17 PROCEDURE — 1101F PT FALLS ASSESS-DOCD LE1/YR: CPT | Mod: HCNC,CPTII,S$GLB, | Performed by: INTERNAL MEDICINE

## 2023-10-17 PROCEDURE — 1160F PR REVIEW ALL MEDS BY PRESCRIBER/CLIN PHARMACIST DOCUMENTED: ICD-10-PCS | Mod: HCNC,CPTII,S$GLB, | Performed by: INTERNAL MEDICINE

## 2023-10-17 PROCEDURE — 3078F DIAST BP <80 MM HG: CPT | Mod: HCNC,CPTII,S$GLB, | Performed by: INTERNAL MEDICINE

## 2023-10-17 PROCEDURE — 3288F FALL RISK ASSESSMENT DOCD: CPT | Mod: HCNC,CPTII,S$GLB, | Performed by: INTERNAL MEDICINE

## 2023-10-17 PROCEDURE — 1126F AMNT PAIN NOTED NONE PRSNT: CPT | Mod: HCNC,CPTII,S$GLB, | Performed by: INTERNAL MEDICINE

## 2023-10-17 PROCEDURE — 3288F PR FALLS RISK ASSESSMENT DOCUMENTED: ICD-10-PCS | Mod: HCNC,CPTII,S$GLB, | Performed by: INTERNAL MEDICINE

## 2023-10-17 PROCEDURE — 1159F MED LIST DOCD IN RCRD: CPT | Mod: HCNC,CPTII,S$GLB, | Performed by: INTERNAL MEDICINE

## 2023-10-17 PROCEDURE — 1101F PR PT FALLS ASSESS DOC 0-1 FALLS W/OUT INJ PAST YR: ICD-10-PCS | Mod: HCNC,CPTII,S$GLB, | Performed by: INTERNAL MEDICINE

## 2023-10-17 PROCEDURE — 3074F PR MOST RECENT SYSTOLIC BLOOD PRESSURE < 130 MM HG: ICD-10-PCS | Mod: HCNC,CPTII,S$GLB, | Performed by: INTERNAL MEDICINE

## 2023-10-17 PROCEDURE — 99999 PR PBB SHADOW E&M-EST. PATIENT-LVL V: CPT | Mod: PBBFAC,HCNC,, | Performed by: INTERNAL MEDICINE

## 2023-10-17 PROCEDURE — 99397 PR PREVENTIVE VISIT,EST,65 & OVER: ICD-10-PCS | Mod: HCNC,S$GLB,, | Performed by: INTERNAL MEDICINE

## 2023-10-17 PROCEDURE — 3008F PR BODY MASS INDEX (BMI) DOCUMENTED: ICD-10-PCS | Mod: HCNC,CPTII,S$GLB, | Performed by: INTERNAL MEDICINE

## 2023-10-17 PROCEDURE — 3074F SYST BP LT 130 MM HG: CPT | Mod: HCNC,CPTII,S$GLB, | Performed by: INTERNAL MEDICINE

## 2023-10-17 PROCEDURE — 3008F BODY MASS INDEX DOCD: CPT | Mod: HCNC,CPTII,S$GLB, | Performed by: INTERNAL MEDICINE

## 2023-10-17 PROCEDURE — 1126F PR PAIN SEVERITY QUANTIFIED, NO PAIN PRESENT: ICD-10-PCS | Mod: HCNC,CPTII,S$GLB, | Performed by: INTERNAL MEDICINE

## 2023-10-17 RX ORDER — LEVOTHYROXINE SODIUM 112 UG/1
TABLET ORAL
Qty: 90 TABLET | Refills: 3 | Status: SHIPPED | OUTPATIENT
Start: 2023-10-17

## 2023-10-17 RX ORDER — PRAVASTATIN SODIUM 40 MG/1
TABLET ORAL
Qty: 90 TABLET | Refills: 3 | Status: SHIPPED | OUTPATIENT
Start: 2023-10-17

## 2023-10-17 RX ORDER — TRIAMTERENE AND HYDROCHLOROTHIAZIDE 37.5; 25 MG/1; MG/1
CAPSULE ORAL
Qty: 90 CAPSULE | Refills: 3 | Status: SHIPPED | OUTPATIENT
Start: 2023-10-17

## 2023-10-17 NOTE — PROGRESS NOTES
Subjective     Patient ID: Chelsie Foster is a 68 y.o. female.    Chief Complaint: Annual Exam    HPI  Doing well.    Rash returned after interval improvement with steroid injection..  Affects lower abdomen, behind L ear.  To see Derm again next week.  Allergic contact dermatitis suspected.  She is taking an anti-histamine at night.      Colonoscopy upcoming.    She has chronic episodic pain  lower abdomen, present for at least 6 years, when she had u/s..  She is eager to have colonoscpoy.    Nocturia.    Htn is controlled.    Hypothyroidism.    GERD controlled with protonix.    Occas lower back pain on Left, occas radiating down LLE>      Review of Systems   Constitutional:  Negative for fever and unexpected weight change.   HENT:  Negative for nasal congestion and postnasal drip.    Respiratory:  Negative for chest tightness, shortness of breath and wheezing.    Cardiovascular:  Negative for chest pain and leg swelling.   Gastrointestinal:  Negative for abdominal pain, anal bleeding, constipation, diarrhea, nausea and vomiting.   Genitourinary:  Negative for dysuria and urgency.   Integumentary:  Negative for rash.   Neurological:  Negative for headaches.   Psychiatric/Behavioral:  Negative for dysphoric mood and sleep disturbance. The patient is not nervous/anxious.           Objective     Physical Exam  Constitutional:       General: She is not in acute distress.     Appearance: She is well-developed.   HENT:      Head: Normocephalic and atraumatic.      Right Ear: External ear normal.      Left Ear: External ear normal.      Nose: Nose normal.   Eyes:      General: No scleral icterus.        Right eye: No discharge.         Left eye: No discharge.      Conjunctiva/sclera: Conjunctivae normal.      Pupils: Pupils are equal, round, and reactive to light.   Neck:      Thyroid: No thyromegaly.      Vascular: No JVD.   Cardiovascular:      Rate and Rhythm: Normal rate and regular rhythm.      Heart sounds: Murmur  (systolic) heard.      No gallop.   Pulmonary:      Effort: Pulmonary effort is normal. No respiratory distress.      Breath sounds: Normal breath sounds. No wheezing or rales.   Abdominal:      General: Bowel sounds are normal. There is no distension.      Palpations: Abdomen is soft. There is no mass.      Tenderness: There is no abdominal tenderness. There is no guarding or rebound.   Musculoskeletal:         General: No tenderness. Normal range of motion.      Cervical back: Normal range of motion and neck supple.   Lymphadenopathy:      Cervical: No cervical adenopathy.   Skin:     General: Skin is warm and dry.      Findings: No rash.      Comments: Excoriations behind L ear, l upper arm<< lower abdomen   Neurological:      Mental Status: She is alert and oriented to person, place, and time.      Cranial Nerves: No cranial nerve deficit.      Coordination: Coordination normal.   Psychiatric:         Behavior: Behavior normal.         Thought Content: Thought content normal.         Judgment: Judgment normal.       Results for orders placed or performed in visit on 10/11/23   TSH   Result Value Ref Range    TSH 0.524 0.400 - 4.000 uIU/mL   Comprehensive Metabolic Panel   Result Value Ref Range    Sodium 141 136 - 145 mmol/L    Potassium 3.6 3.5 - 5.1 mmol/L    Chloride 100 95 - 110 mmol/L    CO2 30 (H) 23 - 29 mmol/L    Glucose 99 70 - 110 mg/dL    BUN 16 8 - 23 mg/dL    Creatinine 0.8 0.5 - 1.4 mg/dL    Calcium 10.1 8.7 - 10.5 mg/dL    Total Protein 8.0 6.0 - 8.4 g/dL    Albumin 3.8 3.5 - 5.2 g/dL    Total Bilirubin 0.4 0.1 - 1.0 mg/dL    Alkaline Phosphatase 103 55 - 135 U/L    AST 20 10 - 40 U/L    ALT 25 10 - 44 U/L    eGFR >60.0 >60 mL/min/1.73 m^2    Anion Gap 11 8 - 16 mmol/L   CBC Auto Differential   Result Value Ref Range    WBC 8.45 3.90 - 12.70 K/uL    RBC 4.73 4.00 - 5.40 M/uL    Hemoglobin 13.2 12.0 - 16.0 g/dL    Hematocrit 41.5 37.0 - 48.5 %    MCV 88 82 - 98 fL    MCH 27.9 27.0 - 31.0 pg    MCHC  31.8 (L) 32.0 - 36.0 g/dL    RDW 14.6 (H) 11.5 - 14.5 %    Platelets 297 150 - 450 K/uL    MPV 10.6 9.2 - 12.9 fL    Immature Granulocytes 0.5 0.0 - 0.5 %    Gran # (ANC) 5.7 1.8 - 7.7 K/uL    Immature Grans (Abs) 0.04 0.00 - 0.04 K/uL    Lymph # 1.7 1.0 - 4.8 K/uL    Mono # 0.8 0.3 - 1.0 K/uL    Eos # 0.3 0.0 - 0.5 K/uL    Baso # 0.05 0.00 - 0.20 K/uL    nRBC 0 0 /100 WBC    Gran % 66.9 38.0 - 73.0 %    Lymph % 19.9 18.0 - 48.0 %    Mono % 8.9 4.0 - 15.0 %    Eosinophil % 3.2 0.0 - 8.0 %    Basophil % 0.6 0.0 - 1.9 %    Differential Method Automated    Lipid Panel   Result Value Ref Range    Cholesterol 194 120 - 199 mg/dL    Triglycerides 142 30 - 150 mg/dL    HDL 47 40 - 75 mg/dL    LDL Cholesterol 118.6 63.0 - 159.0 mg/dL    HDL/Cholesterol Ratio 24.2 20.0 - 50.0 %    Total Cholesterol/HDL Ratio 4.1 2.0 - 5.0    Non-HDL Cholesterol 147 mg/dL         Assessment and Plan     1. Annual physical exam  -     Ambulatory referral/consult to Gynecology; Future; Expected date: 10/24/2023    2. Hyperlipidemia, unspecified hyperlipidemia type  -     pravastatin (PRAVACHOL) 40 MG tablet; TAKE ONE TABLET BY MOUTH EVERY DAY  Dispense: 90 tablet; Refill: 3    3. Primary hypertension    4. Hypothyroidism due to acquired atrophy of thyroid  -     levothyroxine (SYNTHROID) 112 MCG tablet; TAKE 1 TABLET BY MOUTH ONCE DAILY  Dispense: 90 tablet; Refill: 3    5. Essential hypertension  -     triamterene-hydrochlorothiazide 37.5-25 mg (DYAZIDE) 37.5-25 mg per capsule; TAKE 1 CAPSULE BY MOUTH EVERY DAY.  Dispense: 90 capsule; Refill: 3    6. Chronic low back pain without sciatica, unspecified back pain laterality        PT offered.  Back exercises.   Exercise regularly.  Weight loss diet reviewed.    Declines covid vax.    Follow up in about 1 year (around 10/17/2024) for PE.

## 2023-10-23 ENCOUNTER — OFFICE VISIT (OUTPATIENT)
Dept: DERMATOLOGY | Facility: CLINIC | Age: 68
End: 2023-10-23
Payer: MEDICARE

## 2023-10-23 DIAGNOSIS — L23.9 ALLERGIC CONTACT DERMATITIS, UNSPECIFIED TRIGGER: Primary | ICD-10-CM

## 2023-10-23 PROCEDURE — 1126F AMNT PAIN NOTED NONE PRSNT: CPT | Mod: HCNC,CPTII,S$GLB, | Performed by: DERMATOLOGY

## 2023-10-23 PROCEDURE — 99999 PR PBB SHADOW E&M-EST. PATIENT-LVL III: ICD-10-PCS | Mod: PBBFAC,HCNC,, | Performed by: DERMATOLOGY

## 2023-10-23 PROCEDURE — 99214 OFFICE O/P EST MOD 30 MIN: CPT | Mod: HCNC,S$GLB,, | Performed by: DERMATOLOGY

## 2023-10-23 PROCEDURE — 1159F PR MEDICATION LIST DOCUMENTED IN MEDICAL RECORD: ICD-10-PCS | Mod: HCNC,CPTII,S$GLB, | Performed by: DERMATOLOGY

## 2023-10-23 PROCEDURE — 3288F FALL RISK ASSESSMENT DOCD: CPT | Mod: HCNC,CPTII,S$GLB, | Performed by: DERMATOLOGY

## 2023-10-23 PROCEDURE — 1101F PR PT FALLS ASSESS DOC 0-1 FALLS W/OUT INJ PAST YR: ICD-10-PCS | Mod: HCNC,CPTII,S$GLB, | Performed by: DERMATOLOGY

## 2023-10-23 PROCEDURE — 99214 PR OFFICE/OUTPT VISIT, EST, LEVL IV, 30-39 MIN: ICD-10-PCS | Mod: HCNC,S$GLB,, | Performed by: DERMATOLOGY

## 2023-10-23 PROCEDURE — 1126F PR PAIN SEVERITY QUANTIFIED, NO PAIN PRESENT: ICD-10-PCS | Mod: HCNC,CPTII,S$GLB, | Performed by: DERMATOLOGY

## 2023-10-23 PROCEDURE — 1101F PT FALLS ASSESS-DOCD LE1/YR: CPT | Mod: HCNC,CPTII,S$GLB, | Performed by: DERMATOLOGY

## 2023-10-23 PROCEDURE — 3288F PR FALLS RISK ASSESSMENT DOCUMENTED: ICD-10-PCS | Mod: HCNC,CPTII,S$GLB, | Performed by: DERMATOLOGY

## 2023-10-23 PROCEDURE — 1160F PR REVIEW ALL MEDS BY PRESCRIBER/CLIN PHARMACIST DOCUMENTED: ICD-10-PCS | Mod: HCNC,CPTII,S$GLB, | Performed by: DERMATOLOGY

## 2023-10-23 PROCEDURE — 99999 PR PBB SHADOW E&M-EST. PATIENT-LVL III: CPT | Mod: PBBFAC,HCNC,, | Performed by: DERMATOLOGY

## 2023-10-23 PROCEDURE — 1159F MED LIST DOCD IN RCRD: CPT | Mod: HCNC,CPTII,S$GLB, | Performed by: DERMATOLOGY

## 2023-10-23 PROCEDURE — 1160F RVW MEDS BY RX/DR IN RCRD: CPT | Mod: HCNC,CPTII,S$GLB, | Performed by: DERMATOLOGY

## 2023-10-23 NOTE — PATIENT INSTRUCTIONS
[facility]  Pre Patch Test Instructions    You have been scheduled for patch testing in the dermatology department. This process requires you keep three appointments in the same week. (Usually Monday, Wednesday and Friday)  Two weeks prior to testing; sun exposure on your back should be avoided. Patches cannot be placed on pink, red or peeling skin.  Female patients are asked to wear two piece outfits for convenience when being tested. Also, front hooking bras are easier to use for the week, but not a necessity.  Patients should not have any powder, lotions or ointments on their back the day of testing.  During the week of testing you must avoid showers. Only tub bathing in shallow water is allowed. We ask that you not wash your hair so there is no chance of getting the test site wet. All activities must be limited this week on order to stay dry and cool the entire week. Work schedules may need to be arranged.  Male patients may need to have their back shaved prior to testing. This is to be done in the office if needed.  The allergens to be placed are sometimes messy. Please wear garments that can be easily laundered.    Please call or office if you have any questions about your testing. [phone]

## 2023-10-23 NOTE — PROGRESS NOTES
Subjective:      Patient ID:  Chelsie Foster is a 68 y.o. female who presents for   Chief Complaint   Patient presents with    Rash     Diffuse     Thought TAC oint made skin burn.    Rash - Initial  Affected locations: abdomen, back and neck  Duration: 7 months  Signs / symptoms: dryness, itching, rough and scabs  Severity: mild to moderate  Timing: intermittent and no history of same complaint (Waxes and wanes)  Aggravated by: nothing  Treatments tried: using Rx cream from PCP ( pt visited on acute derm clinic on 06/21/2023)      Review of Systems   Constitutional:  Negative for fever, chills and fatigue.   Skin:  Positive for itching, rash and dry skin.   Allergic/Immunologic: Negative for environmental allergies (takes antihis qoday).       Objective:   Physical Exam   Constitutional: She appears well-developed and well-nourished. No distress.   Neurological: She is alert and oriented to person, place, and time. She is not disoriented.   Psychiatric: She has a normal mood and affect.   Skin:   Areas Examined (abnormalities noted in diagram):   Head / Face Inspection Performed  Neck Inspection Performed  Chest / Axilla Inspection Performed  Abdomen Inspection Performed  Back Inspection Performed  RUE Inspected  LUE Inspection Performed                 Diagram Legend     Erythematous scaling macule/papule c/w actinic keratosis       Vascular papule c/w angioma      Pigmented verrucoid papule/plaque c/w seborrheic keratosis      Yellow umbilicated papule c/w sebaceous hyperplasia      Irregularly shaped tan macule c/w lentigo     1-2 mm smooth white papules consistent with Milia      Movable subcutaneous cyst with punctum c/w epidermal inclusion cyst      Subcutaneous movable cyst c/w pilar cyst      Firm pink to brown papule c/w dermatofibroma      Pedunculated fleshy papule(s) c/w skin tag(s)      Evenly pigmented macule c/w junctional nevus     Mildly variegated pigmented, slightly irregular-bordered macule  c/w mildly atypical nevus      Flesh colored to evenly pigmented papule c/w intradermal nevus       Pink pearly papule/plaque c/w basal cell carcinoma      Erythematous hyperkeratotic cursted plaque c/w SCC      Surgical scar with no sign of skin cancer recurrence      Open and closed comedones      Inflammatory papules and pustules      Verrucoid papule consistent consistent with wart     Erythematous eczematous patches and plaques     Dystrophic onycholytic nail with subungual debris c/w onychomycosis     Umbilicated papule    Erythematous-base heme-crusted tan verrucoid plaque consistent with inflamed seborrheic keratosis     Erythematous Silvery Scaling Plaque c/w Psoriasis     See annotation      Assessment / Plan:        R/O Allergic contact dermatitis, unspecified trigger  -     Patch Testing; Future  Scabies prep negative    D/c TAC cream and start Lidex ointment bid to AA upper back and lower abdomen    H/o nmsc  Area(s) of previous NMSC evaluated with no signs of recurrence.    Upper body skin examination performed today including at least 6 points as noted in physical examination. No lesions suspicious for malignancy noted.    Recommend daily sun protection/avoidance and use of at least SPF 30, broad spectrum sunscreen (OTC drug).                Follow up for patch testing.

## 2023-10-24 ENCOUNTER — PATIENT MESSAGE (OUTPATIENT)
Dept: INTERNAL MEDICINE | Facility: CLINIC | Age: 68
End: 2023-10-24
Payer: MEDICARE

## 2023-11-01 ENCOUNTER — HOSPITAL ENCOUNTER (OUTPATIENT)
Dept: RADIOLOGY | Facility: CLINIC | Age: 68
Discharge: HOME | End: 2023-11-01
Attending: INTERNAL MEDICINE
Payer: MEDICARE

## 2023-11-01 DIAGNOSIS — Z78.0 MENOPAUSE: ICD-10-CM

## 2023-11-01 PROCEDURE — 77080 DXA BONE DENSITY AXIAL: CPT | Mod: TC,HCNC

## 2023-11-01 PROCEDURE — 77080 DXA BONE DENSITY AXIAL: CPT | Mod: 26,HCNC,, | Performed by: INTERNAL MEDICINE

## 2023-11-01 PROCEDURE — 77080 DXA BONE DENSITY AXIAL SKELETON 1 OR MORE SITES: ICD-10-PCS | Mod: 26,HCNC,, | Performed by: INTERNAL MEDICINE

## 2023-11-13 ENCOUNTER — CLINICAL SUPPORT (OUTPATIENT)
Dept: DERMATOLOGY | Facility: CLINIC | Age: 68
End: 2023-11-13
Payer: MEDICARE

## 2023-11-13 DIAGNOSIS — L23.9 ALLERGIC CONTACT DERMATITIS, UNSPECIFIED TRIGGER: Primary | ICD-10-CM

## 2023-11-13 PROCEDURE — 95044 PATCH/APPLICATION TESTS: CPT | Mod: HCNC,S$GLB,, | Performed by: DERMATOLOGY

## 2023-11-13 PROCEDURE — 95044 PR ALLERGY PATCH TESTS: ICD-10-PCS | Mod: HCNC,S$GLB,, | Performed by: DERMATOLOGY

## 2023-11-13 NOTE — PROGRESS NOTES
The True Test and an additional 10 allergens were placed on 11/13/23 to the patient's posterior thorax. 50 Allergens were applied for the diagnoses of Allergic contact dermatitis, unspecified trigger. ( ICD-10 code : L23.9 ). The patient is to follow up Wednesday 11/15/23 for first Patch Reading and Friday 11/17/23 for second Patch Reading.

## 2023-11-13 NOTE — PATIENT INSTRUCTIONS
[facility]  Pre Patch Test Instructions    You have been scheduled for patch testing in the dermatology department. This process requires you keep three appointments in the same week. (Usually Monday, Wednesday and Friday)  Two weeks prior to testing; sun exposure on your back should be avoided. Patches cannot be placed on pink, red or peeling skin.  Female patients are asked to wear two piece outfits for convenience when being tested. Also, front hooking bras are easier to use for the week, but not a necessity.  Patients should not have any powder, lotions or ointments on their back the day of testing.  During the week of testing you must avoid showers. Only tub bathing in shallow water is allowed. We ask that you not wash your hair so there is no chance of getting the test site wet. All activities must be limited this week on order to stay dry and cool the entire week. Work schedules may need to be arranged.  Male patients may need to have their back shaved prior to testing. This is to be done in the office if needed.  The allergens to be placed are sometimes messy. Please wear garments that can be easily laundered.    Please call or office if you have any questions about your testing. [phone]

## 2023-11-15 ENCOUNTER — OFFICE VISIT (OUTPATIENT)
Dept: DERMATOLOGY | Facility: CLINIC | Age: 68
End: 2023-11-15
Payer: MEDICARE

## 2023-11-15 DIAGNOSIS — L23.9 ALLERGIC CONTACT DERMATITIS, UNSPECIFIED TRIGGER: Primary | ICD-10-CM

## 2023-11-15 PROCEDURE — 99212 OFFICE O/P EST SF 10 MIN: CPT | Mod: HCNC,S$GLB,, | Performed by: DERMATOLOGY

## 2023-11-15 PROCEDURE — 1160F PR REVIEW ALL MEDS BY PRESCRIBER/CLIN PHARMACIST DOCUMENTED: ICD-10-PCS | Mod: HCNC,CPTII,S$GLB, | Performed by: DERMATOLOGY

## 2023-11-15 PROCEDURE — 99999 PR PBB SHADOW E&M-EST. PATIENT-LVL I: ICD-10-PCS | Mod: PBBFAC,HCNC,, | Performed by: DERMATOLOGY

## 2023-11-15 PROCEDURE — 1159F MED LIST DOCD IN RCRD: CPT | Mod: HCNC,CPTII,S$GLB, | Performed by: DERMATOLOGY

## 2023-11-15 PROCEDURE — 99212 PR OFFICE/OUTPT VISIT, EST, LEVL II, 10-19 MIN: ICD-10-PCS | Mod: HCNC,S$GLB,, | Performed by: DERMATOLOGY

## 2023-11-15 PROCEDURE — 99999 PR PBB SHADOW E&M-EST. PATIENT-LVL I: CPT | Mod: PBBFAC,HCNC,, | Performed by: DERMATOLOGY

## 2023-11-15 PROCEDURE — 1159F PR MEDICATION LIST DOCUMENTED IN MEDICAL RECORD: ICD-10-PCS | Mod: HCNC,CPTII,S$GLB, | Performed by: DERMATOLOGY

## 2023-11-15 PROCEDURE — 1160F RVW MEDS BY RX/DR IN RCRD: CPT | Mod: HCNC,CPTII,S$GLB, | Performed by: DERMATOLOGY

## 2023-11-15 NOTE — PROGRESS NOTES
Patient here for 1st patch test reading.    HPI:  Pt presented to me on 10/23/23 with a 7 month h/o dry, itchy rash on abdomen, back and neck that was intermittent but no prev h/o same.   PE on 10/23/23 was c/w excoriations upper back and abdomen    PE: currently clear    IMP/PLAN:  R/O Allergic Contact Dermatitis    All negative    F/u: 2 days for final reading

## 2023-11-17 ENCOUNTER — OFFICE VISIT (OUTPATIENT)
Dept: DERMATOLOGY | Facility: CLINIC | Age: 68
End: 2023-11-17
Payer: MEDICARE

## 2023-11-17 DIAGNOSIS — L30.9 ECZEMA, UNSPECIFIED TYPE: Primary | ICD-10-CM

## 2023-11-17 PROCEDURE — 99214 OFFICE O/P EST MOD 30 MIN: CPT | Mod: HCNC,S$GLB,, | Performed by: DERMATOLOGY

## 2023-11-17 PROCEDURE — 99999 PR PBB SHADOW E&M-EST. PATIENT-LVL I: ICD-10-PCS | Mod: PBBFAC,HCNC,, | Performed by: DERMATOLOGY

## 2023-11-17 PROCEDURE — 1160F RVW MEDS BY RX/DR IN RCRD: CPT | Mod: HCNC,CPTII,S$GLB, | Performed by: DERMATOLOGY

## 2023-11-17 PROCEDURE — 99999 PR PBB SHADOW E&M-EST. PATIENT-LVL I: CPT | Mod: PBBFAC,HCNC,, | Performed by: DERMATOLOGY

## 2023-11-17 PROCEDURE — 1159F MED LIST DOCD IN RCRD: CPT | Mod: HCNC,CPTII,S$GLB, | Performed by: DERMATOLOGY

## 2023-11-17 PROCEDURE — 99214 PR OFFICE/OUTPT VISIT, EST, LEVL IV, 30-39 MIN: ICD-10-PCS | Mod: HCNC,S$GLB,, | Performed by: DERMATOLOGY

## 2023-11-17 PROCEDURE — 1160F PR REVIEW ALL MEDS BY PRESCRIBER/CLIN PHARMACIST DOCUMENTED: ICD-10-PCS | Mod: HCNC,CPTII,S$GLB, | Performed by: DERMATOLOGY

## 2023-11-17 PROCEDURE — 1159F PR MEDICATION LIST DOCUMENTED IN MEDICAL RECORD: ICD-10-PCS | Mod: HCNC,CPTII,S$GLB, | Performed by: DERMATOLOGY

## 2023-11-17 RX ORDER — BETAMETHASONE DIPROPIONATE 0.5 MG/G
CREAM TOPICAL 2 TIMES DAILY
Qty: 50 G | Refills: 3 | Status: SHIPPED | OUTPATIENT
Start: 2023-11-17

## 2023-11-17 NOTE — PROGRESS NOTES
Patient here for 1st patch test reading.     HPI:  Pt presented to me on 10/23/23 with a 7 month h/o dry, itchy rash on abdomen, back and neck that was intermittent but no prev h/o same.   PE on 10/23/23 was c/w excoriations upper back and abdomen     PE: erythematous eczematous plaque left lower neck/chest     IMP/PLAN:  R/O Allergic Contact Dermatitis - All negative    Eczematous dermatitis - unclear etiology  No h/o AD  Sent Rx for Diprolene AF cream - to use bid  Ok to take otc antihistamines     F/u: 3 months

## 2023-11-27 ENCOUNTER — TELEPHONE (OUTPATIENT)
Dept: GASTROENTEROLOGY | Facility: CLINIC | Age: 68
End: 2023-11-27
Payer: MEDICARE

## 2023-11-27 NOTE — TELEPHONE ENCOUNTER
----- Message from Anita Reid sent at 11/27/2023  3:52 PM CST -----  Regarding: Appt Access  Contact: [phone]  RESCELIANULE/IRINA    Current Appt Date: 12/07/2023    Type of Appt:  Colonoscopy    Physician: Abhishek    Reason for Scheduling: Pt would like to r/s.  Please call.     Caller: YOHAN CASTANO [[id number]]    Contact Preference:  [phone]

## 2023-11-30 ENCOUNTER — TELEPHONE (OUTPATIENT)
Dept: ENDOSCOPY | Facility: HOSPITAL | Age: 68
End: 2023-11-30
Payer: MEDICARE

## 2023-11-30 NOTE — TELEPHONE ENCOUNTER
Contacted Pt for pre-call for upcoming colonoscopy on 12/7/23 with Dr. Smith, Pt requested to reschedule for a later date in February 2024.    Spoke to Pt to reschedule procedure(s) Colonoscopy       Physician to perform procedure(s) Dr. ODILIA Smith  Date of Procedure (s) 2/1/24  Arrival Time 7:45 AM  Time of Procedure(s) 8:45 AM   Location of Procedure(s) [place] [address]nd Floor  Type of Rx Prep sent to patient: PEG  Instructions provided to patient via [facility]    Patient was informed on the following information and verbalized understanding. Screening questionnaire reviewed with patient and complete. If procedure requires anesthesia, a responsible adult needs to be present to accompany the patient home, patient cannot drive after receiving anesthesia. Appointment details are tentative, especially check-in time. Patient will receive a prep-op call 7 days prior to confirm check-in time for procedure. If applicable the patient should contact their pharmacy to verify Rx for procedure prep is ready for pick-up. Patient was advised to call the scheduling department at [phone] if pharmacy states no Rx is available. Patient was advised to call the endoscopy scheduling department if any questions or concerns arise.      [facility] Scheduling Department

## 2023-12-13 DIAGNOSIS — I10 ESSENTIAL HYPERTENSION: ICD-10-CM

## 2023-12-13 RX ORDER — ATENOLOL 25 MG/1
25 TABLET ORAL DAILY
Qty: 90 TABLET | Refills: 3 | Status: SHIPPED | OUTPATIENT
Start: 2023-12-13

## 2023-12-13 NOTE — TELEPHONE ENCOUNTER
Refill Decision Note   Chelsie Foster  is requesting a refill authorization.  Brief Assessment and Rationale for Refill:  Approve     Medication Therapy Plan:         Comments:     Note composed:8:44 AM 12/13/2023             Appointments     Last Visit   10/17/2023 Christi Carrillo MD   Next Visit   Visit date not found Christi Carrillo MD

## 2023-12-13 NOTE — TELEPHONE ENCOUNTER
No care due was identified.  [facility] Embedded Care Due Messages. Reference number: [id number].   12/13/2023 8:26:22 AM CST

## 2024-01-19 ENCOUNTER — OFFICE VISIT (OUTPATIENT)
Dept: OBSTETRICS AND GYNECOLOGY | Facility: CLINIC | Age: 69
End: 2024-01-19
Attending: STUDENT IN AN ORGANIZED HEALTH CARE EDUCATION/TRAINING PROGRAM
Payer: MEDICARE

## 2024-01-19 VITALS
SYSTOLIC BLOOD PRESSURE: 140 MMHG | DIASTOLIC BLOOD PRESSURE: 88 MMHG | HEIGHT: 66 IN | WEIGHT: 164.13 LBS | BODY MASS INDEX: 26.38 KG/M2

## 2024-01-19 DIAGNOSIS — Z12.31 BREAST CANCER SCREENING BY MAMMOGRAM: ICD-10-CM

## 2024-01-19 DIAGNOSIS — Z01.419 WELL WOMAN EXAM WITH ROUTINE GYNECOLOGICAL EXAM: Primary | ICD-10-CM

## 2024-01-19 DIAGNOSIS — Z00.00 ANNUAL PHYSICAL EXAM: ICD-10-CM

## 2024-01-19 PROCEDURE — 99397 PER PM REEVAL EST PAT 65+ YR: CPT | Mod: S$GLB,,, | Performed by: STUDENT IN AN ORGANIZED HEALTH CARE EDUCATION/TRAINING PROGRAM

## 2024-01-19 NOTE — PROGRESS NOTES
History & Physical  Gynecology      SUBJECTIVE:     Chief Complaint: Well Woman       History of Present Illness:  Annual exam. No complaints  Menstrual History: menopausal since age 50s. No PMB. No HRT.  Sexually Active: in last twelve months none  Family history: below  Social: Wears seatbelts. Exercises no. Feels safe at home. No severe depressive episodes recently  Last pap:  normal  Last mammo: 2023  Colonoscopy: scheduled  DEXA: 2023 Osteopenia  Flu:yes  Covid vaccine no  Vitamins: off and on        Review of patient's allergies indicates:   Allergen Reactions    No known drug allergies        Past Medical History:   Diagnosis Date    Anxiety     Basal cell carcinoma of nose 2023    nasal bridge    Breast cancer 2015    Right breast infiltrating ductal CA, ER/IN/Her2 negative    Depression     Genetic testing     see clinic notes 5-19-15 for details, VUS in [place]    GERD (gastroesophageal reflux disease)     Hyperlipidemia     Hypertension     Hypothyroidism     PONV (postoperative nausea and vomiting)     Thyroid disease     hypothryoidism     Past Surgical History:   Procedure Laterality Date    APPENDECTOMY      BREAST BIOPSY Right 2015    IDC     SECTION      COLONOSCOPY N/A 2016    Procedure: COLONOSCOPY;  Surgeon: YULIYA Sherman MD;  Location: [address];  Service: Endoscopy;  Laterality: N/A;     OB History          3    Para   2    Term   1            AB   1    Living   1         SAB   1    IAB        Ectopic        Multiple        Live Births   1               Family History   Problem Relation Age of Onset    Hypertension Mother     Hyperlipidemia Mother     Breast cancer Mother 60    Cancer Mother         Basal cell skin CA, lymphoma    Depression Mother     Colon cancer Father     Dementia Father     Heart disease Father         chf    Cancer Father         colon CA and Basal Cell skin CA    Hypertension Sister     Diabetes Sister      Hypertension Sister     Bipolar disorder Son     Mental illness Son         bipolar/schizoaffective    Diabetes Son     Breast cancer Maternal Aunt 50    Cancer Maternal Aunt         Colon CA    Cancer Maternal Cousin     Breast cancer Cousin 49        maternal    Amblyopia Neg Hx     Blindness Neg Hx     Cataracts Neg Hx     Glaucoma Neg Hx     Macular degeneration Neg Hx     Retinal detachment Neg Hx     Strabismus Neg Hx     Stroke Neg Hx     Thyroid disease Neg Hx     Ovarian cancer Neg Hx     Melanoma Neg Hx      Social History     Tobacco Use    Smoking status: Never    Smokeless tobacco: Never   Substance Use Topics    Alcohol use: Yes     Comment: rarely    Drug use: No       Current Outpatient Medications   Medication Sig    atenoloL (TENORMIN) 25 MG tablet Take 1 tablet (25 mg total) by mouth once daily.    augmented betamethasone dipropionate (DIPROLENE-AF) 0.05 % cream Apply topically 2 (two) times daily.    fluocinonide (LIDEX) 0.05 % ointment Apply topically 2 (two) times daily to rash    fluticasone propionate (FLONASE) 50 mcg/actuation nasal spray 1 spray (50 mcg total) by Each Nostril route 2 (two) times a day.    HYDROcodone-acetaminophen (NORCO) 5-325 mg per tablet Take 1 tablet by mouth every 6 (six) hours as needed for Pain.    levothyroxine (SYNTHROID) 112 MCG tablet TAKE 1 TABLET BY MOUTH ONCE DAILY    LORazepam (ATIVAN) 1 MG tablet TAKE 1/2 TO 1 (ONE-HALF TO ONE) TABLET BY MOUTH IN THE EVENING    pantoprazole (PROTONIX) 40 MG tablet TAKE 1 TABLET BY MOUTH EVERY DAY    pravastatin (PRAVACHOL) 40 MG tablet TAKE ONE TABLET BY MOUTH EVERY DAY    triamcinolone acetonide 0.1% (KENALOG) 0.1 % cream Apply topically 2 (two) times daily.    triamcinolone acetonide 0.1% (KENALOG) 0.1 % ointment Apply to affected areas of rash twice a day for 2-4 weeks at a time as needed for rash.    triamterene-hydrochlorothiazide 37.5-25 mg (DYAZIDE) 37.5-25 mg per capsule TAKE 1 CAPSULE BY MOUTH EVERY DAY.     No  current facility-administered medications for this visit.         Review of Systems:  Review of Systems   Constitutional:  Negative for activity change, appetite change, fever and unexpected weight change.   Respiratory:  Negative for shortness of breath.    Cardiovascular:  Negative for chest pain.   Gastrointestinal:  Negative for abdominal pain, blood in stool, constipation, diarrhea, nausea and vomiting.   Endocrine: Negative for hot flashes.   Genitourinary:  Negative for dyspareunia, dysuria, frequency, hematuria, hot flashes, pelvic pain, urgency, vaginal bleeding, vaginal discharge, vaginal pain, urinary incontinence, postcoital bleeding, postmenopausal bleeding and vaginal dryness.   Integumentary:  Negative for breast mass.   Psychiatric/Behavioral:  Negative for sleep disturbance.    Breast: Negative for lump and mass       OBJECTIVE:     Physical Exam:  Physical Exam  Vitals reviewed.   Constitutional:       General: She is not in acute distress.     Appearance: She is well-developed. She is not diaphoretic.   HENT:      Head: Normocephalic and atraumatic.   Eyes:      General: No scleral icterus.        Right eye: No discharge.         Left eye: No discharge.      Conjunctiva/sclera: Conjunctivae normal.   Neck:      Thyroid: No thyromegaly.   Cardiovascular:      Rate and Rhythm: Normal rate.   Pulmonary:      Effort: Pulmonary effort is normal.   Chest:   Breasts:     Breasts are symmetrical.      Right: No inverted nipple, mass, nipple discharge, skin change or tenderness.      Left: No inverted nipple, mass, nipple discharge, skin change or tenderness.   Abdominal:      General: There is no distension.      Palpations: Abdomen is soft.      Tenderness: There is no abdominal tenderness.   Genitourinary:     Labia:         Right: No rash, tenderness, lesion or injury.         Left: No rash, tenderness, lesion or injury.       Vagina: Normal. No signs of injury and foreign body. No vaginal discharge,  erythema, tenderness or bleeding.      Cervix: No cervical motion tenderness, discharge or friability.      Uterus: Not deviated, not enlarged, not fixed and not tender.       Adnexa:         Right: No mass, tenderness or fullness.          Left: No mass, tenderness or fullness.     Musculoskeletal:         General: Normal range of motion.      Cervical back: Normal range of motion and neck supple.   Lymphadenopathy:      Cervical: No cervical adenopathy.   Skin:     General: Skin is warm and dry.      Findings: No erythema or rash.   Neurological:      Mental Status: She is alert and oriented to person, place, and time.           ASSESSMENT:       ICD-10-CM ICD-9-CM    1. Annual physical exam  Z00.00 V70.0 Ambulatory referral/consult to Gynecology             Plan:      WWE  - Postmenopausal. No issues.  - Vaccines utd  - Labs utd  - DEXA, Colonoscopy utd  - Mammogram scheduled  - Adequate screening with pap smear has been normal. Discussed with patient that she no longer requires screening based on ASCCP guidelines.   - Vitamin D and Calcium recs given  - CBE normal. Physical exam normal. VSS     Counseling time: 15 minutes    Maria D Weber

## 2024-01-24 ENCOUNTER — PATIENT MESSAGE (OUTPATIENT)
Dept: ENDOSCOPY | Facility: HOSPITAL | Age: 69
End: 2024-01-24
Payer: MEDICARE

## 2024-01-30 ENCOUNTER — TELEPHONE (OUTPATIENT)
Dept: GASTROENTEROLOGY | Facility: CLINIC | Age: 69
End: 2024-01-30
Payer: MEDICARE

## 2024-01-30 NOTE — TELEPHONE ENCOUNTER
----- Message from Stephan Niño sent at 1/30/2024  2:47 PM CST -----  Regarding: Call back requested  Contact: [phone] Opt 2  Hi, pt called  to request a call back to discuss upcoming procedure.  Thank you.

## 2024-02-01 ENCOUNTER — ANESTHESIA (OUTPATIENT)
Dept: ENDOSCOPY | Facility: HOSPITAL | Age: 69
End: 2024-02-01
Payer: MEDICARE

## 2024-02-01 ENCOUNTER — HOSPITAL ENCOUNTER (OUTPATIENT)
Facility: HOSPITAL | Age: 69
Discharge: HOME | End: 2024-02-01
Attending: INTERNAL MEDICINE | Admitting: INTERNAL MEDICINE
Payer: MEDICARE

## 2024-02-01 ENCOUNTER — ANESTHESIA EVENT (OUTPATIENT)
Dept: ENDOSCOPY | Facility: HOSPITAL | Age: 69
End: 2024-02-01
Payer: MEDICARE

## 2024-02-01 VITALS
HEIGHT: 66 IN | DIASTOLIC BLOOD PRESSURE: 68 MMHG | BODY MASS INDEX: 26.52 KG/M2 | OXYGEN SATURATION: 97 % | RESPIRATION RATE: 18 BRPM | WEIGHT: 165 LBS | TEMPERATURE: 97 F | SYSTOLIC BLOOD PRESSURE: 132 MMHG | HEART RATE: 59 BPM

## 2024-02-01 DIAGNOSIS — Z12.11 ENCOUNTER FOR SCREENING COLONOSCOPY: ICD-10-CM

## 2024-02-01 PROCEDURE — D9220A PRA ANESTHESIA: Mod: PT,HCNC,ANES, | Performed by: ANESTHESIOLOGY

## 2024-02-01 PROCEDURE — 37000009 HC ANESTHESIA EA ADD 15 MINS: Mod: HCNC | Performed by: INTERNAL MEDICINE

## 2024-02-01 PROCEDURE — 45385 COLONOSCOPY W/LESION REMOVAL: CPT | Mod: PT,HCNC | Performed by: INTERNAL MEDICINE

## 2024-02-01 PROCEDURE — 88305 TISSUE EXAM BY PATHOLOGIST: CPT | Mod: 26,HCNC,, | Performed by: PATHOLOGY

## 2024-02-01 PROCEDURE — D9220A PRA ANESTHESIA: Mod: PT,HCNC,CRNA, | Performed by: NURSE ANESTHETIST, CERTIFIED REGISTERED

## 2024-02-01 PROCEDURE — 63600175 PHARM REV CODE 636 W HCPCS: Mod: HCNC | Performed by: NURSE ANESTHETIST, CERTIFIED REGISTERED

## 2024-02-01 PROCEDURE — 27201089 HC SNARE, DISP (ANY): Mod: HCNC | Performed by: INTERNAL MEDICINE

## 2024-02-01 PROCEDURE — 88305 TISSUE EXAM BY PATHOLOGIST: CPT | Mod: HCNC | Performed by: PATHOLOGY

## 2024-02-01 PROCEDURE — 45385 COLONOSCOPY W/LESION REMOVAL: CPT | Mod: PT,HCNC,GC, | Performed by: INTERNAL MEDICINE

## 2024-02-01 PROCEDURE — 37000008 HC ANESTHESIA 1ST 15 MINUTES: Mod: HCNC | Performed by: INTERNAL MEDICINE

## 2024-02-01 PROCEDURE — 25000003 PHARM REV CODE 250: Mod: HCNC | Performed by: NURSE ANESTHETIST, CERTIFIED REGISTERED

## 2024-02-01 RX ORDER — ONDANSETRON HYDROCHLORIDE 2 MG/ML
4 INJECTION, SOLUTION INTRAVENOUS DAILY PRN
Status: DISCONTINUED | OUTPATIENT
Start: 2024-02-01 | End: 2024-02-01 | Stop reason: HOSPADM

## 2024-02-01 RX ORDER — SODIUM CHLORIDE 9 MG/ML
INJECTION, SOLUTION INTRAVENOUS CONTINUOUS
Status: DISCONTINUED | OUTPATIENT
Start: 2024-02-01 | End: 2024-02-01 | Stop reason: HOSPADM

## 2024-02-01 RX ORDER — LORAZEPAM 2 MG/ML
0.25 INJECTION INTRAMUSCULAR ONCE AS NEEDED
Status: DISCONTINUED | OUTPATIENT
Start: 2024-02-01 | End: 2024-02-01 | Stop reason: HOSPADM

## 2024-02-01 RX ORDER — PROPOFOL 10 MG/ML
VIAL (ML) INTRAVENOUS
Status: DISCONTINUED | OUTPATIENT
Start: 2024-02-01 | End: 2024-02-01

## 2024-02-01 RX ORDER — MEPERIDINE HYDROCHLORIDE 50 MG/ML
12.5 INJECTION INTRAMUSCULAR; INTRAVENOUS; SUBCUTANEOUS ONCE AS NEEDED
Status: DISCONTINUED | OUTPATIENT
Start: 2024-02-01 | End: 2024-02-01 | Stop reason: HOSPADM

## 2024-02-01 RX ORDER — ONDANSETRON HYDROCHLORIDE 2 MG/ML
INJECTION, SOLUTION INTRAVENOUS
Status: DISCONTINUED | OUTPATIENT
Start: 2024-02-01 | End: 2024-02-01

## 2024-02-01 RX ORDER — LIDOCAINE HYDROCHLORIDE 20 MG/ML
INJECTION INTRAVENOUS
Status: DISCONTINUED | OUTPATIENT
Start: 2024-02-01 | End: 2024-02-01

## 2024-02-01 RX ORDER — DEXAMETHASONE SODIUM PHOSPHATE 4 MG/ML
INJECTION, SOLUTION INTRA-ARTICULAR; INTRALESIONAL; INTRAMUSCULAR; INTRAVENOUS; SOFT TISSUE
Status: DISCONTINUED | OUTPATIENT
Start: 2024-02-01 | End: 2024-02-01

## 2024-02-01 RX ORDER — HYDROMORPHONE HYDROCHLORIDE 1 MG/ML
0.2 INJECTION, SOLUTION INTRAMUSCULAR; INTRAVENOUS; SUBCUTANEOUS EVERY 5 MIN PRN
Status: DISCONTINUED | OUTPATIENT
Start: 2024-02-01 | End: 2024-02-01 | Stop reason: HOSPADM

## 2024-02-01 RX ADMIN — GLYCOPYRROLATE 0.2 MG: 0.2 INJECTION, SOLUTION INTRAMUSCULAR; INTRAVENOUS at 09:02

## 2024-02-01 RX ADMIN — SODIUM CHLORIDE: 9 INJECTION, SOLUTION INTRAVENOUS at 09:02

## 2024-02-01 RX ADMIN — DEXAMETHASONE SODIUM PHOSPHATE 4 MG: 4 INJECTION, SOLUTION INTRAMUSCULAR; INTRAVENOUS at 09:02

## 2024-02-01 RX ADMIN — LIDOCAINE HYDROCHLORIDE 100 MG: 20 INJECTION INTRAVENOUS at 09:02

## 2024-02-01 RX ADMIN — PROPOFOL 50 MG: 10 INJECTION, EMULSION INTRAVENOUS at 09:02

## 2024-02-01 RX ADMIN — ONDANSETRON 4 MG: 2 INJECTION INTRAMUSCULAR; INTRAVENOUS at 09:02

## 2024-02-01 NOTE — TRANSFER OF CARE
"Anesthesia Transfer of Care Note    Patient: Chelsie Foster    Procedure(s) Performed: Procedure(s) (LRB):  COLONOSCOPY (N/A)    Patient location: [facility]    Anesthesia Type: general    Transport from OR: Transported from OR on 6-10 L/min O2 by face mask with adequate spontaneous ventilation    Post pain: adequate analgesia    Post assessment: no apparent anesthetic complications and tolerated procedure well    Post vital signs: stable    Level of consciousness: awake    Nausea/Vomiting: no nausea/vomiting    Complications: none    Transfer of care protocol was followed    Last vitals: Visit Vitals  BP (!) 132/91 (BP Location: Left arm, Patient Position: Lying)   Pulse 72   Temp 35.9 °C (96.6 °F)   Resp 16   Ht 5' 6" (1.676 m)   Wt 74.8 kg (165 lb)   LMP  (LMP Unknown)   SpO2 97%   Breastfeeding No   BMI 26.63 kg/m²     "

## 2024-02-01 NOTE — CARE UPDATE
Patient being discharged to home via wheelchair, escorted by her sister, Yaneth. Pt alert and talkative, vitals stable on room air, tolerating PO intake. Discharge instructions (written and verbal) and follow-up information given to patient who verbalized understanding, as well as a readiness for discharge. C contact info provided for additional questions following discharge.

## 2024-02-01 NOTE — [employer] PATIENT INSTRUCTIONS
Discharge Summary/Instructions after an Endoscopic Procedure  Patient Name: Chelsie Foster  Patient MRN: [id number]  Patient YOB: 1955  Thursday, February 1, 2024  Ulices Smith MD  Dear patient,  As a result of recent federal legislation (The Federal Cures Act), you may   receive lab or pathology results from your procedure in your [facility]   account before your physician is able to contact you. Your physician or   their representative will relay the results to you with their   recommendations at their soonest availability.  Thank you,  RESTRICTIONS:  During your procedure today, you received medications for sedation.  These   medications may affect your judgment, balance and coordination.  Therefore,   for 24 hours, you have the following restrictions:   - DO NOT drive a car, operate machinery, make legal/financial decisions,   sign important papers or drink alcohol.    ACTIVITY:  Today: no heavy lifting, straining or running due to procedural   sedation/anesthesia.  The following day: return to full activity including work.  DIET:  Eat and drink normally unless instructed otherwise.     TREATMENT FOR COMMON SIDE EFFECTS:  - Mild abdominal pain, nausea, belching, bloating or excessive gas:  rest,   eat lightly and use a heating pad.  - Sore Throat: treat with throat lozenges and/or gargle with warm salt   water.  - Because air was used during the procedure, expelling large amounts of air   from your rectum or belching is normal.  - If a bowel prep was taken, you may not have a bowel movement for 1-3 days.    This is normal.  SYMPTOMS TO WATCH FOR AND REPORT TO YOUR PHYSICIAN:  1. Abdominal pain or bloating, other than gas cramps.  2. Chest pain.  3. Back pain.  4. Signs of infection such as: chills or fever occurring within 24 hours   after the procedure.  5. Rectal bleeding, which would show as bright red, maroon, or black stools.   (A tablespoon of blood from the rectum is not serious, especially  if   hemorrhoids are present.)  6. Vomiting.  7. Weakness or dizziness.  GO DIRECTLY TO THE NEAREST EMERGENCY ROOM IF YOU HAVE ANY OF THE FOLLOWING:      Difficulty breathing              Chills and/or fever over 101 F   Persistent vomiting and/or vomiting blood   Severe abdominal pain   Severe chest pain   Black, tarry stools   Bleeding- more than one tablespoon   Any other symptom or condition that you feel may need urgent attention  Your doctor recommends these additional instructions:  If any biopsies were taken, your doctors clinic will contact you in 1 to 2   weeks with any results.  - Patient has a contact number available for emergencies.  The signs and   symptoms of potential delayed complications were discussed with the   patient.  Return to normal activities tomorrow.  Written discharge   instructions were provided to the patient.   - Resume previous diet.   - Continue present medications.   - Await pathology results.   - Repeat colonoscopy in 5 years for surveillance.   - Discharge patient to home.  For questions, problems or results please call your physician - Ulices Smith MD at Work:  [phone].  [facility], EMERGENCY ROOM PHONE NUMBER: [phone]  IF A COMPLICATION OR EMERGENCY SITUATION ARISES AND YOU ARE UNABLE TO REACH   YOUR PHYSICIAN - GO DIRECTLY TO THE EMERGENCY ROOM.  Ulices Smith MD  2/1/2024 10:20:56 AM  This report has been verified and signed electronically.  Dear patient,  As a result of recent federal legislation (The Federal Cures Act), you may   receive lab or pathology results from your procedure in your [facility]   account before your physician is able to contact you. Your physician or   their representative will relay the results to you with their   recommendations at their soonest availability.  Thank you,  [employer]

## 2024-02-01 NOTE — ANESTHESIA PREPROCEDURE EVALUATION
02/01/2024  Chelsie Foster is a 68 y.o., female.      Pre-op Assessment    I have reviewed the Patient Summary Reports.     I have reviewed the Nursing Notes.       Review of Systems  Anesthesia Hx:  No problems with previous Anesthesia                Hematology/Oncology:  Hematology Normal   Oncology Normal                                   EENT/Dental:  EENT/Dental Normal           Cardiovascular:     Hypertension                                        Pulmonary:  Pulmonary Normal                       Renal/:  Renal/ Normal                 Hepatic/GI:     GERD             Musculoskeletal:  Musculoskeletal Normal                Neurological:  Neurology Normal                                      Endocrine:   Hypothyroidism          Dermatological:  Skin Normal    Psych:  Psychiatric History                  Physical Exam  General: Well nourished    Airway:  Mallampati: II   Mouth Opening: Normal  TM Distance: Normal  Tongue: Normal  Neck ROM: Normal ROM    Dental:  Intact        Anesthesia Plan  Type of Anesthesia, risks & benefits discussed:    Anesthesia Type: Gen Natural Airway  Intra-op Monitoring Plan: Standard ASA Monitors  Post Op Pain Control Plan: multimodal analgesia  Induction:  IV  Airway Plan: Direct  Informed Consent: Informed consent signed with the Patient and all parties understand the risks and agree with anesthesia plan.  All questions answered. Patient consented to blood products? No  ASA Score: 3  Day of Surgery Review of History & Physical: H&P Update referred to the surgeon/provider.    Ready For Surgery From Anesthesia Perspective.     .

## 2024-02-01 NOTE — H&P
Short Stay Endoscopy History and Physical    PCP - Christi Carrillo MD    Procedure - Colonoscopy  ASA - per anesthesia  Mallampati - per anesthesia  History of Anesthesia problems - no  Family history Anesthesia problems -  no   Plan of anesthesia - General    HPI:  This is a 68 y.o. female here for evaluation of : screening colonoscopy, 1st degree relative with colon cancer.    ROS:  Constitutional: No fevers, chills  CV: No chest pain  Pulm: No shortness of breath  GI: see HPI  Derm: No rash    Medical History:  has a past medical history of Anxiety, Basal cell carcinoma of nose (2023), Breast cancer (2015), Depression, Genetic testing (), GERD (gastroesophageal reflux disease), Hyperlipidemia, Hypertension, Hypothyroidism, PONV (postoperative nausea and vomiting), and Thyroid disease.    Surgical History:  has a past surgical history that includes  section; Appendectomy; Breast biopsy (Right, 2015); and Colonoscopy (N/A, 2016).    Family History: family history includes Bipolar disorder in her son; Breast cancer (age of onset: 49) in her cousin; Breast cancer (age of onset: 50) in her maternal aunt; Breast cancer (age of onset: 60) in her mother; Cancer in her father, maternal aunt, maternal cousin, and mother; Colon cancer in her father; Dementia in her father; Depression in her mother; Diabetes in her sister and son; Heart disease in her father; Hyperlipidemia in her mother; Hypertension in her mother, sister, and sister; Mental illness in her son.. Otherwise no colon cancer, inflammatory bowel disease, or GI malignancies.    Social History:  reports that she has never smoked. She has never used smokeless tobacco. She reports current alcohol use. She reports that she does not use drugs.    Review of patient's allergies indicates:   Allergen Reactions    No known drug allergies        Medications:   Medications Prior to Admission   Medication Sig Dispense Refill Last Dose     atenoloL (TENORMIN) 25 MG tablet Take 1 tablet (25 mg total) by mouth once daily. 90 tablet 3 2/1/2024    levothyroxine (SYNTHROID) 112 MCG tablet TAKE 1 TABLET BY MOUTH ONCE DAILY 90 tablet 3 2/1/2024    LORazepam (ATIVAN) 1 MG tablet TAKE 1/2 TO 1 (ONE-HALF TO ONE) TABLET BY MOUTH IN THE EVENING 30 tablet 5 Past Month    pantoprazole (PROTONIX) 40 MG tablet TAKE 1 TABLET BY MOUTH EVERY DAY 90 tablet 3 Past Week    pravastatin (PRAVACHOL) 40 MG tablet TAKE ONE TABLET BY MOUTH EVERY DAY 90 tablet 3 Past Week    triamterene-hydrochlorothiazide 37.5-25 mg (DYAZIDE) 37.5-25 mg per capsule TAKE 1 CAPSULE BY MOUTH EVERY DAY. 90 capsule 3 2/1/2024    augmented betamethasone dipropionate (DIPROLENE-AF) 0.05 % cream Apply topically 2 (two) times daily. 50 g 3     fluocinonide (LIDEX) 0.05 % ointment Apply topically 2 (two) times daily to rash 60 g 2     fluticasone propionate (FLONASE) 50 mcg/actuation nasal spray 1 spray (50 mcg total) by Each Nostril route 2 (two) times a day. 16 g 2 More than a month    HYDROcodone-acetaminophen (NORCO) 5-325 mg per tablet Take 1 tablet by mouth every 6 (six) hours as needed for Pain. 10 tablet 0 More than a month    triamcinolone acetonide 0.1% (KENALOG) 0.1 % cream Apply topically 2 (two) times daily. 45 g 1     triamcinolone acetonide 0.1% (KENALOG) 0.1 % ointment Apply to affected areas of rash twice a day for 2-4 weeks at a time as needed for rash. 454 g 2          Physical Exam:    Vital Signs:   Vitals:    02/01/24 0816   BP: (!) 132/91   Pulse: 72   Resp: 16   Temp: 96.6 °F (35.9 °C)       General Appearance: Well appearing in no acute distress  Eyes:    No scleral icterus  ENT: lips and tongue normal  Lungs: no use of accessory muscles  Heart:  normal rate, regular rhythm  Abdomen: non distended   Extremities: no edema  Skin: No rash      Labs:  Lab Results   Component Value Date    WBC 8.45 10/11/2023    HGB 13.2 10/11/2023    HCT 41.5 10/11/2023     10/11/2023    CHOL  194 10/11/2023    TRIG 142 10/11/2023    HDL 47 10/11/2023    ALT 25 10/11/2023    AST 20 10/11/2023     10/11/2023    K 3.6 10/11/2023     10/11/2023    CREATININE 0.8 10/11/2023    BUN 16 10/11/2023    CO2 30 (H) 10/11/2023    TSH 0.524 10/11/2023       I have explained the risks and benefits of endoscopy procedures to the patient including but not limited to bleeding, perforation, infection, and death.  The patient was asked if they understand and allowed to ask any further questions to their satisfaction.    Joellen Pedro MD

## 2024-02-01 NOTE — ANESTHESIA POSTPROCEDURE EVALUATION
Anesthesia Post Evaluation    Patient: Chelsie Foster    Procedure(s) Performed: Procedure(s) (LRB):  COLONOSCOPY (N/A)    Final Anesthesia Type: general      Patient location during evaluation: PACU  Patient participation: Yes- Able to Participate  Level of consciousness: awake and alert  Post-procedure vital signs: reviewed and stable  Pain management: adequate  Airway patency: patent    PONV status at discharge: No PONV  Anesthetic complications: no      Cardiovascular status: blood pressure returned to baseline  Respiratory status: spontaneous ventilation and room air  Hydration status: euvolemic  Follow-up not needed.              Vitals Value Taken Time   /72 02/01/24 1032   Temp 36.1 °C (97 °F) 02/01/24 1000   Pulse 68 02/01/24 1035   Resp 17 02/01/24 1030   SpO2 97 % 02/01/24 1035   Vitals shown include unvalidated device data.      No case tracking events are documented in the log.      Pain/Dung Score: Dung Score: 10 (2/1/2024 10:15 AM)

## 2024-02-05 LAB
FINAL PATHOLOGIC DIAGNOSIS: NORMAL
GROSS: NORMAL
Lab: NORMAL

## 2024-02-27 ENCOUNTER — OFFICE VISIT (OUTPATIENT)
Dept: DERMATOLOGY | Facility: CLINIC | Age: 69
End: 2024-02-27
Payer: MEDICARE

## 2024-02-27 DIAGNOSIS — L30.9 ECZEMA, UNSPECIFIED TYPE: Primary | ICD-10-CM

## 2024-02-27 PROCEDURE — 1126F AMNT PAIN NOTED NONE PRSNT: CPT | Mod: HCNC,CPTII,S$GLB, | Performed by: DERMATOLOGY

## 2024-02-27 PROCEDURE — 1101F PT FALLS ASSESS-DOCD LE1/YR: CPT | Mod: HCNC,CPTII,S$GLB, | Performed by: DERMATOLOGY

## 2024-02-27 PROCEDURE — 1160F RVW MEDS BY RX/DR IN RCRD: CPT | Mod: HCNC,CPTII,S$GLB, | Performed by: DERMATOLOGY

## 2024-02-27 PROCEDURE — 99212 OFFICE O/P EST SF 10 MIN: CPT | Mod: HCNC,S$GLB,, | Performed by: DERMATOLOGY

## 2024-02-27 PROCEDURE — 1159F MED LIST DOCD IN RCRD: CPT | Mod: HCNC,CPTII,S$GLB, | Performed by: DERMATOLOGY

## 2024-02-27 PROCEDURE — 3288F FALL RISK ASSESSMENT DOCD: CPT | Mod: HCNC,CPTII,S$GLB, | Performed by: DERMATOLOGY

## 2024-02-27 PROCEDURE — 99999 PR PBB SHADOW E&M-EST. PATIENT-LVL II: CPT | Mod: PBBFAC,HCNC,, | Performed by: DERMATOLOGY

## 2024-02-27 NOTE — PROGRESS NOTES
Subjective:      Patient ID:  Chelsie Foster is a 68 y.o. female who presents for   Chief Complaint   Patient presents with    Follow-up     Rash     History of Present Illness: The patient presents for follow up patch testing.    The patient was last seen on: 11/17/2023 for patch test reading - negative. Pt states rash has resolved. No flares since last appt. Using Diprolene AF cream as needed when itching. No longer taking antihistamines.            Review of Systems   Constitutional:  Negative for fever, chills and fatigue.   Skin:  Positive for dry skin. Negative for itching and rash.   Allergic/Immunologic: Negative for environmental allergies (not taking antihis).       Objective:   Physical Exam   Skin:   Areas Examined (abnormalities noted in diagram):   Head / Face Inspection Performed  Neck Inspection Performed  Chest / Axilla Inspection Performed            Diagram Legend     Erythematous scaling macule/papule c/w actinic keratosis       Vascular papule c/w angioma      Pigmented verrucoid papule/plaque c/w seborrheic keratosis      Yellow umbilicated papule c/w sebaceous hyperplasia      Irregularly shaped tan macule c/w lentigo     1-2 mm smooth white papules consistent with Milia      Movable subcutaneous cyst with punctum c/w epidermal inclusion cyst      Subcutaneous movable cyst c/w pilar cyst      Firm pink to brown papule c/w dermatofibroma      Pedunculated fleshy papule(s) c/w skin tag(s)      Evenly pigmented macule c/w junctional nevus     Mildly variegated pigmented, slightly irregular-bordered macule c/w mildly atypical nevus      Flesh colored to evenly pigmented papule c/w intradermal nevus       Pink pearly papule/plaque c/w basal cell carcinoma      Erythematous hyperkeratotic cursted plaque c/w SCC      Surgical scar with no sign of skin cancer recurrence      Open and closed comedones      Inflammatory papules and pustules      Verrucoid papule consistent consistent with wart      Erythematous eczematous patches and plaques     Dystrophic onycholytic nail with subungual debris c/w onychomycosis     Umbilicated papule    Erythematous-base heme-crusted tan verrucoid plaque consistent with inflamed seborrheic keratosis     Erythematous Silvery Scaling Plaque c/w Psoriasis     See annotation      Assessment / Plan:        Eczema, unspecified type - currently clear. Last flare 3 months ago  Patch test negative. No h/o AD  No clinical evidence of lesions today. No further treatment needed at this time. Ok to use Diprolene cream prn flare  Contact office if have severe flare.                No follow-ups on file.

## 2024-05-01 ENCOUNTER — PATIENT MESSAGE (OUTPATIENT)
Dept: INTERNAL MEDICINE | Facility: CLINIC | Age: 69
End: 2024-05-01
Payer: MEDICARE

## 2024-05-28 ENCOUNTER — HOSPITAL ENCOUNTER (OUTPATIENT)
Dept: RADIOLOGY | Facility: HOSPITAL | Age: 69
Discharge: HOME | End: 2024-05-28
Attending: STUDENT IN AN ORGANIZED HEALTH CARE EDUCATION/TRAINING PROGRAM
Payer: MEDICARE

## 2024-05-28 DIAGNOSIS — Z01.419 WELL WOMAN EXAM WITH ROUTINE GYNECOLOGICAL EXAM: ICD-10-CM

## 2024-05-28 DIAGNOSIS — Z12.31 BREAST CANCER SCREENING BY MAMMOGRAM: ICD-10-CM

## 2024-05-28 PROCEDURE — 77067 SCR MAMMO BI INCL CAD: CPT | Mod: TC,HCNC

## 2024-05-28 PROCEDURE — 77063 BREAST TOMOSYNTHESIS BI: CPT | Mod: 26,HCNC,, | Performed by: RADIOLOGY

## 2024-05-28 PROCEDURE — 77063 BREAST TOMOSYNTHESIS BI: CPT | Mod: TC,HCNC

## 2024-05-28 PROCEDURE — 77067 SCR MAMMO BI INCL CAD: CPT | Mod: 26,HCNC,, | Performed by: RADIOLOGY

## 2024-06-10 ENCOUNTER — PATIENT MESSAGE (OUTPATIENT)
Dept: INTERNAL MEDICINE | Facility: CLINIC | Age: 69
End: 2024-06-10
Payer: MEDICARE

## 2024-06-13 ENCOUNTER — OFFICE VISIT (OUTPATIENT)
Dept: OPTOMETRY | Facility: CLINIC | Age: 69
End: 2024-06-13
Payer: MEDICARE

## 2024-06-13 DIAGNOSIS — H25.13 NUCLEAR SCLEROSIS OF BOTH EYES: ICD-10-CM

## 2024-06-13 DIAGNOSIS — Z13.5 SCREENING FOR EYE CONDITION: ICD-10-CM

## 2024-06-13 DIAGNOSIS — H25.043 POSTERIOR SUBCAPSULAR POLAR AGE-RELATED CATARACT OF BOTH EYES: ICD-10-CM

## 2024-06-13 DIAGNOSIS — H43.393 VITREOUS FLOATERS OF BOTH EYES: ICD-10-CM

## 2024-06-13 DIAGNOSIS — Z01.00 EXAMINATION OF EYES AND VISION: Primary | ICD-10-CM

## 2024-06-13 PROCEDURE — 1159F MED LIST DOCD IN RCRD: CPT | Mod: HCNC,CPTII,S$GLB, | Performed by: OPTOMETRIST

## 2024-06-13 PROCEDURE — 99999 PR PBB SHADOW E&M-EST. PATIENT-LVL II: CPT | Mod: PBBFAC,HCNC,, | Performed by: OPTOMETRIST

## 2024-06-13 PROCEDURE — 92014 COMPRE OPH EXAM EST PT 1/>: CPT | Mod: HCNC,S$GLB,, | Performed by: OPTOMETRIST

## 2024-06-13 NOTE — PATIENT INSTRUCTIONS
Bilateral early nuclear sclerosis of lens, and bilateral minimal posterior subcapsular cataract.   Prior finding of bilateral vitreous floaters, without evidence of retinal etiology.     Otherwise, ocular health appears good in both eyes.      Prior diagnosis of bilateral compound myopic astigmatism and presbyopia.  Wears corneal RGP contact lenses with distance correction in both eyes.   Uses reading glasses over contact lenses.     Contact lens fit appears satisfactory in both eyes.  Wearing CLs well in both eyes.    Showing no need for power change in either contact lens.       Additionally, return within a few days in the early morning for refraction for new spectacle lens Rx to be worn in lieu of contact lenses.   Leave CLs out entirely that day prior to that visit

## 2024-06-13 NOTE — PROGRESS NOTES
"HPI     eye exam            Comments: Annual eye exam and contact lens follow-up.  Wears corneal RGP contact lenses.  No problems with CLs..  Would like updated spectacle lens Rx for use in lieu of CLs.  Did not   return without CLs in for refraction following last visit, as requested           Comments    Patient's age: 68 y.o. WF   Occupation: [employer] employee   Approximate date of last eye examination:  01/2023  Name of last eye doctor seen:    City/State: [facility]   Wears glasses? yes      If yes, wears  Full-time or part-time?  Part-time   Present glasses are: Bifocal, SV Distance, SV Reading?  Single-vision   Approximate age of present glasses:  16 years    Got new glasses following last exam, or subsequently?:  no    Any problem with [facility] with glasses?  yes - would like updated contact lens   Rx.   Wears CLs?:  yes            If yes:               Type of CL worn:  cornealgas-permeable corneal RGP contact   lenses.               Wears full-time or part-time:  full-time                Sleeps with contact lenses:  no                CL Solution used:  [employer] [employer]                How often replace CLs:  as needed               Any problem with [facility] with CLs?  No.                 Headaches?  Occasional - off and on   Eye pain/discomfort?  In Nov and December, had twitching in the eyelid on   right side, and then 'went away"                                                                                   Flashes? no   Floaters?  no   Diplopia/Double vision?  no   Patient's Ocular History:          Any eye surgeries? no          Any eye injury?  no          Any treatment for eye disease?  no   Family history of eye disease?  no   Significant patient medical history:         1. Diabetes?  no      If yes, IDDM or NIDDM? N/a   2. HBP?  yes - taking medication.  Controlled.               3. Other (describe):  no    ! OTC eyedrops currently using:  no    ! Prescription eye meds currently using:  no    ! Any " "history of allergy/adverse reaction to any eye meds used   previously?  no    ! Any history of allergy/adverse reaction to eyedrops used during prior   eye exam(s)? no    ! Any history of allergy/adverse reaction to Novacaine or similar meds?   no    ! Any history of allergy/adverse reaction to Epinephrine or similar meds?   no    ! Patient okay with use of anesthetic eyedrops to check eye pressure?    yes         ! Patient okay with use of eyedrops to dilate pupils today?  yes    !  Allergies/Medications/Medical History/Family History reviewed today?    yes       PD =   56/53   Desired reading distance =  16.25"   Approx computer distance =                                                      Last edited by Mat Ortiz OD on 6/13/2024  8:09 AM.            Assessment /Plan     For exam results, see Encounter Report.    1. Examination of eyes and vision        2. Nuclear sclerosis of both eyes        3. Posterior subcapsular polar age-related cataract of both eyes        4. Vitreous floaters of both eyes        5. Screening for eye condition                     Bilateral early nuclear sclerosis of lens, and bilateral minimal posterior subcapsular cataract.   Prior finding of bilateral vitreous floaters, without evidence of retinal etiology.     Otherwise, ocular health appears good in both eyes.      Prior diagnosis of bilateral compound myopic astigmatism and presbyopia.  Wears corneal RGP contact lenses with distance correction in both eyes.   Uses reading glasses over contact lenses.     Contact lens fit appears satisfactory in both eyes.  Wearing CLs well in both eyes.    Showing no need for power change in either contact lens.       Additionally, return within a few days in the early morning for refraction for new spectacle lens Rx to be worn in lieu of contact lenses.   Leave CLs out entirely that day prior to that visit           "

## 2024-06-21 RX ORDER — LORAZEPAM 1 MG/1
TABLET ORAL
Qty: 30 TABLET | Refills: 2 | Status: SHIPPED | OUTPATIENT
Start: 2024-06-21

## 2024-06-21 NOTE — TELEPHONE ENCOUNTER
No care due was identified.  [facility] Embedded Care Due Messages. Reference number: [id number].   6/21/2024 12:22:04 PM CDT

## 2024-06-27 ENCOUNTER — OFFICE VISIT (OUTPATIENT)
Dept: OPTOMETRY | Facility: CLINIC | Age: 69
End: 2024-06-27
Payer: MEDICARE

## 2024-06-27 DIAGNOSIS — H52.13 MYOPIA WITH PRESBYOPIA OF BOTH EYES: ICD-10-CM

## 2024-06-27 DIAGNOSIS — H25.13 NUCLEAR SCLEROSIS OF BOTH EYES: Primary | ICD-10-CM

## 2024-06-27 DIAGNOSIS — H52.4 MYOPIA WITH PRESBYOPIA OF BOTH EYES: ICD-10-CM

## 2024-06-27 PROCEDURE — 99499 UNLISTED E&M SERVICE: CPT | Mod: HCNC,S$GLB,, | Performed by: OPTOMETRIST

## 2024-06-27 PROCEDURE — 92015 DETERMINE REFRACTIVE STATE: CPT | Mod: HCNC,S$GLB,, | Performed by: OPTOMETRIST

## 2024-06-27 PROCEDURE — 99999 PR PBB SHADOW E&M-EST. PATIENT-LVL II: CPT | Mod: PBBFAC,HCNC,, | Performed by: OPTOMETRIST

## 2024-06-27 NOTE — PROGRESS NOTES
HPI     eye exam            Comments: Ms. Foster in today for refraction and spectacle lens Rx for   use in lieu of corneal RGP contact lenses.  Last wore CLs yesterday as usual            Comments    Refer to history taken at the last visit  Ms. Foster reports no changes since that date.           Last edited by Mat Ortiz, OD on 6/27/2024  7:10 AM.            Assessment /Plan     For exam results, see Encounter Report.  1. Nuclear sclerosis of both eyes        2. Myopia with presbyopia of both eyes                       Ms. Foster wears corneal RGP contact lenses.  In today for refraction and spectacle lens Rx for use in lieu of contact lenses.  Has not had contact lenses in today.  Last wore contact lenses yesterday as usual.    Myopia in each eye, with satisfactory best-corrected visual acuity in each eye.  Presbyopia consistent with age.  New spectacle lens Rx issued for use in lieu of contact lenses, as needed.    Recheck in one year - or prior, if any problems noted in the interim

## 2024-06-27 NOTE — PATIENT INSTRUCTIONS
Ms. Foster wears corneal RGP contact lenses.  In today for refraction and spectacle lens Rx for use in lieu of contact lenses.  Has not had contact lenses in today.  Last wore contact lenses yesterday as usual.    Myopia in each eye, with satisfactory best-corrected visual acuity in each eye.  Presbyopia consistent with age.  New spectacle lens Rx issued for use in lieu of contact lenses, as needed.    Recheck in one year - or prior, if any problems noted in the interim

## 2024-07-30 ENCOUNTER — OFFICE VISIT (OUTPATIENT)
Dept: URGENT CARE | Facility: CLINIC | Age: 69
End: 2024-07-30
Payer: MEDICARE

## 2024-07-30 ENCOUNTER — NURSE TRIAGE (OUTPATIENT)
Dept: ADMINISTRATIVE | Facility: CLINIC | Age: 69
End: 2024-07-30
Payer: MEDICARE

## 2024-07-30 VITALS
TEMPERATURE: 99 F | DIASTOLIC BLOOD PRESSURE: 85 MMHG | OXYGEN SATURATION: 98 % | RESPIRATION RATE: 18 BRPM | HEART RATE: 107 BPM | HEIGHT: 66 IN | SYSTOLIC BLOOD PRESSURE: 153 MMHG | WEIGHT: 165 LBS | BODY MASS INDEX: 26.52 KG/M2

## 2024-07-30 DIAGNOSIS — T78.40XA ALLERGIC REACTION, INITIAL ENCOUNTER: Primary | ICD-10-CM

## 2024-07-30 LAB
CTP QC/QA: YES
MOLECULAR STREP A: NEGATIVE

## 2024-07-30 PROCEDURE — 87651 STREP A DNA AMP PROBE: CPT | Mod: QW,S$GLB,, | Performed by: FAMILY MEDICINE

## 2024-07-30 PROCEDURE — 96372 THER/PROPH/DIAG INJ SC/IM: CPT | Mod: S$GLB,,, | Performed by: FAMILY MEDICINE

## 2024-07-30 PROCEDURE — 99213 OFFICE O/P EST LOW 20 MIN: CPT | Mod: 25,S$GLB,, | Performed by: FAMILY MEDICINE

## 2024-07-30 RX ORDER — BETAMETHASONE SODIUM PHOSPHATE AND BETAMETHASONE ACETATE 3; 3 MG/ML; MG/ML
6 INJECTION, SUSPENSION INTRA-ARTICULAR; INTRALESIONAL; INTRAMUSCULAR; SOFT TISSUE
Status: COMPLETED | OUTPATIENT
Start: 2024-07-30 | End: 2024-07-30

## 2024-07-30 RX ORDER — PREDNISONE 20 MG/1
40 TABLET ORAL DAILY
Qty: 10 TABLET | Refills: 0 | Status: SHIPPED | OUTPATIENT
Start: 2024-07-31 | End: 2024-08-05

## 2024-07-30 RX ORDER — DIPHENHYDRAMINE HYDROCHLORIDE 12.5 MG/5ML
25 LIQUID ORAL
Status: COMPLETED | OUTPATIENT
Start: 2024-07-30 | End: 2024-07-30

## 2024-07-30 RX ADMIN — BETAMETHASONE SODIUM PHOSPHATE AND BETAMETHASONE ACETATE 6 MG: 3; 3 INJECTION, SUSPENSION INTRA-ARTICULAR; INTRALESIONAL; INTRAMUSCULAR; SOFT TISSUE at 09:07

## 2024-07-30 RX ADMIN — DIPHENHYDRAMINE HYDROCHLORIDE 25 MG: 12.5 LIQUID ORAL at 09:07

## 2024-07-30 NOTE — PROGRESS NOTES
"Subjective:      Patient ID: Chelsie Foster is a 68 y.o. female.    Vitals:  height is 5' 6" (1.676 m) and weight is 74.8 kg (165 lb). Her temperature is 98.7 °F (37.1 °C). Her blood pressure is 153/85 (abnormal) and her pulse is 107. Her respiration is 18 and oxygen saturation is 98%.     Chief Complaint: Allergic Reaction    This is a 68 y.o. female who presents today with a chief complaint of   Tongue swelling, sore throat, swollen rt side chin and face, and rt hand itching. Symptoms started over the weekend.     Allergic Reaction  This is a new problem. The current episode started 2 days ago. The problem has been gradually worsening since onset. The problem is moderate. It is unknown what she was exposed to. The time of exposure is unknown. The exposure occurred at Home. Pertinent negatives include no coughing, stridor or wheezing. (Tongue swollen, right side of face/chin swollen,and rt hand itching) Treatments tried: zyrtec. The treatment provided no relief. Swelling is present on the hands, face, tongue and throat.       Respiratory:  Negative for cough, shortness of breath, stridor and wheezing.       Objective:     Physical Exam   Constitutional: normal  HENT:   Head: Normocephalic and atraumatic.   Cardiovascular: Normal rate, regular rhythm, normal heart sounds and normal pulses.   Pulmonary/Chest: Effort normal and breath sounds normal.   Abdominal: Normal appearance.   Musculoskeletal:         General: Swelling (left jaw, chin swelling noted) present. No tenderness.   Neurological: She is alert.   Skin: Skin is warm.   Nursing note and vitals reviewed.    Assessment:     1. Allergic reaction, initial encounter        Plan:       Allergic reaction, initial encounter  -     betamethasone acetate-betamethasone sodium phosphate injection 6 mg  -     POCT Strep A, Molecular  -     diphenhydrAMINE 12.5 mg/5 mL liquid 25 mg  -     predniSONE (DELTASONE) 20 MG tablet; Take 2 tablets (40 mg total) by mouth once " daily. Start 7/31/2024 if persistent swelling and/or difficulty breathing for 5 days  Dispense: 10 tablet; Refill: 0    ER precautions recomended

## 2024-07-30 NOTE — TELEPHONE ENCOUNTER
Phone call got disconnected.  Will callback.     Reason for Disposition   No answer.  First attempt to contact caller.  Follow-up call scheduled within 15 minutes.    Additional Information   Negative: Caller has already spoken with the PCP (or office), and has no further questions   Negative: Caller has already spoken with another triager and has no further questions   Negative: Caller has already spoken with another triager or PCP (or office), and has further questions and triager able to answer questions.   Negative: Busy signal.  First attempt to contact caller.  Follow-up call scheduled within 15 minutes.    Protocols used: No Contact or Duplicate Contact Call-A-OH

## 2024-08-01 ENCOUNTER — OFFICE VISIT (OUTPATIENT)
Dept: INTERNAL MEDICINE | Facility: CLINIC | Age: 69
End: 2024-08-01
Payer: MEDICARE

## 2024-08-01 ENCOUNTER — NURSE TRIAGE (OUTPATIENT)
Dept: ADMINISTRATIVE | Facility: CLINIC | Age: 69
End: 2024-08-01
Payer: MEDICARE

## 2024-08-01 VITALS
BODY MASS INDEX: 26.4 KG/M2 | WEIGHT: 164.25 LBS | SYSTOLIC BLOOD PRESSURE: 142 MMHG | HEIGHT: 66 IN | OXYGEN SATURATION: 97 % | HEART RATE: 70 BPM | DIASTOLIC BLOOD PRESSURE: 88 MMHG

## 2024-08-01 DIAGNOSIS — T78.3XXD ANGIOEDEMA, SUBSEQUENT ENCOUNTER: Primary | ICD-10-CM

## 2024-08-01 DIAGNOSIS — Z00.00 ANNUAL PHYSICAL EXAM: ICD-10-CM

## 2024-08-01 DIAGNOSIS — E03.4 HYPOTHYROIDISM DUE TO ACQUIRED ATROPHY OF THYROID: ICD-10-CM

## 2024-08-01 DIAGNOSIS — E78.5 HYPERLIPIDEMIA, UNSPECIFIED HYPERLIPIDEMIA TYPE: ICD-10-CM

## 2024-08-01 DIAGNOSIS — R07.89 ATYPICAL CHEST PAIN: ICD-10-CM

## 2024-08-01 DIAGNOSIS — I10 ESSENTIAL HYPERTENSION: ICD-10-CM

## 2024-08-01 PROCEDURE — 99999 PR PBB SHADOW E&M-EST. PATIENT-LVL III: CPT | Mod: PBBFAC,HCNC,, | Performed by: INTERNAL MEDICINE

## 2024-08-01 RX ORDER — DILTIAZEM HYDROCHLORIDE 180 MG/1
180 CAPSULE, COATED, EXTENDED RELEASE ORAL DAILY
Qty: 90 CAPSULE | Refills: 1 | Status: SHIPPED | OUTPATIENT
Start: 2024-08-01 | End: 2025-08-01

## 2024-08-01 RX ORDER — DILTIAZEM HYDROCHLORIDE 180 MG/1
180 CAPSULE, COATED, EXTENDED RELEASE ORAL DAILY
Qty: 90 CAPSULE | Refills: 1 | Status: SHIPPED | OUTPATIENT
Start: 2024-08-01 | End: 2024-08-01 | Stop reason: SDUPTHER

## 2024-08-01 RX ORDER — EPINEPHRINE 0.3 MG/.3ML
1 INJECTION SUBCUTANEOUS ONCE
Qty: 0.3 ML | Refills: 1 | Status: SHIPPED | OUTPATIENT
Start: 2024-08-01 | End: 2024-08-01 | Stop reason: SDUPTHER

## 2024-08-01 RX ORDER — EPINEPHRINE 0.3 MG/.3ML
1 INJECTION SUBCUTANEOUS ONCE
Qty: 2 EACH | Refills: 1 | Status: SHIPPED | OUTPATIENT
Start: 2024-08-01 | End: 2024-08-02

## 2024-08-01 NOTE — PROGRESS NOTES
Subjective     Patient ID: Chelsie Foster is a 68 y.o. female.    Chief Complaint: blood pressure concern     HPI  Awoke with swollen tongue and sore throat 7/30, tx with prednisone by .     No sob. Swelling has resolved.      99/85 by home machine yday, so she skipped dyazide this am, but she took atenolol.    Eczema, managed by Derm.     Patch testing negative.    Occas palpitations, for which she is taking atenolol     Periodic cp, tightness,as documented in previous encounters   .  Belches and feels better.     Symptoms are stable, but she is not ready for stress test.        Review of Systems   Constitutional:  Negative for fever and unexpected weight change.   HENT:  Negative for nasal congestion and postnasal drip.    Eyes:  Negative for pain, discharge and visual disturbance.   Respiratory:  Negative for cough, chest tightness, shortness of breath and wheezing.    Cardiovascular:  Negative for chest pain and leg swelling.   Gastrointestinal:  Negative for abdominal pain, constipation, diarrhea and nausea.   Genitourinary:  Negative for difficulty urinating, dysuria and hematuria.   Integumentary:  Negative for rash.   Neurological:  Negative for headaches.   Psychiatric/Behavioral:  Negative for dysphoric mood and sleep disturbance. The patient is not nervous/anxious.           Objective     Physical Exam  Constitutional:       General: She is not in acute distress.     Appearance: She is well-developed. She is not ill-appearing, toxic-appearing or diaphoretic.   Eyes:      General: No scleral icterus.  Neck:      Thyroid: No thyromegaly.      Vascular: No JVD.   Cardiovascular:      Rate and Rhythm: Normal rate and regular rhythm.      Heart sounds: Normal heart sounds. No murmur heard.  Pulmonary:      Effort: Pulmonary effort is normal. No respiratory distress.      Breath sounds: Normal breath sounds. No stridor. No wheezing, rhonchi or rales.   Abdominal:      General: There is no distension.       Palpations: Abdomen is soft. There is no mass.      Tenderness: There is no abdominal tenderness. There is no guarding.      Hernia: No hernia is present.   Musculoskeletal:      Cervical back: No rigidity or tenderness.      Right lower leg: No edema.      Left lower leg: No edema.   Lymphadenopathy:      Cervical: No cervical adenopathy.   Neurological:      Mental Status: She is alert and oriented to person, place, and time.   Psychiatric:         Mood and Affect: Mood normal.         Behavior: Behavior normal.         Thought Content: Thought content normal.            Assessment and Plan     1. Angioedema, subsequent encounter    2. Annual physical exam    3. Essential hypertension  -     Comprehensive Metabolic Panel; Future; Expected date: 08/01/2024  -     Hypertension Digital Medicine (HDMP) Enrollment Order    4. Hypothyroidism due to acquired atrophy of thyroid  -     TSH; Future; Expected date: 08/01/2024    5. Hyperlipidemia, unspecified hyperlipidemia type  -     Lipid Panel; Future; Expected date: 08/01/2024    6. BMI 26.0-26.9,adult  -     CBC Without Differential; Future; Expected date: 08/01/2024    7. Atypical chest pain  -     Exercise Stress - EKG; Future    Other orders  -     Discontinue: EPINEPHrine (EPIPEN) 0.3 mg/0.3 mL AtIn; Inject 0.3 mLs (0.3 mg total) into the muscle once. for 1 dose  Dispense: 0.3 mL; Refill: 1  -     Discontinue: diltiaZEM (CARDIZEM CD) 180 MG 24 hr capsule; Take 1 capsule (180 mg total) by mouth once daily.  Dispense: 90 capsule; Refill: 1  -     diltiaZEM (CARDIZEM CD) 180 MG 24 hr capsule; Take 1 capsule (180 mg total) by mouth once daily.  Dispense: 90 capsule; Refill: 1  -     EPINEPHrine (EPIPEN) 0.3 mg/0.3 mL AtIn; Inject 0.3 mLs (0.3 mg total) into the muscle once. for 1 dose  Dispense: 2 each; Refill: 1          Stop atenolol due to angioedema and potential need to beta agonist therapy in future..    Start Cardizem for palpitations, Htn.    RTC 2 mo..          No follow-ups on file.

## 2024-08-01 NOTE — TELEPHONE ENCOUNTER
Yesterday, did not feel well over weekend. Had allergic episode on 7/30. Started on steroids. C/O not feeling well, very tired yesterday.   Picked up RX, did a lot, tired, made a sandwich. Started feeling really tired. BP 99/85,pulse 75,  concerned. Patient does no have BP cuff, po hydration discussed. Triage done- dispo see provider today. Appointment made with Dr. Carrillo at 3 pm, location, provider and time verified. Patient verb understanding, she will hold her BP RX until she sees provider.   Reason for Disposition   Systolic BP  while taking blood pressure medications and NOT dizzy, lightheaded or weak    Additional Information   Negative: Systolic BP < 90 and feeling dizzy, lightheaded, or weak   Negative: Started suddenly after an allergic medicine, an allergic food, or bee sting   Negative: Shock suspected (e.g., cold/pale/clammy skin, too weak to stand, low BP, rapid pulse)   Negative: Difficult to awaken or acting confused (e.g., disoriented, slurred speech)   Negative: Fainted   Negative: Chest pain   Negative: Bleeding (e.g., vomiting blood, rectal bleeding or tarry stools, severe vaginal bleeding)   Negative: Extra heartbeats, irregular heart beating, or heart is beating very fast (i.e., 'palpitations')   Negative: Sounds like a life-threatening emergency to the triager   Negative: Systolic BP < 80 and NOT dizzy, lightheaded or weak (feels normal)   Negative: Abdominal pain   Negative: Major surgery in the past month   Negative: Fever > 100.4 F (38.0 C)   Negative: Drinking very little and dehydration suspected (e.g., no urine > 12 hours, very dry mouth, very lightheaded)   Negative: Fall in systolic BP > 20 mm Hg from normal and feeling dizzy, lightheaded, or weak   Negative: Patient sounds very sick or weak to the triager   Negative: Systolic BP < 90 and NOT dizzy, lightheaded or weak    Protocols used: Blood Pressure - Low-A-OH

## 2024-08-13 ENCOUNTER — LAB VISIT (OUTPATIENT)
Dept: LAB | Facility: HOSPITAL | Age: 69
End: 2024-08-13
Payer: MEDICARE

## 2024-08-13 DIAGNOSIS — E03.4 HYPOTHYROIDISM DUE TO ACQUIRED ATROPHY OF THYROID: ICD-10-CM

## 2024-08-13 DIAGNOSIS — E78.5 HYPERLIPIDEMIA, UNSPECIFIED HYPERLIPIDEMIA TYPE: ICD-10-CM

## 2024-08-13 DIAGNOSIS — I10 ESSENTIAL HYPERTENSION: ICD-10-CM

## 2024-08-13 LAB
ALBUMIN SERPL BCP-MCNC: 3.6 G/DL (ref 3.5–5.2)
ALP SERPL-CCNC: 103 U/L (ref 55–135)
ALT SERPL W/O P-5'-P-CCNC: 25 U/L (ref 10–44)
ANION GAP SERPL CALC-SCNC: 12 MMOL/L (ref 8–16)
AST SERPL-CCNC: 20 U/L (ref 10–40)
BILIRUB SERPL-MCNC: 0.4 MG/DL (ref 0.1–1)
BUN SERPL-MCNC: 19 MG/DL (ref 8–23)
CALCIUM SERPL-MCNC: 10.3 MG/DL (ref 8.7–10.5)
CHLORIDE SERPL-SCNC: 98 MMOL/L (ref 95–110)
CHOLEST SERPL-MCNC: 224 MG/DL (ref 120–199)
CHOLEST/HDLC SERPL: 4.5 {RATIO} (ref 2–5)
CO2 SERPL-SCNC: 29 MMOL/L (ref 23–29)
CREAT SERPL-MCNC: 0.8 MG/DL (ref 0.5–1.4)
ERYTHROCYTE [DISTWIDTH] IN BLOOD BY AUTOMATED COUNT: 14.5 % (ref 11.5–14.5)
EST. GFR  (NO RACE VARIABLE): >60 ML/MIN/1.73 M^2
GLUCOSE SERPL-MCNC: 102 MG/DL (ref 70–110)
HCT VFR BLD AUTO: 42.8 % (ref 37–48.5)
HDLC SERPL-MCNC: 50 MG/DL (ref 40–75)
HDLC SERPL: 22.3 % (ref 20–50)
HGB BLD-MCNC: 13.6 G/DL (ref 12–16)
LDLC SERPL CALC-MCNC: 137.4 MG/DL (ref 63–159)
MCH RBC QN AUTO: 28.5 PG (ref 27–31)
MCHC RBC AUTO-ENTMCNC: 31.8 G/DL (ref 32–36)
MCV RBC AUTO: 90 FL (ref 82–98)
NONHDLC SERPL-MCNC: 174 MG/DL
PLATELET # BLD AUTO: 341 K/UL (ref 150–450)
PMV BLD AUTO: 10.5 FL (ref 9.2–12.9)
POTASSIUM SERPL-SCNC: 3.4 MMOL/L (ref 3.5–5.1)
PROT SERPL-MCNC: 8 G/DL (ref 6–8.4)
RBC # BLD AUTO: 4.78 M/UL (ref 4–5.4)
SODIUM SERPL-SCNC: 139 MMOL/L (ref 136–145)
TRIGL SERPL-MCNC: 183 MG/DL (ref 30–150)
TSH SERPL DL<=0.005 MIU/L-ACNC: 0.57 UIU/ML (ref 0.4–4)
WBC # BLD AUTO: 8.73 K/UL (ref 3.9–12.7)

## 2024-08-13 PROCEDURE — 80053 COMPREHEN METABOLIC PANEL: CPT | Mod: HCNC | Performed by: INTERNAL MEDICINE

## 2024-08-13 PROCEDURE — 85027 COMPLETE CBC AUTOMATED: CPT | Mod: HCNC | Performed by: INTERNAL MEDICINE

## 2024-08-13 PROCEDURE — 84443 ASSAY THYROID STIM HORMONE: CPT | Mod: HCNC | Performed by: INTERNAL MEDICINE

## 2024-08-13 PROCEDURE — 36415 COLL VENOUS BLD VENIPUNCTURE: CPT | Mod: HCNC | Performed by: INTERNAL MEDICINE

## 2024-08-13 PROCEDURE — 80061 LIPID PANEL: CPT | Mod: HCNC | Performed by: INTERNAL MEDICINE

## 2024-08-16 ENCOUNTER — OFFICE VISIT (OUTPATIENT)
Dept: FAMILY MEDICINE | Facility: CLINIC | Age: 69
End: 2024-08-16
Payer: MEDICARE

## 2024-08-16 VITALS
DIASTOLIC BLOOD PRESSURE: 82 MMHG | BODY MASS INDEX: 25.86 KG/M2 | WEIGHT: 160.94 LBS | HEART RATE: 97 BPM | OXYGEN SATURATION: 95 % | SYSTOLIC BLOOD PRESSURE: 132 MMHG | HEIGHT: 66 IN

## 2024-08-16 DIAGNOSIS — K21.9 GASTROESOPHAGEAL REFLUX DISEASE, UNSPECIFIED WHETHER ESOPHAGITIS PRESENT: ICD-10-CM

## 2024-08-16 DIAGNOSIS — I10 HYPERTENSION, UNSPECIFIED TYPE: ICD-10-CM

## 2024-08-16 DIAGNOSIS — E03.9 HYPOTHYROIDISM, UNSPECIFIED TYPE: ICD-10-CM

## 2024-08-16 DIAGNOSIS — Z00.00 ENCOUNTER FOR PREVENTIVE HEALTH EXAMINATION: Primary | ICD-10-CM

## 2024-08-16 DIAGNOSIS — E78.5 HYPERLIPIDEMIA, UNSPECIFIED HYPERLIPIDEMIA TYPE: ICD-10-CM

## 2024-08-16 PROCEDURE — 99999 PR PBB SHADOW E&M-EST. PATIENT-LVL IV: CPT | Mod: PBBFAC,HCNC,, | Performed by: NURSE PRACTITIONER

## 2024-08-16 NOTE — PATIENT INSTRUCTIONS
Counseling and Referral of Other Preventative  (Italic type indicates deductible and co-insurance are waived)    Patient Name: Chelsie Foster  Today's Date: 8/16/2024    Health Maintenance       Date Due Completion Date    Hemoglobin A1c (Diabetic Prevention Screening) Never done ---    TETANUS VACCINE 08/23/2024 (Originally 10/13/1973) ---    Shingles Vaccine (1 of 2) 08/23/2024 (Originally 10/13/2005) ---    COVID-19 Vaccine (3 - 2023-24 season) 08/23/2024 (Originally 9/1/2023) 1/19/2021    RSV Vaccine (Age 60+ and Pregnant patients) (1 - 1-dose 60+ series) 08/23/2024 (Originally 10/13/2015) ---    Influenza Vaccine (1) 09/01/2024 10/17/2023    Mammogram 05/28/2025 5/28/2024    DEXA Scan 11/01/2025 11/1/2023    Colorectal Cancer Screening 02/01/2029 2/1/2024    Lipid Panel 08/13/2029 8/13/2024        No orders of the defined types were placed in this encounter.    The following information is provided to all patients.  This information is to help you find resources for any of the problems found today that may be affecting your health:                  Living healthy guide: www.[facility].[place].gov      Understanding Diabetes: www.diabetes.org      Eating healthy: www.cdc.gov/healthyweight      CDC home safety checklist: www.cdc.gov/steadi/patient.html      Agency on Aging: www.goea.[place].gov      Alcoholics anonymous (AA): www.aa.org      Physical Activity: www.vita.nih.gov/hw8unnw      Tobacco use: www.quitwithusla.org

## 2024-08-16 NOTE — PROGRESS NOTES
Chelsie Foster presented for a  Medicare AWV and comprehensive Health Risk Assessment today. The following components were reviewed and updated:    Medical history  Family History  Social history  Allergies and Current Medications  Health Risk Assessment  Health Maintenance  Care Team         ** See Completed Assessments for Annual Wellness Visit within the encounter summary.**         The following assessments were completed:  Living Situation  CAGE  Depression Screening  Timed Get Up and Go  Whisper Test  Cognitive Function Screening    Nutrition Screening  ADL Screening  PAQ Screening      Opioid documentation for eAWV      Patient does not have a current opioid prescription.        Review for Substance Use Disorders: Patient does not use substance        Current Outpatient Medications:     diltiaZEM (CARDIZEM CD) 180 MG 24 hr capsule, Take 1 capsule (180 mg total) by mouth once daily., Disp: 90 capsule, Rfl: 1    EPINEPHrine (EPIPEN) 0.3 mg/0.3 mL AtIn, Inject 0.3 mLs (0.3 mg total) into the muscle once. for 1 dose, Disp: 2 each, Rfl: 1    fluocinonide (LIDEX) 0.05 % ointment, Apply topically 2 (two) times daily to rash, Disp: 60 g, Rfl: 2    levothyroxine (SYNTHROID) 112 MCG tablet, TAKE 1 TABLET BY MOUTH ONCE DAILY, Disp: 90 tablet, Rfl: 3    LORazepam (ATIVAN) 1 MG tablet, TAKE 1/2 TO 1 (ONE-HALF TO ONE) TABLET BY MOUTH IN THE EVENING, Disp: 30 tablet, Rfl: 2    pantoprazole (PROTONIX) 40 MG tablet, TAKE 1 TABLET BY MOUTH EVERY DAY, Disp: 90 tablet, Rfl: 3    pravastatin (PRAVACHOL) 40 MG tablet, TAKE ONE TABLET BY MOUTH EVERY DAY, Disp: 90 tablet, Rfl: 3    triamcinolone acetonide 0.1% (KENALOG) 0.1 % cream, Apply topically 2 (two) times daily., Disp: 45 g, Rfl: 1    triamterene-hydrochlorothiazide 37.5-25 mg (DYAZIDE) 37.5-25 mg per capsule, TAKE 1 CAPSULE BY MOUTH EVERY DAY., Disp: 90 capsule, Rfl: 3    augmented betamethasone dipropionate (DIPROLENE-AF) 0.05 % cream, Apply topically 2 (two) times  "daily. (Patient not taking: Reported on 8/16/2024), Disp: 50 g, Rfl: 3    triamcinolone acetonide 0.1% (KENALOG) 0.1 % ointment, Apply to affected areas of rash twice a day for 2-4 weeks at a time as needed for rash. (Patient not taking: Reported on 8/16/2024), Disp: 454 g, Rfl: 2       Vitals:    08/16/24 0906   BP: 132/82   Pulse: 97   SpO2: 95%   Weight: 73 kg (160 lb 15 oz)   Height: 5' 6" (1.676 m)   PainSc: 0-No pain      Physical Exam  Vitals reviewed.   Constitutional:       General: She is not in acute distress.     Appearance: Normal appearance. She is not ill-appearing.   HENT:      Head: Normocephalic and atraumatic.      Mouth/Throat:      Mouth: Mucous membranes are moist.   Eyes:      General: No scleral icterus.        Right eye: No discharge.         Left eye: No discharge.      Extraocular Movements: Extraocular movements intact.      Conjunctiva/sclera: Conjunctivae normal.   Cardiovascular:      Rate and Rhythm: Normal rate.   Pulmonary:      Effort: Pulmonary effort is normal. No respiratory distress.   Musculoskeletal:      Cervical back: Normal range of motion.   Skin:     General: Skin is warm and dry.   Neurological:      Mental Status: She is alert and oriented to person, place, and time.   Psychiatric:         Mood and Affect: Mood normal.         Behavior: Behavior normal. Behavior is cooperative.         Cognition and Memory: Cognition and memory normal.               Diagnoses and health risks identified today and associated recommendations/orders:    1. Encounter for preventive health examination  - Chart reviewed. Problem list updated. Discussed current medical diagnosis, current medications, medical/surgical/family/social history; updated provider list; documented vital signs; identified any cognitive impairment; and updated risk factor list. Addressed any outstanding health maintenance. Provided patient with personalized health advice. Continue to follow up with PCP and any " specialists.     2. Gastroesophageal reflux disease, unspecified whether esophagitis present  Chronic; stable on current treatment plan; follow up with PCP  - taking ppi    3. Hypothyroidism, unspecified type  Chronic; stable on current treatment plan; follow up with PCP  - taking synthroid     4. Hypertension, unspecified type  Chronic; stable on current treatment plan; follow up with PCP  - taking dyazide, diltiazem    5. Hyperlipidemia, unspecified hyperlipidemia type  Chronic; stable on current treatment plan; follow up with PCP  - taking statin     6. BMI 25.0-25.9,adult  - Recommendation for healthy diet and increasing exercise as tolerated with goal of 150min/week       Provided Chelsie with a 5-10 year written screening schedule and personal prevention plan. Recommendations were developed using the USPSTF age appropriate recommendations. Education, counseling, and referrals were provided as needed. After Visit Summary printed and given to patient which includes a list of additional screenings\tests needed.    Follow up in about 1 year (around 8/16/2025) for your next medicare wellness visit.    Crystal Weller, FNP-C    Advance Care Planning     I offered to discuss advanced care planning, including how to pick a person who would make decisions for you if you were unable to make them for yourself, called a health care power of , and what kind of decisions you might make such as use of life sustaining treatments such as ventilators and tube feeding when faced with a life limiting illness recorded on a living will that they will need to know. (How you want to be cared for as you near the end of your natural life)     X Patient is interested in learning more about how to make advanced directives.  I provided them paperwork and offered to discuss this with them.

## 2024-08-18 DIAGNOSIS — E78.5 HYPERLIPIDEMIA, UNSPECIFIED HYPERLIPIDEMIA TYPE: ICD-10-CM

## 2024-08-18 RX ORDER — PRAVASTATIN SODIUM 80 MG/1
80 TABLET ORAL DAILY
Qty: 90 TABLET | Refills: 3 | Status: SHIPPED | OUTPATIENT
Start: 2024-08-18

## 2024-08-19 ENCOUNTER — TELEPHONE (OUTPATIENT)
Dept: INTERNAL MEDICINE | Facility: CLINIC | Age: 69
End: 2024-08-19
Payer: MEDICARE

## 2024-08-19 DIAGNOSIS — I10 ESSENTIAL HYPERTENSION: ICD-10-CM

## 2024-08-19 DIAGNOSIS — E78.5 HYPERLIPIDEMIA, UNSPECIFIED HYPERLIPIDEMIA TYPE: Primary | ICD-10-CM

## 2024-08-19 NOTE — TELEPHONE ENCOUNTER
"----- Message from Maci Rubin sent at 8/19/2024  9:21 AM CDT -----  Regarding: Labs Request  "Bmp, lipids, alt ast 2mo"    Good morning, I'm not seeing any labs in the active request section for me to schedule patient in 2 months. Once labs are placed, I will contact patient and get her scheduled, thanks.  "

## 2024-08-19 NOTE — TELEPHONE ENCOUNTER
----- Message from Christi Carrillo MD sent at 8/18/2024  3:50 PM CDT -----  Bmp, lipids, alt ast 2mo

## 2024-08-27 ENCOUNTER — HOSPITAL ENCOUNTER (OUTPATIENT)
Dept: CARDIOLOGY | Facility: HOSPITAL | Age: 69
Discharge: HOME | End: 2024-08-27
Attending: INTERNAL MEDICINE
Payer: MEDICARE

## 2024-08-27 VITALS — BODY MASS INDEX: 26.03 KG/M2 | HEIGHT: 66 IN | WEIGHT: 162 LBS

## 2024-08-27 DIAGNOSIS — R07.89 ATYPICAL CHEST PAIN: ICD-10-CM

## 2024-08-27 LAB
CV STRESS BASE HR: 105 BPM
DIASTOLIC BLOOD PRESSURE: 67 MMHG
OHS CV CPX 1 MINUTE RECOVERY HEART RATE: 137 BPM
OHS CV CPX 85 PERCENT MAX PREDICTED HEART RATE MALE: 129
OHS CV CPX ESTIMATED METS: 7
OHS CV CPX MAX PREDICTED HEART RATE: 152
OHS CV CPX PATIENT IS FEMALE: 1
OHS CV CPX PATIENT IS MALE: 0
OHS CV CPX PEAK DIASTOLIC BLOOD PRESSURE: 100 MMHG
OHS CV CPX PEAK HEAR RATE: 137 BPM
OHS CV CPX PEAK RATE PRESSURE PRODUCT: NORMAL
OHS CV CPX PEAK SYSTOLIC BLOOD PRESSURE: 201 MMHG
OHS CV CPX PERCENT MAX PREDICTED HEART RATE ACHIEVED: 94
OHS CV CPX RATE PRESSURE PRODUCT PRESENTING: NORMAL
STRESS ECHO POST EXERCISE DUR MIN: 4 MINUTES
STRESS ECHO POST EXERCISE DUR SEC: 41 SECONDS
SYSTOLIC BLOOD PRESSURE: 148 MMHG

## 2024-08-27 PROCEDURE — 93016 CV STRESS TEST SUPVJ ONLY: CPT | Mod: ,,, | Performed by: INTERNAL MEDICINE

## 2024-08-27 PROCEDURE — 93018 CV STRESS TEST I&R ONLY: CPT | Mod: ,,, | Performed by: INTERNAL MEDICINE

## 2024-08-27 PROCEDURE — 93017 CV STRESS TEST TRACING ONLY: CPT

## 2024-09-19 ENCOUNTER — TELEPHONE (OUTPATIENT)
Dept: INTERNAL MEDICINE | Facility: CLINIC | Age: 69
End: 2024-09-19
Payer: MEDICARE

## 2024-09-19 ENCOUNTER — TELEPHONE (OUTPATIENT)
Dept: ORTHOPEDICS | Facility: CLINIC | Age: 69
End: 2024-09-19
Payer: MEDICARE

## 2024-09-19 NOTE — TELEPHONE ENCOUNTER
----- Message from Owen Ellington sent at 9/19/2024  9:55 AM CDT -----  Contact: Patient @ [phone]  [email or web address]     Patient is calling for Medical Advice regarding: Patient fell and broke humerus/arm. Also has a minor fracture on nose. Wants to let her know  From [facility] will be calling regarding getting her an appt.    How long has patient had these symptoms: yesterday evening    Pharmacy name and phone#:   [facility]  [address] [address]  Phone: [phone] Fax: [phone]    Patient wants a call back or thru [facility]: call    Comments:    Please advise patient replies from provider may take up to 48 hours.

## 2024-09-19 NOTE — TELEPHONE ENCOUNTER
Spoke with pt. Pt requested to be seen Monday. Appointment scheduled. Pt verbalized acceptance of appoint date, time, location and co pay.  ----- Message from Catarina Roberts sent at 9/19/2024  9:58 AM CDT -----  Regarding: Fx referral  Good morning,    Current pt is being referred from Dr Octavio Gee for Closed displaced fracture of L humerus, she was seen at [facility] ED last night but is requesting [facility]. Please contact pt to schedule and let me know if I can help any further.    Thank you,  Catarina Roberts  Clinic   Ext [phone]

## 2024-09-20 ENCOUNTER — HOSPITAL ENCOUNTER (OUTPATIENT)
Dept: RADIOLOGY | Facility: HOSPITAL | Age: 69
Discharge: HOME | End: 2024-09-20
Attending: NURSE PRACTITIONER
Payer: MEDICARE

## 2024-09-20 ENCOUNTER — OFFICE VISIT (OUTPATIENT)
Dept: ORTHOPEDICS | Facility: CLINIC | Age: 69
End: 2024-09-20
Payer: MEDICARE

## 2024-09-20 DIAGNOSIS — M25.512 ACUTE PAIN OF LEFT SHOULDER: ICD-10-CM

## 2024-09-20 DIAGNOSIS — M25.512 ACUTE PAIN OF LEFT SHOULDER: Primary | ICD-10-CM

## 2024-09-20 DIAGNOSIS — S42.252A CLOSED DISPLACED FRACTURE OF GREATER TUBEROSITY OF LEFT HUMERUS, INITIAL ENCOUNTER: Primary | ICD-10-CM

## 2024-09-20 PROCEDURE — 99999 PR PBB SHADOW E&M-EST. PATIENT-LVL III: CPT | Mod: PBBFAC,HCNC,, | Performed by: NURSE PRACTITIONER

## 2024-09-20 PROCEDURE — 73030 X-RAY EXAM OF SHOULDER: CPT | Mod: 26,HCNC,LT, | Performed by: RADIOLOGY

## 2024-09-20 PROCEDURE — 73030 X-RAY EXAM OF SHOULDER: CPT | Mod: TC,HCNC,LT

## 2024-09-20 RX ORDER — ACETAMINOPHEN 500 MG
1000 TABLET ORAL EVERY 8 HOURS PRN
Qty: 90 TABLET | Refills: 0 | Status: SHIPPED | OUTPATIENT
Start: 2024-09-20

## 2024-09-20 RX ORDER — METHOCARBAMOL 500 MG/1
500 TABLET, FILM COATED ORAL 4 TIMES DAILY
Qty: 40 TABLET | Refills: 0 | Status: SHIPPED | OUTPATIENT
Start: 2024-09-20 | End: 2024-09-30

## 2024-09-20 RX ORDER — OXYCODONE HYDROCHLORIDE 5 MG/1
5 TABLET ORAL EVERY 6 HOURS PRN
Qty: 28 TABLET | Refills: 0 | Status: SHIPPED | OUTPATIENT
Start: 2024-09-20

## 2024-09-20 NOTE — PROGRESS NOTES
SUBJECTIVE:     Chief Complaint & History of Present Illness:  Chelsie Foster is a New 68 y.o. year old female patient presenting with constant left shoulder pain that started on September 18, 2024 secondary to a trip and fall.  Patient felt immediate pain to her left shoulder.  She went to urgent care on a [facility] where she was found to have a fracture at the proximal humerus.  She was placed in a sling, given a prescription for [place] and referred to Orthopedics.    She is Right hand dominant.  There is a history of injury.  The pain is located in the  upper arm aspect of the shoulder.  The pain is described as achy, 8-9/10.  It is aggravated by activity.  Associated symptoms include none.  Previous treatments include sling and Norco which have provided minimal relief.  There is not a history of previous injury or surgery to the shoulder.      Review of patient's allergies indicates:   Allergen Reactions    No known drug allergies          Current Outpatient Medications   Medication Sig Dispense Refill    diltiaZEM (CARDIZEM CD) 180 MG 24 hr capsule Take 1 capsule (180 mg total) by mouth once daily. 90 capsule 1    fluocinonide (LIDEX) 0.05 % ointment Apply topically 2 (two) times daily to rash 60 g 2    HYDROcodone-acetaminophen (NORCO) 5-325 mg per tablet Take 1 tablet by mouth every 6 (six) hours as needed for Pain. 12 tablet 0    levothyroxine (SYNTHROID) 112 MCG tablet TAKE 1 TABLET BY MOUTH ONCE DAILY 90 tablet 3    LORazepam (ATIVAN) 1 MG tablet TAKE 1/2 TO 1 (ONE-HALF TO ONE) TABLET BY MOUTH IN THE EVENING 30 tablet 2    pantoprazole (PROTONIX) 40 MG tablet TAKE 1 TABLET BY MOUTH EVERY DAY 90 tablet 3    pravastatin (PRAVACHOL) 80 MG tablet Take 1 tablet (80 mg total) by mouth once daily. 90 tablet 3    triamcinolone acetonide 0.1% (KENALOG) 0.1 % cream Apply topically 2 (two) times daily. 45 g 1    triamcinolone acetonide 0.1% (KENALOG) 0.1 % ointment Apply to affected areas of rash twice a day for  2-4 weeks at a time as needed for rash. 454 g 2    triamterene-hydrochlorothiazide 37.5-25 mg (DYAZIDE) 37.5-25 mg per capsule TAKE 1 CAPSULE BY MOUTH EVERY DAY. 90 capsule 3    augmented betamethasone dipropionate (DIPROLENE-AF) 0.05 % cream Apply topically 2 (two) times daily. (Patient not taking: Reported on 2024) 50 g 3    EPINEPHrine (EPIPEN) 0.3 mg/0.3 mL AtIn Inject 0.3 mLs (0.3 mg total) into the muscle once. for 1 dose 2 each 1     No current facility-administered medications for this visit.       Past Medical History:   Diagnosis Date    Anxiety     Basal cell carcinoma of nose 2023    nasal bridge    Breast cancer 2015    Right breast infiltrating ductal CA, ER/ID/Her2 negative    Depression     Genetic testing     see clinic notes 5-19-15 for details, VUS in [facility]    GERD (gastroesophageal reflux disease)     Hyperlipidemia     Hypertension     Hypothyroidism     PONV (postoperative nausea and vomiting)     Thyroid disease     hypothryoidism       Past Surgical History:   Procedure Laterality Date    APPENDECTOMY      BREAST BIOPSY Right 2015    IDC     SECTION      COLONOSCOPY N/A 2016    Procedure: COLONOSCOPY;  Surgeon: YULIYA Sherman MD;  Location: [facility] ENDO (4TH FLR);  Service: Endoscopy;  Laterality: N/A;    COLONOSCOPY N/A 2024    Procedure: COLONOSCOPY;  Surgeon: Ulices Smith MD;  Location: [facility] ENDO (2ND FLR);  Service: Endoscopy;  Laterality: N/A;  pt stated she aspirated with last colonoscopy- 2nd flr  ref by / [facility]ar [facility] portal-RB  23- Pt r/s- prep ins on portal.- PEG - ERW  -precall complete-MS       Family History   Problem Relation Name Age of Onset    Hypertension Mother      Hyperlipidemia Mother      Breast cancer Mother  60    Cancer Mother          Basal cell skin CA, lymphoma    Depression Mother      Lymphoma Mother      Basal cell carcinoma Mother      Basal cell carcinoma Father      Colon cancer Father      Dementia  "Father      Cancer Father          colon CA and Basal Cell skin CA    Heart failure Father      Hypertension Sister raymundo     Diabetes Sister raymundo     Hypertension Sister denise     Hypertension Sister tatyana     Transient ischemic attack Sister tatyana     Mental illness Son Yuriy         bipolar/schizoaffective    Bipolar disorder Son Yuriy     Diabetes Son Yuriy     Schizophrenia Son Yuriy     Breast cancer Maternal Aunt  50    Cancer Maternal Aunt          Colon CA    Colon cancer Maternal Aunt      Hypertension Paternal Aunt      Diabetes Paternal Aunt      Heart failure Paternal Uncle      Coronary artery disease Maternal Grandfather      Cancer Maternal Cousin      Breast cancer Maternal Cousin      Breast cancer Cousin  49        maternal    Amblyopia Neg Hx      Blindness Neg Hx      Cataracts Neg Hx      Glaucoma Neg Hx      Macular degeneration Neg Hx      Retinal detachment Neg Hx      Strabismus Neg Hx      Stroke Neg Hx      Thyroid disease Neg Hx      Ovarian cancer Neg Hx      Melanoma Neg Hx             Review of Systems:  ROS:  Constitutional: no fever or chills  Eyes: no visual changes  ENT: no nasal congestion or sore throat  Respiratory: no cough or shortness of breath  Cardiovascular: no chest pain or palpitations  Gastrointestinal: no nausea or vomiting, tolerating diet  Genitourinary: no hematuria or dysuria  Integument/Breast: no rash or pruritis  Hematologic/Lymphatic: no easy bruising or lymphadenopathy  Musculoskeletal: no arthralgias or myalgias  Neurological: no seizures or tremors  Behavioral/Psych: no auditory or visual hallucinations  Endocrine: no heat or cold intolerance      OBJECTIVE:     PHYSICAL EXAM:  Vital Signs (Most Recent)  There were no vitals filed for this visit.     ,   Estimated body mass index is 25.18 kg/m² as calculated from the following:    Height as of 8/27/24: 5' 6" (1.676 m).    Weight as of 9/18/24: 70.8 kg (156 lb).   General Appearance: Well nourished, well " developed, in no acute distress.  HENT: Normal cephalic, oropharynx pink and moist  Eyes: PERRLA bilaterally and EOM x 4  Respiratory: Even and unlabored  Skin: Warm and Dry.   Psychiatric: AAO x 4, Mood & affect are normal.    Shoulder exam: left  Tenderness: lateral acromial, posterior acromial, deltoid muscle  ROM:  She can flex and extend the thumb, abduct and adduct all fingers, flexion and extension of the wrist proximally 30°, range of motion of the elbow to 120°.  Shoulder Strength: not tested  non-specific diffuse tenderness about the shoulder, sensory exam normal, and radial pulse intact      RADIOGRAPHS:  Left shoulder x-ray was obtained, findings show a proximal left humerus fracture that appears to be comminuted.  All radiographs were personally reviewed by me.    ASSESSMENT/PLAN:       ICD-10-CM ICD-9-CM   1. Closed displaced fracture of greater tuberosity of left humerus, initial encounter  S42.252A 812.03     -Chelsie Foster presents to clinic with c/c left proximal humerus fracture secondary to a trip and fall on September 18, 2024.  -X-ray as above.  -Recommend RICE therapy.    I will place the patient into a better arm sling.  She will continue to work on range of motion of the fingers, wrist, elbow.  She will remain nonweightbearing to left upper extremity.    I will change her pain medication to oxycodone 5 mg p.r.n., Tylenol 1000 mg 3 times a day p.r.n., Robaxin 500 mg 4 times a day p.r.n..  She will take a stool softener to prevent opioid induced constipation.    I will see the patient back in 2 weeks and repeat x-rays of her left shoulder.  If fracture remains stable we will initiate pendulum swings.

## 2024-10-07 ENCOUNTER — OFFICE VISIT (OUTPATIENT)
Dept: ORTHOPEDICS | Facility: CLINIC | Age: 69
End: 2024-10-07
Payer: MEDICARE

## 2024-10-07 ENCOUNTER — HOSPITAL ENCOUNTER (OUTPATIENT)
Dept: RADIOLOGY | Facility: HOSPITAL | Age: 69
Discharge: HOME | End: 2024-10-07
Attending: NURSE PRACTITIONER
Payer: MEDICARE

## 2024-10-07 DIAGNOSIS — S42.455D CLOSED NONDISPLACED FRACTURE OF LATERAL CONDYLE OF LEFT HUMERUS WITH ROUTINE HEALING, SUBSEQUENT ENCOUNTER: ICD-10-CM

## 2024-10-07 DIAGNOSIS — S42.295D OTHER CLOSED NONDISPLACED FRACTURE OF PROXIMAL END OF LEFT HUMERUS WITH ROUTINE HEALING, SUBSEQUENT ENCOUNTER: Primary | ICD-10-CM

## 2024-10-07 DIAGNOSIS — M25.512 ACUTE PAIN OF LEFT SHOULDER: ICD-10-CM

## 2024-10-07 PROCEDURE — 73060 X-RAY EXAM OF HUMERUS: CPT | Mod: 26,HCNC,LT, | Performed by: RADIOLOGY

## 2024-10-07 PROCEDURE — 99999 PR PBB SHADOW E&M-EST. PATIENT-LVL III: CPT | Mod: PBBFAC,HCNC,, | Performed by: NURSE PRACTITIONER

## 2024-10-07 PROCEDURE — 1101F PT FALLS ASSESS-DOCD LE1/YR: CPT | Mod: HCNC,CPTII,S$GLB, | Performed by: NURSE PRACTITIONER

## 2024-10-07 PROCEDURE — 99213 OFFICE O/P EST LOW 20 MIN: CPT | Mod: HCNC,S$GLB,, | Performed by: NURSE PRACTITIONER

## 2024-10-07 PROCEDURE — 73060 X-RAY EXAM OF HUMERUS: CPT | Mod: TC,HCNC,LT

## 2024-10-07 PROCEDURE — 1159F MED LIST DOCD IN RCRD: CPT | Mod: HCNC,CPTII,S$GLB, | Performed by: NURSE PRACTITIONER

## 2024-10-07 PROCEDURE — 1160F RVW MEDS BY RX/DR IN RCRD: CPT | Mod: HCNC,CPTII,S$GLB, | Performed by: NURSE PRACTITIONER

## 2024-10-07 PROCEDURE — 3288F FALL RISK ASSESSMENT DOCD: CPT | Mod: HCNC,CPTII,S$GLB, | Performed by: NURSE PRACTITIONER

## 2024-10-07 NOTE — PROGRESS NOTES
SUBJECTIVE:     Chief Complaint & History of Present Illness:  Chelsie Foster is a Established 68 y.o. year old female patient for follow-up for a left proximal humerus fracture.  Patient was last seen in clinic on September 20, 2024.  She was placed into a sling, advised to work on range of motion of the fingers, wrist, elbow and to remain nonweightbearing to left upper extremity.  She was asked to follow up in 2 weeks for repeat x-rays.    Currently, the patient reports her pain is slowly improving.  She reports she has been trying to do range of motion of the shoulder even though we explained to her not to do this at the last clinic visit.  She denies any falls or injuries since last clinic visit.  She is using over-the-counter analgesics for mild-to-moderate pain and reserve and opioids when the pain is severe.  Her nausea has resolved as she has been able to have a bowel movement.       Review of patient's allergies indicates:   Allergen Reactions    No known drug allergies          Current Outpatient Medications   Medication Sig Dispense Refill    acetaminophen (TYLENOL) 500 MG tablet Take 2 tablets (1,000 mg total) by mouth every 8 (eight) hours as needed for Pain. 90 tablet 0    augmented betamethasone dipropionate (DIPROLENE-AF) 0.05 % cream Apply topically 2 (two) times daily. (Patient not taking: Reported on 8/16/2024) 50 g 3    diltiaZEM (CARDIZEM CD) 180 MG 24 hr capsule Take 1 capsule (180 mg total) by mouth once daily. 90 capsule 1    EPINEPHrine (EPIPEN) 0.3 mg/0.3 mL AtIn Inject 0.3 mLs (0.3 mg total) into the muscle once. for 1 dose 2 each 1    fluocinonide (LIDEX) 0.05 % ointment Apply topically 2 (two) times daily to rash 60 g 2    levothyroxine (SYNTHROID) 112 MCG tablet TAKE 1 TABLET BY MOUTH ONCE DAILY 90 tablet 3    LORazepam (ATIVAN) 1 MG tablet TAKE 1/2 TO 1 (ONE-HALF TO ONE) TABLET BY MOUTH IN THE EVENING 30 tablet 2    oxyCODONE (ROXICODONE) 5 MG immediate release tablet Take 1 tablet  (5 mg total) by mouth every 6 (six) hours as needed for Pain. 28 tablet 0    pantoprazole (PROTONIX) 40 MG tablet TAKE 1 TABLET BY MOUTH EVERY DAY 90 tablet 3    pravastatin (PRAVACHOL) 80 MG tablet Take 1 tablet (80 mg total) by mouth once daily. 90 tablet 3    triamcinolone acetonide 0.1% (KENALOG) 0.1 % cream Apply topically 2 (two) times daily. 45 g 1    triamcinolone acetonide 0.1% (KENALOG) 0.1 % ointment Apply to affected areas of rash twice a day for 2-4 weeks at a time as needed for rash. 454 g 2    triamterene-hydrochlorothiazide 37.5-25 mg (DYAZIDE) 37.5-25 mg per capsule TAKE 1 CAPSULE BY MOUTH EVERY DAY. 90 capsule 3     No current facility-administered medications for this visit.       Past Medical History:   Diagnosis Date    Anxiety     Basal cell carcinoma of nose 2023    nasal bridge    Breast cancer 2015    Right breast infiltrating ductal CA, ER/[place]/Her2 negative    Depression     Genetic testing     see clinic notes 5-19-15 for details, VUS in [facility]    GERD (gastroesophageal reflux disease)     Hyperlipidemia     Hypertension     Hypothyroidism     PONV (postoperative nausea and vomiting)     Thyroid disease     hypothryoidism       Past Surgical History:   Procedure Laterality Date    APPENDECTOMY      BREAST BIOPSY Right 2015    IDC     SECTION      COLONOSCOPY N/A 2016    Procedure: COLONOSCOPY;  Surgeon: YULIYA Sherman MD;  Location: [facility] (4TH FLR);  Service: Endoscopy;  Laterality: N/A;    COLONOSCOPY N/A 2024    Procedure: COLONOSCOPY;  Surgeon: Ulices Smith MD;  Location: [facility] (2ND FLR);  Service: Endoscopy;  Laterality: N/A;  pt stated she aspirated with last colonoscopy- 2nd flr  ref by / [facility] inst portal-RB  23- Pt r/s- prep ins on portal.- PEG - ERW  -precall complete-MS       Family History   Problem Relation Name Age of Onset    Hypertension Mother      Hyperlipidemia Mother      Breast cancer Mother  60    Cancer  Mother          Basal cell skin CA, lymphoma    Depression Mother      Lymphoma Mother      Basal cell carcinoma Mother      Basal cell carcinoma Father      Colon cancer Father      Dementia Father      Cancer Father          colon CA and Basal Cell skin CA    Heart failure Father      Hypertension Sister raymundo     Diabetes Sister raymundo     Hypertension Sister denise     Hypertension Sister tatyana     Transient ischemic attack Sister tatyana     Mental illness Son Yuriy         bipolar/schizoaffective    Bipolar disorder Son Yuriy     Diabetes Son Yuriy     Schizophrenia Son Yuriy     Breast cancer Maternal Aunt  50    Cancer Maternal Aunt          Colon CA    Colon cancer Maternal Aunt      Hypertension Paternal Aunt      Diabetes Paternal Aunt      Heart failure Paternal Uncle      Coronary artery disease Maternal Grandfather      Cancer Maternal Cousin      Breast cancer Maternal Cousin      Breast cancer Cousin  49        maternal    Amblyopia Neg Hx      Blindness Neg Hx      Cataracts Neg Hx      Glaucoma Neg Hx      Macular degeneration Neg Hx      Retinal detachment Neg Hx      Strabismus Neg Hx      Stroke Neg Hx      Thyroid disease Neg Hx      Ovarian cancer Neg Hx      Melanoma Neg Hx       Review of Systems:  ROS:  Constitutional: no fever or chills  Eyes: no visual changes  ENT: no nasal congestion or sore throat  Respiratory: no cough or shortness of breath  Cardiovascular: no chest pain or palpitations  Gastrointestinal: no nausea or vomiting, tolerating diet  Genitourinary: no hematuria or dysuria  Integument/Breast: no rash or pruritis  Hematologic/Lymphatic: no easy bruising or lymphadenopathy  Musculoskeletal: no arthralgias or myalgias  Neurological: no seizures or tremors  Behavioral/Psych: no auditory or visual hallucinations  Endocrine: no heat or cold intolerance      OBJECTIVE:     PHYSICAL EXAM:  Vital Signs (Most Recent)  There were no vitals filed for this visit.     ,   Estimated body mass  "index is 25.18 kg/m² as calculated from the following:    Height as of 8/27/24: 5' 6" (1.676 m).    Weight as of 9/18/24: 70.8 kg (156 lb).   General Appearance: Well nourished, well developed, in no acute distress.  HENT: Normal cephalic, oropharynx pink and moist  Eyes: PERRLA bilaterally and EOM x 4  Respiratory: Even and unlabored  Skin: Warm and Dry.   Psychiatric: AAO x 4, Mood & affect are normal.    Shoulder exam: left  Tenderness: lateral acromial, posterior acromial, and lateral medial elbow  ROM:  She can flex and extend the thumb, abduct and adduct all fingers, flexion and extension of the wrist proximally 30°, range of motion of the elbow 0 to 130°.  Patient able to perform pendulum swings.  Shoulder Strength: not tested  non-specific diffuse tenderness about the shoulder, sensory exam normal, and radial pulse intact      RADIOGRAPHS:  Left humerus x-ray was obtained, findings show a proximal left humerus fracture that appears to be comminuted.  Fracture component appears to be stable.  There also appears to be avulsions on the medial and lateral condyle.  All radiographs were personally reviewed by me.    ASSESSMENT/PLAN:       ICD-10-CM ICD-9-CM   1. Other closed nondisplaced fracture of proximal end of left humerus with routine healing, subsequent encounter  S42.295D V54.11   2. Closed nondisplaced fracture of lateral condyle of left humerus with routine healing, subsequent encounter  S42.455D V54.11       -Chelsie Foster presents to clinic with c/c left proximal humerus fracture an avulsion to her humeral condyle secondary to a trip and fall on September 18, 2024.  -X-ray as above.  -Recommend RICE therapy.    I will keep the patient in the arm sling.  Ok to remove for ROM of the hand, fingers, wrist.  She may start pendulum swings.  She will remain nonweightbearing to left upper extremity.    Recommend she continue taking the medication given to her at the last visit.  I advised her to use " over-the-counter analgesics prior to any opioid medication.    I will see the patient back in 2 weeks and repeat x-rays of her left humerus.  If fracture remains stable I will refer her to physical therapy to initiate a shoulder rehab protocol.

## 2024-10-16 RX ORDER — PANTOPRAZOLE SODIUM 40 MG/1
TABLET, DELAYED RELEASE ORAL
Qty: 90 TABLET | Refills: 3 | Status: SHIPPED | OUTPATIENT
Start: 2024-10-16

## 2024-10-16 RX ORDER — PANTOPRAZOLE SODIUM 40 MG/1
TABLET, DELAYED RELEASE ORAL
Qty: 90 TABLET | Refills: 3 | Status: CANCELLED | OUTPATIENT
Start: 2024-10-16

## 2024-10-16 NOTE — TELEPHONE ENCOUNTER
No care due was identified.  Health [facility] Embedded Care Due Messages. Reference number: [id number].   10/16/2024 9:13:53 AM CDT

## 2024-10-16 NOTE — TELEPHONE ENCOUNTER
No care due was identified.  Health [facility] Embedded Care Due Messages. Reference number: [id number].   10/16/2024 8:26:41 AM CDT

## 2024-10-16 NOTE — TELEPHONE ENCOUNTER
Refill Decision Note   Chelsie Foster  is requesting a refill authorization.  Brief Assessment and Rationale for Refill:  Approve     Medication Therapy Plan: ED visit reviewed, no change to therapy. FOVS 10/17/24      Extended chart review required: Yes   Comments:     Note composed:2:29 PM 10/16/2024

## 2024-10-17 ENCOUNTER — IMMUNIZATION (OUTPATIENT)
Dept: INTERNAL MEDICINE | Facility: CLINIC | Age: 69
End: 2024-10-17
Payer: MEDICARE

## 2024-10-17 ENCOUNTER — OFFICE VISIT (OUTPATIENT)
Dept: INTERNAL MEDICINE | Facility: CLINIC | Age: 69
End: 2024-10-17
Payer: MEDICARE

## 2024-10-17 VITALS
BODY MASS INDEX: 25.75 KG/M2 | HEART RATE: 85 BPM | WEIGHT: 160.25 LBS | SYSTOLIC BLOOD PRESSURE: 128 MMHG | OXYGEN SATURATION: 97 % | DIASTOLIC BLOOD PRESSURE: 78 MMHG | HEIGHT: 66 IN

## 2024-10-17 DIAGNOSIS — M81.0 OSTEOPOROSIS, UNSPECIFIED OSTEOPOROSIS TYPE, UNSPECIFIED PATHOLOGICAL FRACTURE PRESENCE: ICD-10-CM

## 2024-10-17 DIAGNOSIS — Z00.00 ANNUAL PHYSICAL EXAM: Primary | ICD-10-CM

## 2024-10-17 DIAGNOSIS — R79.9 ABNORMAL FINDING OF BLOOD CHEMISTRY: ICD-10-CM

## 2024-10-17 DIAGNOSIS — E87.6 HYPOKALEMIA: ICD-10-CM

## 2024-10-17 DIAGNOSIS — E03.4 HYPOTHYROIDISM DUE TO ACQUIRED ATROPHY OF THYROID: ICD-10-CM

## 2024-10-17 DIAGNOSIS — Z23 NEED FOR VACCINATION: Primary | ICD-10-CM

## 2024-10-17 DIAGNOSIS — S42.202D CLOSED FRACTURE OF PROXIMAL END OF LEFT HUMERUS WITH ROUTINE HEALING, UNSPECIFIED FRACTURE MORPHOLOGY, SUBSEQUENT ENCOUNTER: ICD-10-CM

## 2024-10-17 PROCEDURE — 99999 PR PBB SHADOW E&M-EST. PATIENT-LVL V: CPT | Mod: PBBFAC,HCNC,, | Performed by: INTERNAL MEDICINE

## 2024-10-17 RX ORDER — POTASSIUM CHLORIDE 20 MEQ/1
20 TABLET, EXTENDED RELEASE ORAL 2 TIMES DAILY
Qty: 90 TABLET | Refills: 3 | Status: SHIPPED | OUTPATIENT
Start: 2024-10-17

## 2024-10-17 NOTE — PATIENT INSTRUCTIONS
Add potassium tab daily    Osteoporosis:  Recommended therapies are daily weight bearing exercise,  Calcium 600 mg twice a day, and Vit D 800-1000 i.u daily.     Alendronate if vit D normal.

## 2024-10-17 NOTE — PROGRESS NOTES
Subjective     Patient ID: Chelsie Foster is a 69 y.o. female.    Chief Complaint: Annual Exam    HPI  See last note.    Htn controlled today.     Palpitations a little worse, despite taking Cardizem..    Stress test normal..  No CP.    9/18:  tripped on tree root.    Fractured nose and humerus tx non surgically, with a sling.    Last dexa :  minimal osteopenia with low fracture risk.    Rarely, meds stick in throat.      M with osteoporosis and hip fracture.    Hypothyroidism.  Review of Systems   Constitutional:  Negative for fever and unexpected weight change.   HENT:  Negative for nasal congestion and postnasal drip.    Respiratory:  Negative for chest tightness, shortness of breath and wheezing.    Cardiovascular:  Negative for chest pain and leg swelling.   Gastrointestinal:  Negative for abdominal pain, anal bleeding, constipation, diarrhea, nausea and vomiting.   Genitourinary:  Negative for dysuria and urgency.   Integumentary:  Negative for rash.   Neurological:  Negative for headaches.   Psychiatric/Behavioral:  Negative for dysphoric mood and sleep disturbance. The patient is not nervous/anxious.           Objective     Physical Exam  Constitutional:       General: She is not in acute distress.     Appearance: She is well-developed.   HENT:      Head: Normocephalic and atraumatic.      Right Ear: External ear normal.      Left Ear: External ear normal.      Nose: Nose normal.   Eyes:      General: No scleral icterus.        Right eye: No discharge.         Left eye: No discharge.      Conjunctiva/sclera: Conjunctivae normal.      Pupils: Pupils are equal, round, and reactive to light.   Neck:      Thyroid: No thyromegaly.      Vascular: No JVD.   Cardiovascular:      Rate and Rhythm: Normal rate and regular rhythm.      Heart sounds: Normal heart sounds. No murmur heard.     No gallop.   Pulmonary:      Effort: Pulmonary effort is normal. No respiratory distress.      Breath sounds: Normal breath  sounds. No wheezing or rales.   Abdominal:      General: Bowel sounds are normal. There is no distension.      Palpations: Abdomen is soft. There is no mass.      Tenderness: There is no abdominal tenderness. There is no guarding or rebound.   Musculoskeletal:         General: No tenderness. Normal range of motion.      Cervical back: Normal range of motion and neck supple.   Lymphadenopathy:      Cervical: No cervical adenopathy.   Skin:     General: Skin is warm and dry.      Findings: No rash.   Neurological:      Mental Status: She is alert and oriented to person, place, and time.      Cranial Nerves: No cranial nerve deficit.      Coordination: Coordination normal.   Psychiatric:         Behavior: Behavior normal.         Thought Content: Thought content normal.         Judgment: Judgment normal.            Results for orders placed or performed in visit on 10/10/24   Basic Metabolic Panel    Collection Time: 10/10/24  9:27 AM   Result Value Ref Range    Sodium 139 136 - 145 mmol/L    Potassium 3.2 (L) 3.5 - 5.1 mmol/L    Chloride 100 95 - 110 mmol/L    CO2 30 (H) 23 - 29 mmol/L    Glucose 107 70 - 110 mg/dL    BUN 15 8 - 23 mg/dL    Creatinine 0.8 0.5 - 1.4 mg/dL    Calcium 10.0 8.7 - 10.5 mg/dL    Anion Gap 9 8 - 16 mmol/L    eGFR >60.0 >60 mL/min/1.73 m^2   Lipid Panel    Collection Time: 10/10/24  9:27 AM   Result Value Ref Range    Cholesterol 194 120 - 199 mg/dL    Triglycerides 200 (H) 30 - 150 mg/dL    HDL 48 40 - 75 mg/dL    LDL Cholesterol 106.0 63.0 - 159.0 mg/dL    HDL/Cholesterol Ratio 24.7 20.0 - 50.0 %    Total Cholesterol/HDL Ratio 4.0 2.0 - 5.0    Non-HDL Cholesterol 146 mg/dL   ALT (SGPT)    Collection Time: 10/10/24  9:27 AM   Result Value Ref Range    ALT 22 10 - 44 U/L   AST (SGOT)    Collection Time: 10/10/24  9:27 AM   Result Value Ref Range    AST 18 10 - 40 U/L     *Note: Due to a large number of results and/or encounters for the requested time period, some results have not been  displayed. A complete set of results can be found in Results Review.      Assessment and Plan     1. Annual physical exam    2. Closed fracture of proximal end of left humerus with routine healing, unspecified fracture morphology, subsequent encounter  -     Vitamin D; Future; Expected date: 10/17/2024    3. Hypokalemia  -     potassium chloride SA (K-DUR,KLOR-CON) 20 MEQ tablet; Take 1 tablet (20 mEq total) by mouth 2 (two) times daily.  Dispense: 90 tablet; Refill: 3  -     Basic Metabolic Panel; Future; Expected date: 10/17/2024    4. Osteoporosis, unspecified osteoporosis type, unspecified pathological fracture presence  -     Vitamin D; Future; Expected date: 10/17/2024    5. Abnormal finding of blood chemistry  -     Hemoglobin A1C; Future; Expected date: 01/15/2025    6. Hypothyroidism due to acquired atrophy of thyroid          Increase cardizem if palps do not lessen.  Fosamax if vit D ok  Start K supplement  Flu vax.  Declines Covid.       Follow up in about 1 year (around 10/17/2025).

## 2024-10-24 ENCOUNTER — HOSPITAL ENCOUNTER (OUTPATIENT)
Dept: RADIOLOGY | Facility: HOSPITAL | Age: 69
Discharge: HOME | End: 2024-10-24
Attending: NURSE PRACTITIONER
Payer: MEDICARE

## 2024-10-24 ENCOUNTER — OFFICE VISIT (OUTPATIENT)
Dept: ORTHOPEDICS | Facility: CLINIC | Age: 69
End: 2024-10-24
Payer: MEDICARE

## 2024-10-24 DIAGNOSIS — S42.455D CLOSED NONDISPLACED FRACTURE OF LATERAL CONDYLE OF LEFT HUMERUS WITH ROUTINE HEALING, SUBSEQUENT ENCOUNTER: Primary | ICD-10-CM

## 2024-10-24 DIAGNOSIS — S42.455D CLOSED NONDISPLACED FRACTURE OF LATERAL CONDYLE OF LEFT HUMERUS WITH ROUTINE HEALING, SUBSEQUENT ENCOUNTER: ICD-10-CM

## 2024-10-24 DIAGNOSIS — S42.295D OTHER CLOSED NONDISPLACED FRACTURE OF PROXIMAL END OF LEFT HUMERUS WITH ROUTINE HEALING, SUBSEQUENT ENCOUNTER: ICD-10-CM

## 2024-10-24 PROCEDURE — 73060 X-RAY EXAM OF HUMERUS: CPT | Mod: 26,HCNC,LT, | Performed by: RADIOLOGY

## 2024-10-24 PROCEDURE — 99999 PR PBB SHADOW E&M-EST. PATIENT-LVL III: CPT | Mod: PBBFAC,HCNC,, | Performed by: NURSE PRACTITIONER

## 2024-10-24 PROCEDURE — 73060 X-RAY EXAM OF HUMERUS: CPT | Mod: TC,HCNC,LT

## 2024-10-24 NOTE — PROGRESS NOTES
SUBJECTIVE:     Chief Complaint & History of Present Illness:  Chelsie Foster is a Established 69 y.o. year old female patient for follow-up for a left proximal humerus fracture.  Patient was last seen in clinic on October 7, 2024.  She was placed into a sling, advised to work on range of motion of the fingers, wrist, elbow and to remain nonweightbearing to left upper extremity.  She was asked to follow up in 2 weeks for repeat x-rays.     Currently, the patient reports her pain is slowly improving.  He pain is a 3/10 currently. She states that the ROM of he shoulder and continuing to improve. She is performing all the movements/ROM of the right upper extremity as discussed in the last visit.  She denies any falls or injuries since last clinic visit.  She is using over-the-counter analgesics for mild-to-moderate pain and reserve and opioids when the pain is severe.  Patient states she is no longer using a sling.       Review of patient's allergies indicates:   Allergen Reactions    No known drug allergies          Current Outpatient Medications   Medication Sig Dispense Refill    acetaminophen (TYLENOL) 500 MG tablet Take 2 tablets (1,000 mg total) by mouth every 8 (eight) hours as needed for Pain. 90 tablet 0    augmented betamethasone dipropionate (DIPROLENE-AF) 0.05 % cream Apply topically 2 (two) times daily. 50 g 3    diltiaZEM (CARDIZEM CD) 180 MG 24 hr capsule Take 1 capsule (180 mg total) by mouth once daily. 90 capsule 1    EPINEPHrine (EPIPEN) 0.3 mg/0.3 mL AtIn Inject 0.3 mLs (0.3 mg total) into the muscle once. for 1 dose 2 each 1    fluocinonide (LIDEX) 0.05 % ointment Apply topically 2 (two) times daily to rash 60 g 2    levothyroxine (SYNTHROID) 112 MCG tablet TAKE 1 TABLET BY MOUTH ONCE DAILY 90 tablet 3    LORazepam (ATIVAN) 1 MG tablet TAKE 1/2 TO 1 (ONE-HALF TO ONE) TABLET BY MOUTH IN THE EVENING 30 tablet 2    oxyCODONE (ROXICODONE) 5 MG immediate release tablet Take 1 tablet (5 mg total) by  mouth every 6 (six) hours as needed for Pain. (Patient not taking: Reported on 10/17/2024) 28 tablet 0    pantoprazole (PROTONIX) 40 MG tablet TAKE 1 TABLET BY MOUTH EVERY DAY 90 tablet 3    potassium chloride SA (K-DUR,KLOR-CON) 20 MEQ tablet Take 1 tablet (20 mEq total) by mouth 2 (two) times daily. 90 tablet 3    pravastatin (PRAVACHOL) 80 MG tablet Take 1 tablet (80 mg total) by mouth once daily. 90 tablet 3    triamcinolone acetonide 0.1% (KENALOG) 0.1 % cream Apply topically 2 (two) times daily. 45 g 1    triamcinolone acetonide 0.1% (KENALOG) 0.1 % ointment Apply to affected areas of rash twice a day for 2-4 weeks at a time as needed for rash. 454 g 2    triamterene-hydrochlorothiazide 37.5-25 mg (DYAZIDE) 37.5-25 mg per capsule TAKE 1 CAPSULE BY MOUTH EVERY DAY. 90 capsule 3     No current facility-administered medications for this visit.       Past Medical History:   Diagnosis Date    Anxiety     Basal cell carcinoma of nose 2023    nasal bridge    Breast cancer 2015    Right breast infiltrating ductal CA, ER/NH/Her2 negative    Depression     Genetic testing     see clinic notes 5-19-15 for details, VUS in [facility]    GERD (gastroesophageal reflux disease)     Hyperlipidemia     Hypertension     Hypothyroidism     PONV (postoperative nausea and vomiting)     Thyroid disease     hypothryoidism       Past Surgical History:   Procedure Laterality Date    APPENDECTOMY      BREAST BIOPSY Right 2015    IDC     SECTION      COLONOSCOPY N/A 2016    Procedure: COLONOSCOPY;  Surgeon: YULIYA Sherman MD;  Location: [facility] (4TH FLR);  Service: Endoscopy;  Laterality: N/A;    COLONOSCOPY N/A 2024    Procedure: COLONOSCOPY;  Surgeon: Ulices Smith MD;  Location: [facility] ENDO (2ND FLR);  Service: Endoscopy;  Laterality: N/A;  pt stated she aspirated with last colonoscopy- 2nd flr  ref by / cesar inst portal-RB  23- Pt r/s- prep ins on portal.- PEG - ERW  -precall  complete-MS       Family History   Problem Relation Name Age of Onset    Hypertension Mother      Hyperlipidemia Mother      Breast cancer Mother  60    Cancer Mother          Basal cell skin CA, lymphoma    Depression Mother      Lymphoma Mother      Basal cell carcinoma Mother      Osteoporosis Mother      Basal cell carcinoma Father      Colon cancer Father      Dementia Father      Cancer Father          colon CA and Basal Cell skin CA    Heart failure Father      Hypertension Sister raymundo     Diabetes Sister raymundo     Hypertension Sister denise     Hypertension Sister tatyana     Transient ischemic attack Sister tatyana     Mental illness Son Yuriy         bipolar/schizoaffective    Bipolar disorder Son Yuriy     Diabetes Son Yuriy     Schizophrenia Son Yuriy     Breast cancer Maternal Aunt  50    Cancer Maternal Aunt          Colon CA    Colon cancer Maternal Aunt      Hypertension Paternal Aunt      Diabetes Paternal Aunt      Heart failure Paternal Uncle      Coronary artery disease Maternal Grandfather      Cancer Maternal Cousin      Breast cancer Maternal Cousin      Breast cancer Cousin  49        maternal    Amblyopia Neg Hx      Blindness Neg Hx      Cataracts Neg Hx      Glaucoma Neg Hx      Macular degeneration Neg Hx      Retinal detachment Neg Hx      Strabismus Neg Hx      Stroke Neg Hx      Thyroid disease Neg Hx      Ovarian cancer Neg Hx      Melanoma Neg Hx       Review of Systems:  ROS:  Constitutional: no fever or chills  Eyes: no visual changes  ENT: no nasal congestion or sore throat  Respiratory: no cough or shortness of breath  Cardiovascular: no chest pain or palpitations  Gastrointestinal: no nausea or vomiting, tolerating diet  Genitourinary: no hematuria or dysuria  Integument/Breast: no rash or pruritis  Hematologic/Lymphatic: no easy bruising or lymphadenopathy  Musculoskeletal: no arthralgias or myalgias  Neurological: no seizures or tremors  Behavioral/Psych: no auditory or visual  "hallucinations  Endocrine: no heat or cold intolerance      OBJECTIVE:     PHYSICAL EXAM:  Vital Signs (Most Recent)  There were no vitals filed for this visit.     ,   Estimated body mass index is 25.87 kg/m² as calculated from the following:    Height as of 10/17/24: 5' 6" (1.676 m).    Weight as of 10/17/24: 72.7 kg (160 lb 4.4 oz).   General Appearance: Well nourished, well developed, in no acute distress.  HENT: Normal cephalic, oropharynx pink and moist  Eyes: PERRLA bilaterally and EOM x 4  Respiratory: Even and unlabored  Skin: Warm and Dry.   Psychiatric: AAO x 4, Mood & affect are normal.    Shoulder exam: left  Tenderness: lateral acromial, posterior acromial, and lateral medial elbow  ROM:  She can flex and extend the thumb, abduct and adduct all fingers, flexion and extension of the wrist proximally 30°, range of motion of the elbow 0 to 130°.  Patient able to perform pendulum swings. ROM of shoulder limited due to pain. Able to flex her shoulder to approximately °, extension 40-45°, abduction 70-80°  Shoulder Strength: not tested  non-specific diffuse tenderness about the shoulder, sensory exam normal, and radial pulse intact      RADIOGRAPHS:  Left humerus x-ray: Comminuted impacted fracture with some angulation of the left humeral neck with callus. Soft tissue calcifications superior aspect of the humeral head again noted. More distal bone is intact.   All radiographs were personally reviewed by me.    ASSESSMENT/PLAN:       ICD-10-CM ICD-9-CM   1. Closed nondisplaced fracture of lateral condyle of left humerus with routine healing, subsequent encounter  S42.455D V54.11   2. Other closed nondisplaced fracture of proximal end of left humerus with routine healing, subsequent encounter  S42.295D V54.11       -Chelsie Foster presents to clinic with c/c left proximal humerus fracture an avulsion to her humeral condyle secondary to a trip and fall on September 18, 2024.  -X-ray as " above.  -Recommend RICE therapy.    Patient is no longer using arm sling.  Informed patient to continue ROM of the hand, fingers, wrist.  She may continue pendulum swings.  She will remain nonweightbearing to left upper extremity.    Recommend she continue taking the medication given to her at the last visit.  I advised her to use over-the-counter analgesics prior to any opioid medication.    I will see the patient back in 2 weeks and repeat x-rays of her left humerus.  If fracture remains stable I will refer her to physical therapy to initiate a shoulder rehab protocol.    Patient is seen and evaluated with the PA student.  69-year-old female who presents to the clinic for follow-up for a left proximal humerus fracture secondary to a fall back in September.  He has been following in the clinic every 2 weeks for repeat x-rays to make sure the fracture has remained stable.  Overall she reports her pain is slowly improving.  She has been working on range of motion of her fingers, wrist, elbow and pendulum swings as previously indicated.  On review of her x-ray, x-ray appears to be stable.  There appears to be some callus formation now present.  There is mild angulation of the fracture component.  I am going to continue having her work on range of motion of the fingers, wrist, elbow and pendulum swings.  I will see her back in 2 weeks and repeat x-rays.  At that point if fracture remains stable we will refer her to physical therapy to initiate a shoulder rehab protocol.

## 2024-10-29 DIAGNOSIS — E03.4 HYPOTHYROIDISM DUE TO ACQUIRED ATROPHY OF THYROID: ICD-10-CM

## 2024-10-29 RX ORDER — LEVOTHYROXINE SODIUM 112 UG/1
112 TABLET ORAL DAILY
Qty: 90 TABLET | Refills: 3 | Status: SHIPPED | OUTPATIENT
Start: 2024-10-29

## 2024-10-30 DIAGNOSIS — F41.9 ANXIETY: Primary | ICD-10-CM

## 2024-10-30 RX ORDER — LORAZEPAM 1 MG/1
TABLET ORAL
Qty: 30 TABLET | Refills: 2 | Status: SHIPPED | OUTPATIENT
Start: 2024-10-30

## 2024-11-07 ENCOUNTER — HOSPITAL ENCOUNTER (OUTPATIENT)
Dept: RADIOLOGY | Facility: HOSPITAL | Age: 69
Discharge: HOME | End: 2024-11-07
Attending: NURSE PRACTITIONER
Payer: MEDICARE

## 2024-11-07 ENCOUNTER — OFFICE VISIT (OUTPATIENT)
Dept: ORTHOPEDICS | Facility: CLINIC | Age: 69
End: 2024-11-07
Payer: MEDICARE

## 2024-11-07 DIAGNOSIS — M25.512 ACUTE PAIN OF LEFT SHOULDER: Primary | ICD-10-CM

## 2024-11-07 DIAGNOSIS — M25.512 ACUTE PAIN OF LEFT SHOULDER: ICD-10-CM

## 2024-11-07 DIAGNOSIS — S42.295D OTHER CLOSED NONDISPLACED FRACTURE OF PROXIMAL END OF LEFT HUMERUS WITH ROUTINE HEALING, SUBSEQUENT ENCOUNTER: Primary | ICD-10-CM

## 2024-11-07 PROCEDURE — 73060 X-RAY EXAM OF HUMERUS: CPT | Mod: TC,HCNC,LT

## 2024-11-07 PROCEDURE — 1101F PT FALLS ASSESS-DOCD LE1/YR: CPT | Mod: HCNC,CPTII,S$GLB, | Performed by: NURSE PRACTITIONER

## 2024-11-07 PROCEDURE — 99213 OFFICE O/P EST LOW 20 MIN: CPT | Mod: HCNC,S$GLB,, | Performed by: NURSE PRACTITIONER

## 2024-11-07 PROCEDURE — 1160F RVW MEDS BY RX/DR IN RCRD: CPT | Mod: HCNC,CPTII,S$GLB, | Performed by: NURSE PRACTITIONER

## 2024-11-07 PROCEDURE — 99999 PR PBB SHADOW E&M-EST. PATIENT-LVL III: CPT | Mod: PBBFAC,HCNC,, | Performed by: NURSE PRACTITIONER

## 2024-11-07 PROCEDURE — 73060 X-RAY EXAM OF HUMERUS: CPT | Mod: 26,HCNC,LT, | Performed by: RADIOLOGY

## 2024-11-07 PROCEDURE — 3288F FALL RISK ASSESSMENT DOCD: CPT | Mod: HCNC,CPTII,S$GLB, | Performed by: NURSE PRACTITIONER

## 2024-11-07 PROCEDURE — 1125F AMNT PAIN NOTED PAIN PRSNT: CPT | Mod: HCNC,CPTII,S$GLB, | Performed by: NURSE PRACTITIONER

## 2024-11-07 PROCEDURE — 1159F MED LIST DOCD IN RCRD: CPT | Mod: HCNC,CPTII,S$GLB, | Performed by: NURSE PRACTITIONER

## 2024-11-07 NOTE — PROGRESS NOTES
SUBJECTIVE:     Chief Complaint & History of Present Illness:  Chelsie Foster is a Established 69 y.o. year old female patient for follow-up for a left proximal humerus fracture.  Patient was last seen in clinic on October 24, 2024.  She was told to continue working on range of motion of her fingers, wrist, elbow and to initiate pendulum swings.  She was asked to remain nonweightbearing to left upper extremity and follow up in 2 weeks for repeat imaging.    Currently, the patient reports her pain is improving.  She is using over-the-counter analgesics p.r.n..  She has been working on her range of motion of her fingers, wrist, elbow and pendulum swings.  She denies any falls or injuries since last seen in the clinic.  Denies any lower arm numbness or tingling sensation.      Review of patient's allergies indicates:   Allergen Reactions    No known drug allergies          Current Outpatient Medications   Medication Sig Dispense Refill    acetaminophen (TYLENOL) 500 MG tablet Take 2 tablets (1,000 mg total) by mouth every 8 (eight) hours as needed for Pain. 90 tablet 0    augmented betamethasone dipropionate (DIPROLENE-AF) 0.05 % cream Apply topically 2 (two) times daily. 50 g 3    diltiaZEM (CARDIZEM CD) 180 MG 24 hr capsule Take 1 capsule (180 mg total) by mouth once daily. 90 capsule 1    EPINEPHrine (EPIPEN) 0.3 mg/0.3 mL AtIn Inject 0.3 mLs (0.3 mg total) into the muscle once. for 1 dose 2 each 1    fluocinonide (LIDEX) 0.05 % ointment Apply topically 2 (two) times daily to rash 60 g 2    levothyroxine (SYNTHROID) 112 MCG tablet TAKE 1 TABLET BY MOUTH ONCE DAILY 90 tablet 3    LORazepam (ATIVAN) 1 MG tablet TAKE 1/2 TO 1 (ONE-HALF TO ONE) TABLET BY MOUTH IN THE EVENING 30 tablet 2    oxyCODONE (ROXICODONE) 5 MG immediate release tablet Take 1 tablet (5 mg total) by mouth every 6 (six) hours as needed for Pain. (Patient not taking: Reported on 10/17/2024) 28 tablet 0    pantoprazole (PROTONIX) 40 MG tablet TAKE  1 TABLET BY MOUTH EVERY DAY 90 tablet 3    potassium chloride SA (K-DUR,KLOR-CON) 20 MEQ tablet Take 1 tablet (20 mEq total) by mouth 2 (two) times daily. 90 tablet 3    pravastatin (PRAVACHOL) 80 MG tablet Take 1 tablet (80 mg total) by mouth once daily. 90 tablet 3    triamcinolone acetonide 0.1% (KENALOG) 0.1 % cream Apply topically 2 (two) times daily. 45 g 1    triamcinolone acetonide 0.1% (KENALOG) 0.1 % ointment Apply to affected areas of rash twice a day for 2-4 weeks at a time as needed for rash. 454 g 2    triamterene-hydrochlorothiazide 37.5-25 mg (DYAZIDE) 37.5-25 mg per capsule TAKE 1 CAPSULE BY MOUTH EVERY DAY. 90 capsule 3     No current facility-administered medications for this visit.       Past Medical History:   Diagnosis Date    Anxiety     Basal cell carcinoma of nose 2023    nasal bridge    Breast cancer 2015    Right breast infiltrating ductal CA, ER/DE/Her2 negative    Depression     Genetic testing     see clinic notes 5-19-15 for details, VUS in [facility]    GERD (gastroesophageal reflux disease)     Hyperlipidemia     Hypertension     Hypothyroidism     PONV (postoperative nausea and vomiting)     Thyroid disease     hypothryoidism       Past Surgical History:   Procedure Laterality Date    APPENDECTOMY      BREAST BIOPSY Right 2015    IDC     SECTION      COLONOSCOPY N/A 2016    Procedure: COLONOSCOPY;  Surgeon: YULIYA Sherman MD;  Location: [facility] (4TH FLR);  Service: Endoscopy;  Laterality: N/A;    COLONOSCOPY N/A 2024    Procedure: COLONOSCOPY;  Surgeon: Ulices Smith MD;  Location: [facility] (2ND FLR);  Service: Endoscopy;  Laterality: N/A;  pt stated she aspirated with last colonoscopy- 2nd flr  ref by / cesar [facility] portal-RB  23- Pt r/s- prep ins on portal.- PEG - ERW  -precall complete-MS       Family History   Problem Relation Name Age of Onset    Hypertension Mother      Hyperlipidemia Mother      Breast cancer Mother  60     Cancer Mother          Basal cell skin CA, lymphoma    Depression Mother      Lymphoma Mother      Basal cell carcinoma Mother      Osteoporosis Mother      Basal cell carcinoma Father      Colon cancer Father      Dementia Father      Cancer Father          colon CA and Basal Cell skin CA    Heart failure Father      Hypertension Sister raymundo     Diabetes Sister raymundo     Hypertension Sister denise     Hypertension Sister tatyana     Transient ischemic attack Sister tatyana     Mental illness Son Yuriy         bipolar/schizoaffective    Bipolar disorder Son Yuriy     Diabetes Son Yuriy     Schizophrenia Son Yuriy     Breast cancer Maternal Aunt  50    Cancer Maternal Aunt          Colon CA    Colon cancer Maternal Aunt      Hypertension Paternal Aunt      Diabetes Paternal Aunt      Heart failure Paternal Uncle      Coronary artery disease Maternal Grandfather      Cancer Maternal Cousin      Breast cancer Maternal Cousin      Breast cancer Cousin  49        maternal    Amblyopia Neg Hx      Blindness Neg Hx      Cataracts Neg Hx      Glaucoma Neg Hx      Macular degeneration Neg Hx      Retinal detachment Neg Hx      Strabismus Neg Hx      Stroke Neg Hx      Thyroid disease Neg Hx      Ovarian cancer Neg Hx      Melanoma Neg Hx       Review of Systems:  ROS:  Constitutional: no fever or chills  Eyes: no visual changes  ENT: no nasal congestion or sore throat  Respiratory: no cough or shortness of breath  Cardiovascular: no chest pain or palpitations  Gastrointestinal: no nausea or vomiting, tolerating diet  Genitourinary: no hematuria or dysuria  Integument/Breast: no rash or pruritis  Hematologic/Lymphatic: no easy bruising or lymphadenopathy  Musculoskeletal: no arthralgias or myalgias  Neurological: no seizures or tremors  Behavioral/Psych: no auditory or visual hallucinations  Endocrine: no heat or cold intolerance      OBJECTIVE:     PHYSICAL EXAM:  Vital Signs (Most Recent)  There were no vitals filed for this  "visit.     ,   Estimated body mass index is 25.87 kg/m² as calculated from the following:    Height as of 10/17/24: 5' 6" (1.676 m).    Weight as of 10/17/24: 72.7 kg (160 lb 4.4 oz).   General Appearance: Well nourished, well developed, in no acute distress.  HENT: Normal cephalic, oropharynx pink and moist  Eyes: PERRLA bilaterally and EOM x 4  Respiratory: Even and unlabored  Skin: Warm and Dry.   Psychiatric: AAO x 4, Mood & affect are normal.    Shoulder exam: left  Tenderness:  None  ROM:  She can flex and extend the thumb, abduct and adduct all fingers, flexion and extension of the wrist proximally 30°, range of motion of the elbow 0 to 130°.  Patient able to perform pendulum swings.  For flexion passive range of motion of the shoulder is 0 to 95°.  Shoulder Strength:  4/5  normal active ROM, no tenderness, no impingement sign      RADIOGRAPHS:  Left humerus x-ray:  There is a comminuted impacted fracture with some angulation of the left humeral neck.  There appears to be more callus formation present when compared to prior x-ray.  No new fractures seen.   All radiographs were personally reviewed by me.    ASSESSMENT/PLAN:       ICD-10-CM ICD-9-CM   1. Other closed nondisplaced fracture of proximal end of left humerus with routine healing, subsequent encounter  S42.295D V54.11       -Chelsie Foster presents to clinic with c/c left proximal humerus fracture an avulsion to her humeral condyle secondary to a trip and fall on September 18, 2024.  -X-ray as above.    I discussed the x-ray findings with the patient.  The avulsion fracture at the humeral condyle is no longer seen.  Fracture of the proximal humerus appears to be stable.  Patient will continue doing range of motion of her fingers, wrist, elbow and pendulum swings I will refer her to physical therapy to initiate a shoulder rehab protocol.    Patient to follow up in the clinic in 6 weeks with repeat x-ray of the left shoulder.  If fracture remains " stable we will consider discharging.

## 2024-11-10 PROBLEM — E55.9 VITAMIN D DEFICIENCY: Status: ACTIVE | Noted: 2024-11-10

## 2024-11-11 ENCOUNTER — TELEPHONE (OUTPATIENT)
Dept: INTERNAL MEDICINE | Facility: CLINIC | Age: 69
End: 2024-11-11
Payer: MEDICARE

## 2024-11-26 ENCOUNTER — TELEPHONE (OUTPATIENT)
Dept: OPTOMETRY | Facility: CLINIC | Age: 69
End: 2024-11-26
Payer: MEDICARE

## 2024-11-26 NOTE — TELEPHONE ENCOUNTER
----- Message from Mando So sent at 11/25/2024 11:17 AM CST -----    ----- Message -----  From: Haydee Trejo  Sent: 11/25/2024  10:28 AM CST  To: [facility] Guerra Staff    Good Morning, can you extend her contact RX for her. She is a Dr Ortiz patient.    Thanks,  Haydee

## 2024-11-27 PROBLEM — Z78.9 IMPAIRED MOBILITY AND ACTIVITIES OF DAILY LIVING: Status: ACTIVE | Noted: 2024-11-27

## 2024-11-27 PROBLEM — R53.1 DECREASED RANGE OF MOTION WITH DECREASED STRENGTH: Status: ACTIVE | Noted: 2024-11-27

## 2024-11-27 PROBLEM — M25.60 DECREASED RANGE OF MOTION WITH DECREASED STRENGTH: Status: ACTIVE | Noted: 2024-11-27

## 2024-11-27 PROBLEM — Z74.09 IMPAIRED MOBILITY AND ACTIVITIES OF DAILY LIVING: Status: ACTIVE | Noted: 2024-11-27

## 2024-12-18 ENCOUNTER — PATIENT MESSAGE (OUTPATIENT)
Dept: OPTOMETRY | Facility: CLINIC | Age: 69
End: 2024-12-18
Payer: MEDICARE

## 2024-12-20 ENCOUNTER — TELEPHONE (OUTPATIENT)
Dept: ORTHOPEDICS | Facility: CLINIC | Age: 69
End: 2024-12-20
Payer: MEDICARE

## 2025-01-13 ENCOUNTER — TELEPHONE (OUTPATIENT)
Dept: ORTHOPEDICS | Facility: CLINIC | Age: 70
End: 2025-01-13
Payer: MEDICARE

## 2025-01-13 NOTE — PROGRESS NOTES
Called and confirm pt's appt with Caesar at 11 am. Pt says she has PT at 11 am and will come in at 1 pm. Informed pt I will make a note of it.

## 2025-01-13 NOTE — TELEPHONE ENCOUNTER
----- Message from Quyen sent at 1/13/2025 10:13 AM CST -----  Regarding: PT IS WANTING A CALL BACK REGARDING COMING AT A EARLIER TIME FOR SCHEDULED APPT ON 1/16  Contact: PT  Confirmed contact info below:  Contact Name: Chelsie Foster  Phone Number: [phone]

## 2025-01-16 ENCOUNTER — OFFICE VISIT (OUTPATIENT)
Dept: ORTHOPEDICS | Facility: CLINIC | Age: 70
End: 2025-01-16
Payer: MEDICARE

## 2025-01-16 ENCOUNTER — HOSPITAL ENCOUNTER (OUTPATIENT)
Dept: RADIOLOGY | Facility: HOSPITAL | Age: 70
Discharge: HOME | End: 2025-01-16
Attending: NURSE PRACTITIONER
Payer: MEDICARE

## 2025-01-16 VITALS — WEIGHT: 160.94 LBS | HEIGHT: 66 IN | BODY MASS INDEX: 25.86 KG/M2

## 2025-01-16 DIAGNOSIS — S42.295D OTHER CLOSED NONDISPLACED FRACTURE OF PROXIMAL END OF LEFT HUMERUS WITH ROUTINE HEALING, SUBSEQUENT ENCOUNTER: Primary | ICD-10-CM

## 2025-01-16 DIAGNOSIS — S42.455D CLOSED NONDISPLACED FRACTURE OF LATERAL CONDYLE OF LEFT HUMERUS WITH ROUTINE HEALING, SUBSEQUENT ENCOUNTER: ICD-10-CM

## 2025-01-16 DIAGNOSIS — I10 ESSENTIAL HYPERTENSION: ICD-10-CM

## 2025-01-16 DIAGNOSIS — S42.295D OTHER CLOSED NONDISPLACED FRACTURE OF PROXIMAL END OF LEFT HUMERUS WITH ROUTINE HEALING, SUBSEQUENT ENCOUNTER: ICD-10-CM

## 2025-01-16 PROCEDURE — 1159F MED LIST DOCD IN RCRD: CPT | Mod: HCNC,CPTII,S$GLB, | Performed by: NURSE PRACTITIONER

## 2025-01-16 PROCEDURE — 99999 PR PBB SHADOW E&M-EST. PATIENT-LVL III: CPT | Mod: PBBFAC,HCNC,, | Performed by: NURSE PRACTITIONER

## 2025-01-16 PROCEDURE — 1126F AMNT PAIN NOTED NONE PRSNT: CPT | Mod: HCNC,CPTII,S$GLB, | Performed by: NURSE PRACTITIONER

## 2025-01-16 PROCEDURE — 3008F BODY MASS INDEX DOCD: CPT | Mod: HCNC,CPTII,S$GLB, | Performed by: NURSE PRACTITIONER

## 2025-01-16 PROCEDURE — 73060 X-RAY EXAM OF HUMERUS: CPT | Mod: TC,HCNC,LT

## 2025-01-16 PROCEDURE — 73060 X-RAY EXAM OF HUMERUS: CPT | Mod: 26,HCNC,LT, | Performed by: RADIOLOGY

## 2025-01-16 PROCEDURE — 99213 OFFICE O/P EST LOW 20 MIN: CPT | Mod: HCNC,S$GLB,, | Performed by: NURSE PRACTITIONER

## 2025-01-16 PROCEDURE — 1160F RVW MEDS BY RX/DR IN RCRD: CPT | Mod: HCNC,CPTII,S$GLB, | Performed by: NURSE PRACTITIONER

## 2025-01-16 RX ORDER — TRIAMTERENE AND HYDROCHLOROTHIAZIDE 37.5; 25 MG/1; MG/1
1 CAPSULE ORAL DAILY
Qty: 90 CAPSULE | Refills: 3 | Status: SHIPPED | OUTPATIENT
Start: 2025-01-16

## 2025-01-16 NOTE — TELEPHONE ENCOUNTER
Refill Decision Note   Chelsie Foster  is requesting a refill authorization.  Brief Assessment and Rationale for Refill:  Approve     Medication Therapy Plan:        Comments:     Note composed:8:57 AM 01/16/2025

## 2025-01-16 NOTE — TELEPHONE ENCOUNTER
Care Due:                  Date            Visit Type   Department     Provider  --------------------------------------------------------------------------------                                EP -                              PRIMARY      [facility]  Last Visit: 10-      CARE ([facility])   MEDICINE       TRINI ELLIS  Next Visit: None Scheduled  None         None Found                                                            Last  Test          Frequency    Reason                     Performed    Due Date  --------------------------------------------------------------------------------    Mg Level....  12 months..  alendronate..............  Not Found    Overdue    Phosphate...  12 months..  alendronate..............  Not Found    Overdue    [employer] Embedded Care Due Messages. Reference number: [id number].   1/16/2025 8:34:36 AM CST

## 2025-01-16 NOTE — PROGRESS NOTES
SUBJECTIVE:     Chief Complaint & History of Present Illness:  Chelsie Foster is a Established 69 y.o. year old female patient for follow-up for a left proximal humerus fracture.  Patient was last seen in clinic on 11/7/24.  She was told to continue working on range of motion of her fingers, wrist, elbow and start our shoulder rehab protocol.      Currently, the patient reports her pain is improving.  She is using over-the-counter analgesics p.r.n..  She has been going to therapy once a week.  She reports the therapist would like her to come twice a week and feel that she is improving in her range of motion activities.  She denies any falls or injuries since last seen in the clinic.  Denies any numbness or tingling sensation down her lower arm.      Review of patient's allergies indicates:   Allergen Reactions    No known drug allergies          Current Outpatient Medications   Medication Sig Dispense Refill    acetaminophen (TYLENOL) 500 MG tablet Take 2 tablets (1,000 mg total) by mouth every 8 (eight) hours as needed for Pain. 90 tablet 0    alendronate (FOSAMAX) 70 MG tablet Take 1 tablet (70 mg total) by mouth every 7 days. Take on an empty stomach once a week with 8 oz water.  Do not eat and remain upright for 30 minutes. 12 tablet 3    augmented betamethasone dipropionate (DIPROLENE-AF) 0.05 % cream Apply topically 2 (two) times daily. 50 g 3    cholecalciferol, vitamin D3, (VITAMIN D3) 125 mcg (5,000 unit) Tab Take 1 tablet (5,000 Units total) by mouth once daily.      diltiaZEM (CARDIZEM CD) 180 MG 24 hr capsule Take 1 capsule (180 mg total) by mouth once daily. 90 capsule 1    fluocinonide (LIDEX) 0.05 % ointment Apply topically 2 (two) times daily to rash 60 g 2    levothyroxine (SYNTHROID) 112 MCG tablet TAKE 1 TABLET BY MOUTH ONCE DAILY 90 tablet 3    LORazepam (ATIVAN) 1 MG tablet TAKE 1/2 TO 1 (ONE-HALF TO ONE) TABLET BY MOUTH IN THE EVENING 30 tablet 2    oxyCODONE (ROXICODONE) 5 MG immediate  release tablet Take 1 tablet (5 mg total) by mouth every 6 (six) hours as needed for Pain. 28 tablet 0    pantoprazole (PROTONIX) 40 MG tablet TAKE 1 TABLET BY MOUTH EVERY DAY 90 tablet 3    potassium chloride SA (K-DUR,KLOR-CON) 20 MEQ tablet Take 1 tablet (20 mEq total) by mouth 2 (two) times daily. 90 tablet 3    pravastatin (PRAVACHOL) 80 MG tablet Take 1 tablet (80 mg total) by mouth once daily. 90 tablet 3    triamcinolone acetonide 0.1% (KENALOG) 0.1 % cream Apply topically 2 (two) times daily. 45 g 1    triamcinolone acetonide 0.1% (KENALOG) 0.1 % ointment Apply to affected areas of rash twice a day for 2-4 weeks at a time as needed for rash. 454 g 2    triamterene-hydrochlorothiazide 37.5-25 mg (DYAZIDE) 37.5-25 mg per capsule TAKE 1 CAPSULE BY MOUTH EVERY DAY. 90 capsule 3    EPINEPHrine (EPIPEN) 0.3 mg/0.3 mL AtIn Inject 0.3 mLs (0.3 mg total) into the muscle once. for 1 dose 2 each 1     No current facility-administered medications for this visit.       Past Medical History:   Diagnosis Date    Anxiety     Basal cell carcinoma of nose 2023    nasal bridge    Breast cancer 2015    Right breast infiltrating ductal CA, ER/TN/Her2 negative    Depression     Genetic testing     see clinic notes 5-19-15 for details, VUS in [place]    GERD (gastroesophageal reflux disease)     Hyperlipidemia     Hypertension     Hypothyroidism     PONV (postoperative nausea and vomiting)     Thyroid disease     hypothryoidism       Past Surgical History:   Procedure Laterality Date    APPENDECTOMY      BREAST BIOPSY Right 2015    IDC     SECTION      COLONOSCOPY N/A 2016    Procedure: COLONOSCOPY;  Surgeon: YULIYA Sherman MD;  Location: [facility] (4TH FLR);  Service: Endoscopy;  Laterality: N/A;    COLONOSCOPY N/A 2024    Procedure: COLONOSCOPY;  Surgeon: Ulices Smith MD;  Location: [facility] (2ND FLR);  Service: Endoscopy;  Laterality: N/A;  pt stated she aspirated with last colonoscopy-   flr  ref by / [facility] portal-RB  11/30/23- Pt r/s- prep ins on portal.- PEG - ERW  1/24-precall complete-MS       Family History   Problem Relation Name Age of Onset    Hypertension Mother      Hyperlipidemia Mother      Breast cancer Mother  60    Cancer Mother          Basal cell skin CA, lymphoma    Depression Mother      Lymphoma Mother      Basal cell carcinoma Mother      Osteoporosis Mother      Basal cell carcinoma Father      Colon cancer Father      Dementia Father      Cancer Father          colon CA and Basal Cell skin CA    Heart failure Father      Hypertension Sister raymundo     Diabetes Sister raymundo     Hypertension Sister denise     Hypertension Sister tatyana     Transient ischemic attack Sister tatyana     Mental illness Son Yruiy         bipolar/schizoaffective    Bipolar disorder Son Yruiy     Diabetes Son Yuriy     Schizophrenia Son Yuriy     Breast cancer Maternal Aunt  50    Cancer Maternal Aunt          Colon CA    Colon cancer Maternal Aunt      Hypertension Paternal Aunt      Diabetes Paternal Aunt      Heart failure Paternal Uncle      Coronary artery disease Maternal Grandfather      Cancer Maternal Cousin      Breast cancer Maternal Cousin      Breast cancer Cousin  49        maternal    Amblyopia Neg Hx      Blindness Neg Hx      Cataracts Neg Hx      Glaucoma Neg Hx      Macular degeneration Neg Hx      Retinal detachment Neg Hx      Strabismus Neg Hx      Stroke Neg Hx      Thyroid disease Neg Hx      Ovarian cancer Neg Hx      Melanoma Neg Hx       Review of Systems:  ROS:  Constitutional: no fever or chills  Eyes: no visual changes  ENT: no nasal congestion or sore throat  Respiratory: no cough or shortness of breath  Cardiovascular: no chest pain or palpitations  Gastrointestinal: no nausea or vomiting, tolerating diet  Genitourinary: no hematuria or dysuria  Integument/Breast: no rash or pruritis  Hematologic/Lymphatic: no easy bruising or lymphadenopathy  Musculoskeletal:  "no arthralgias or myalgias  Neurological: no seizures or tremors  Behavioral/Psych: no auditory or visual hallucinations  Endocrine: no heat or cold intolerance      OBJECTIVE:     PHYSICAL EXAM:  Vital Signs (Most Recent)  There were no vitals filed for this visit.  Height: 5' 6" (167.6 cm) Weight: 73 kg (160 lb 15 oz),   Estimated body mass index is 25.98 kg/m² as calculated from the following:    Height as of this encounter: 5' 6" (1.676 m).    Weight as of this encounter: 73 kg (160 lb 15 oz).   General Appearance: Well nourished, well developed, in no acute distress.  HENT: Normal cephalic, oropharynx pink and moist  Eyes: PERRLA bilaterally and EOM x 4  Respiratory: Even and unlabored  Skin: Warm and Dry.   Psychiatric: AAO x 4, Mood & affect are normal.    Shoulder exam: left  Tenderness:  None  ROM:  She can flex and extend the thumb, abduct and adduct all fingers, flexion and extension of the wrist proximally 30°, range of motion of the elbow 5 to 130°.  Patient able to perform pendulum swings.  For flexion passive range of motion of the shoulder is 0 to 150°.  Shoulder Strength:  4/5  normal active ROM, no tenderness, no impingement sign      RADIOGRAPHS:  Left humerus x-ray:  Fracture of the proximal left humerus is seen in good position and alignment with callus formation developing.  All radiographs were personally reviewed by me.    ASSESSMENT/PLAN:       ICD-10-CM ICD-9-CM   1. Other closed nondisplaced fracture of proximal end of left humerus with routine healing, subsequent encounter  S42.295D V54.11   2. Closed nondisplaced fracture of lateral condyle of left humerus with routine healing, subsequent encounter  S42.455D V54.11       -Chelsie Foster presents to clinic with c/c left proximal humerus fracture an avulsion to her humeral condyle secondary to a trip and fall on September 18, 2024.  -X-ray as above.    I feels she is making good progress in her recovery.  Recommend she continue working " with therapy to improve functional strength and range of motion of the shoulder.  She can discuss with therapy about discharge planning at home exercise program when ready.    At this point I will discharge the patient.  Recommend she follow up in the clinic p.r.n..

## 2025-01-31 RX ORDER — DILTIAZEM HYDROCHLORIDE 180 MG/1
180 CAPSULE, COATED, EXTENDED RELEASE ORAL DAILY
Qty: 90 CAPSULE | Refills: 2 | Status: SHIPPED | OUTPATIENT
Start: 2025-01-31

## 2025-01-31 NOTE — TELEPHONE ENCOUNTER
No care due was identified.  [facility] Embedded Care Due Messages. Reference number: [id number].   1/31/2025 11:33:47 AM CST

## 2025-02-17 DIAGNOSIS — F41.9 ANXIETY: ICD-10-CM

## 2025-02-17 RX ORDER — LORAZEPAM 1 MG/1
TABLET ORAL
Qty: 30 TABLET | Refills: 2 | Status: CANCELLED | OUTPATIENT
Start: 2025-02-17

## 2025-02-18 DIAGNOSIS — F41.9 ANXIETY: ICD-10-CM

## 2025-02-18 RX ORDER — LORAZEPAM 1 MG/1
TABLET ORAL
Qty: 30 TABLET | Refills: 2 | Status: SHIPPED | OUTPATIENT
Start: 2025-02-18

## 2025-02-18 NOTE — TELEPHONE ENCOUNTER
No care due was identified.  [facility] Embedded Care Due Messages. Reference number: [id number].   2/17/2025 10:24:42 PM CST

## 2025-02-18 NOTE — TELEPHONE ENCOUNTER
No care due was identified.  [facility] Embedded Care Due Messages. Reference number: [id number].   2/18/2025 9:38:55 AM CST

## 2025-02-24 DIAGNOSIS — Z00.00 ENCOUNTER FOR MEDICARE ANNUAL WELLNESS EXAM: ICD-10-CM

## 2025-03-17 ENCOUNTER — HOSPITAL ENCOUNTER (EMERGENCY)
Facility: HOSPITAL | Age: 70
Discharge: HOME | End: 2025-03-17
Payer: MEDICARE

## 2025-03-17 VITALS
TEMPERATURE: 98 F | DIASTOLIC BLOOD PRESSURE: 72 MMHG | OXYGEN SATURATION: 100 % | HEART RATE: 88 BPM | WEIGHT: 159 LBS | BODY MASS INDEX: 25.66 KG/M2 | SYSTOLIC BLOOD PRESSURE: 159 MMHG | RESPIRATION RATE: 16 BRPM

## 2025-03-17 PROCEDURE — 99999 HC NO LEVEL OF SERVICE - ED ONLY: CPT | Mod: HCNC

## 2025-03-19 ENCOUNTER — PATIENT MESSAGE (OUTPATIENT)
Dept: ADMINISTRATIVE | Facility: HOSPITAL | Age: 70
End: 2025-03-19
Payer: MEDICARE

## 2025-03-20 ENCOUNTER — PATIENT OUTREACH (OUTPATIENT)
Dept: ADMINISTRATIVE | Facility: HOSPITAL | Age: 70
End: 2025-03-20
Payer: MEDICARE

## 2025-03-20 DIAGNOSIS — Z12.31 VISIT FOR SCREENING MAMMOGRAM: Primary | ICD-10-CM

## 2025-03-20 NOTE — PROGRESS NOTES
Chart reviewed and updated. Reconciled immunizations, health maintenance and care everywhere.  Placed MMG order and responded to portal msg.      Jessica Urias, [facility]  Panel Care Coordinator  [facility]  [phone]  For Christi Carrillo MD

## 2025-05-19 ENCOUNTER — OFFICE VISIT (OUTPATIENT)
Dept: DERMATOLOGY | Facility: CLINIC | Age: 70
End: 2025-05-19
Payer: MEDICARE

## 2025-05-19 DIAGNOSIS — D48.5 NEOPLASM OF UNCERTAIN BEHAVIOR OF SKIN: Primary | ICD-10-CM

## 2025-05-19 DIAGNOSIS — D18.01 ANGIOMA OF SKIN: ICD-10-CM

## 2025-05-19 DIAGNOSIS — Z12.83 SCREENING EXAM FOR SKIN CANCER: ICD-10-CM

## 2025-05-19 DIAGNOSIS — L82.1 SK (SEBORRHEIC KERATOSIS): ICD-10-CM

## 2025-05-19 DIAGNOSIS — D22.9 MULTIPLE BENIGN NEVI: ICD-10-CM

## 2025-05-19 DIAGNOSIS — M67.449 DIGITAL MUCOUS CYST: ICD-10-CM

## 2025-05-19 DIAGNOSIS — D36.10 NEUROFIBROMA: ICD-10-CM

## 2025-05-19 PROCEDURE — 1160F RVW MEDS BY RX/DR IN RCRD: CPT | Mod: CPTII,HCNC,S$GLB, | Performed by: DERMATOLOGY

## 2025-05-19 PROCEDURE — 1101F PT FALLS ASSESS-DOCD LE1/YR: CPT | Mod: CPTII,HCNC,S$GLB, | Performed by: DERMATOLOGY

## 2025-05-19 PROCEDURE — 99999 PR PBB SHADOW E&M-EST. PATIENT-LVL III: CPT | Mod: PBBFAC,HCNC,, | Performed by: DERMATOLOGY

## 2025-05-19 PROCEDURE — 3288F FALL RISK ASSESSMENT DOCD: CPT | Mod: CPTII,HCNC,S$GLB, | Performed by: DERMATOLOGY

## 2025-05-19 PROCEDURE — 88305 TISSUE EXAM BY PATHOLOGIST: CPT | Mod: TC,HCNC | Performed by: DERMATOLOGY

## 2025-05-19 PROCEDURE — 1159F MED LIST DOCD IN RCRD: CPT | Mod: CPTII,HCNC,S$GLB, | Performed by: DERMATOLOGY

## 2025-05-19 PROCEDURE — 11102 TANGNTL BX SKIN SINGLE LES: CPT | Mod: HCNC,S$GLB,, | Performed by: DERMATOLOGY

## 2025-05-19 PROCEDURE — 1126F AMNT PAIN NOTED NONE PRSNT: CPT | Mod: CPTII,HCNC,S$GLB, | Performed by: DERMATOLOGY

## 2025-05-19 PROCEDURE — 99213 OFFICE O/P EST LOW 20 MIN: CPT | Mod: 25,HCNC,S$GLB, | Performed by: DERMATOLOGY

## 2025-05-19 NOTE — PROGRESS NOTES
Subjective:      Patient ID:  Chelsie Foster is a 69 y.o. female who presents for   Chief Complaint   Patient presents with    Skin Check     TBSE      History of Present Illness: The patient presents for follow up of skin check.    The patient was last seen on: 2/27/24 for Eczema for which pt states she tx with Diprolene cream prn flares q 1 - 2 months (when gets nervous, worse in summer), which pt states her last flare was maybe 1-2 months ago.    This is a high risk patient here to check for the development of new lesions.     Other skin complaints: no new concerns      Review of Systems   Constitutional:  Negative for fever, chills and fatigue.   Skin:  Positive for daily sunscreen use (makeup) and activity-related sunscreen use. Negative for itching (occ), rash, dry skin, recent sunburn and wears hat.   Hematologic/Lymphatic: Does not bruise/bleed easily.   Allergic/Immunologic: Negative for environmental allergies (not taking antihis).       Objective:   Physical Exam   Constitutional: She appears well-developed and well-nourished. No distress.   Neurological: She is alert and oriented to person, place, and time. She is not disoriented.   Psychiatric: She has a normal mood and affect.   Skin:   Areas Examined (abnormalities noted in diagram):   Scalp / Hair Palpated and Inspected  Head / Face Inspection Performed  Neck Inspection Performed  Chest / Axilla Inspection Performed  Abdomen Inspection Performed  Genitals / Buttocks / Groin Inspection Performed  Back Inspection Performed  RUE Inspected  LUE Inspection Performed  RLE Inspected  LLE Inspection Performed  Nails and Digits Inspection Performed                     Diagram Legend     Erythematous scaling macule/papule c/w actinic keratosis       Vascular papule c/w angioma      Pigmented verrucoid papule/plaque c/w seborrheic keratosis      Yellow umbilicated papule c/w sebaceous hyperplasia      Irregularly shaped tan macule c/w lentigo     1-2 mm  smooth white papules consistent with Milia      Movable subcutaneous cyst with punctum c/w epidermal inclusion cyst      Subcutaneous movable cyst c/w pilar cyst      Firm pink to brown papule c/w dermatofibroma      Pedunculated fleshy papule(s) c/w skin tag(s)      Evenly pigmented macule c/w junctional nevus     Mildly variegated pigmented, slightly irregular-bordered macule c/w mildly atypical nevus      Flesh colored to evenly pigmented papule c/w intradermal nevus       Pink pearly papule/plaque c/w basal cell carcinoma      Erythematous hyperkeratotic cursted plaque c/w SCC      Surgical scar with no sign of skin cancer recurrence      Open and closed comedones      Inflammatory papules and pustules      Verrucoid papule consistent consistent with wart     Erythematous eczematous patches and plaques     Dystrophic onycholytic nail with subungual debris c/w onychomycosis     Umbilicated papule    Erythematous-base heme-crusted tan verrucoid plaque consistent with inflamed seborrheic keratosis     Erythematous Silvery Scaling Plaque c/w Psoriasis     See annotation      Assessment / Plan:      Pathology Orders:       Normal Orders This Visit    Specimen to Pathology, Dermatology     Questions:    Procedure Type: Dermatology and skin neoplasms    Number of Specimens: 1    ------------------------: -------------------------    Spec 1 Procedure: Shave Biopsy    Spec 1 Clinical Impression: r/o atypical nevus    Spec 1 Source: left thigh    Clinical Information: see EPIC    Clinical History: see EPIC    Specimen Source: Skin    Release to patient: Immediate    Send normal result to authorizing provider's In Basket if patient is active on MyChart: Yes          Neoplasm of uncertain behavior of skin  -     Specimen to Pathology, Dermatology    Shave biopsy procedure note:    Shave biopsy performed after verbal consent including risk of infection, scar, recurrence, need for additional treatment of site. Area prepped  with alcohol, anesthetized with approximately 1.0cc of 1% lidocaine with epinephrine. Lesional tissue shaved with razor blade. Hemostasis achieved with application of aluminum chloride followed by hyfrecation. No complications. Dressing applied. Wound care explained.      Neurofibroma - left chest   - minor problem and chronic.   Reassurance given to patient. No treatment necessary.       Multiple benign nevi   - minor problem and chronic.   Reassurance given to patient. No treatment necessary.       SK (seborrheic keratosis)  These are benign inherited growths without a malignant potential. Reassurance given to patient. No treatment is necessary.       Angioma of skin  This is a benign vascular lesion. Reassurance given. No treatment required.       Digital mucous cyst - right 3rd toe   - minor problem and chronic.   Reassurance given to patient. No treatment necessary.       Screening exam for skin cancer  Total body skin examination performed today including at least 12 points as noted in physical examination. Suspicious lesions noted.    Recommend daily sun protection/avoidance, use of at least SPF 30, broad spectrum sunscreen (OTC drug), skin self examinations, and routine physician surveillance to optimize early detection               No follow-ups on file.

## 2025-05-20 ENCOUNTER — RESULTS FOLLOW-UP (OUTPATIENT)
Dept: DERMATOLOGY | Facility: CLINIC | Age: 70
End: 2025-05-20

## 2025-05-20 LAB
ESTROGEN SERPL-MCNC: NORMAL PG/ML
INSULIN SERPL-ACNC: NORMAL U[IU]/ML
LAB AP CLINICAL INFORMATION: NORMAL
LAB AP GROSS DESCRIPTION: NORMAL
LAB AP REPORT FOOTNOTES: NORMAL
T3RU NFR SERPL: NORMAL %

## 2025-05-29 ENCOUNTER — HOSPITAL ENCOUNTER (OUTPATIENT)
Dept: RADIOLOGY | Facility: HOSPITAL | Age: 70
Discharge: HOME | End: 2025-05-29
Attending: INTERNAL MEDICINE
Payer: MEDICARE

## 2025-05-29 DIAGNOSIS — Z12.31 VISIT FOR SCREENING MAMMOGRAM: ICD-10-CM

## 2025-05-29 PROCEDURE — 77063 BREAST TOMOSYNTHESIS BI: CPT | Mod: TC,HCNC

## 2025-06-25 ENCOUNTER — TELEPHONE (OUTPATIENT)
Dept: PHARMACY | Facility: CLINIC | Age: 70
End: 2025-06-25
Payer: MEDICARE

## 2025-06-26 NOTE — TELEPHONE ENCOUNTER
[facility]/Population Health Chart Review & Patient Outreach Details For Medication Adherence Project    Reason for Outreach Encounter: 3rd Party payor non-compliance report ([employer], [employer], [employer], etc)  2.  Patient Outreach Method: Reviewed patient chart   3.   Medication in question:    Hyperlipidemia Medications              pravastatin (PRAVACHOL) 80 MG tablet Take 1 tablet (80 mg total) by mouth once daily.                  LF 90 ds 5/24/25    4.  Reviewed and or Updates Made To: Patient Chart  5. Outreach Outcomes and/or actions taken: Patient filled medication and is on track to be adherent  Additional Notes:

## 2025-07-03 ENCOUNTER — TELEPHONE (OUTPATIENT)
Dept: INTERNAL MEDICINE | Facility: CLINIC | Age: 70
End: 2025-07-03
Payer: MEDICARE

## 2025-07-03 DIAGNOSIS — E78.5 HYPERLIPIDEMIA, UNSPECIFIED HYPERLIPIDEMIA TYPE: Primary | ICD-10-CM

## 2025-07-03 DIAGNOSIS — M81.0 OSTEOPOROSIS, UNSPECIFIED OSTEOPOROSIS TYPE, UNSPECIFIED PATHOLOGICAL FRACTURE PRESENCE: ICD-10-CM

## 2025-07-03 DIAGNOSIS — E55.9 VITAMIN D DEFICIENCY: ICD-10-CM

## 2025-07-03 DIAGNOSIS — E03.4 HYPOTHYROIDISM DUE TO ACQUIRED ATROPHY OF THYROID: ICD-10-CM

## 2025-07-03 NOTE — TELEPHONE ENCOUNTER
Copied from CRM #[id number]. Topic: General Inquiry - Patient Advice  >> Jul 3, 2025 11:25 AM Patrick wrote:  Type: Patient Call Back    Who called: Patient     What is the request in detail: Pt is requesting to have orders placed for lab work prior to her annual appt. Please advise     Would the patient rather a call back or a response via [facility]?  call    Best call back number: [phone]     Additional Information:

## 2025-07-04 DIAGNOSIS — F41.9 ANXIETY: ICD-10-CM

## 2025-07-04 NOTE — TELEPHONE ENCOUNTER
Care Due:                  Date            Visit Type   Department     Provider  --------------------------------------------------------------------------------                                EP -                              PRIMARY      [facility] INTERNAL  Last Visit: 10-      CARE ([facility])   ELHAM Carrillo                              EP -                              PRIMARY      [facility] INTERNAL  Next Visit: 10-      CARE ([facility])   ELHAM Carrillo                                                            Last  Test          Frequency    Reason                     Performed    Due Date  --------------------------------------------------------------------------------    Albumin.....  12 months..  alendronate..............  08- 08-    Mg Level....  12 months..  alendronate..............  Not Found    Overdue    Phosphate...  12 months..  alendronate..............  Not Found    Overdue    TSH.........  12 months..  levothyroxine............  08- 08-    [facility] Embedded Care Due Messages. Reference number: [id number].   7/04/2025 6:57:03 PM CDT

## 2025-07-07 RX ORDER — LORAZEPAM 1 MG/1
TABLET ORAL
Qty: 30 TABLET | Refills: 2 | Status: SHIPPED | OUTPATIENT
Start: 2025-07-07

## 2025-07-09 ENCOUNTER — TELEPHONE (OUTPATIENT)
Dept: INTERNAL MEDICINE | Facility: CLINIC | Age: 70
End: 2025-07-09
Payer: MEDICARE

## 2025-07-09 NOTE — TELEPHONE ENCOUNTER
Copied from CRM #[id number]. Topic: General Inquiry - Return Call  >> Jul 9, 2025  4:01 PM Paul wrote:  Patient is returning a phone call.     Who left a message for the patient: nurse     Does patient know what this is regarding:  appt     Would you like a call back, or a response through your [facility] portal?: call back @ [phone]     Comments:

## 2025-07-10 ENCOUNTER — TELEPHONE (OUTPATIENT)
Dept: PHARMACY | Facility: CLINIC | Age: 70
End: 2025-07-10
Payer: MEDICARE

## 2025-07-10 NOTE — TELEPHONE ENCOUNTER
[facility]/Population Health Chart Review & Patient Outreach Details For Medication Adherence Project    Reason for Outreach Encounter: 3rd Party payor non-compliance report ([employer], [employer], C, etc)  2.  Patient Outreach Method: Reviewed patient chart   3.   Medication in question:    Hyperlipidemia Medications              pravastatin (PRAVACHOL) 80 MG tablet Take 1 tablet (80 mg total) by mouth once daily.                  pravastatin  last filled  5/24 for 90 day supply    4.  Reviewed and or Updates Made To: Patient Chart  5. Outreach Outcomes and/or actions taken: Patient filled medication and is on track to be adherent  Additional Notes:

## 2025-08-05 ENCOUNTER — OFFICE VISIT (OUTPATIENT)
Dept: OPTOMETRY | Facility: CLINIC | Age: 70
End: 2025-08-05
Payer: MEDICARE

## 2025-08-05 DIAGNOSIS — H52.4 MYOPIA WITH PRESBYOPIA OF BOTH EYES: Primary | ICD-10-CM

## 2025-08-05 DIAGNOSIS — H04.123 DRY EYE SYNDROME, BILATERAL: ICD-10-CM

## 2025-08-05 DIAGNOSIS — H43.393 VITREOUS FLOATERS OF BOTH EYES: ICD-10-CM

## 2025-08-05 DIAGNOSIS — H52.4 PRESBYOPIA: ICD-10-CM

## 2025-08-05 DIAGNOSIS — Z46.0 FITTING AND ADJUSTMENT OF SPECTACLES AND CONTACT LENSES: Primary | ICD-10-CM

## 2025-08-05 DIAGNOSIS — H52.13 MYOPIA WITH PRESBYOPIA OF BOTH EYES: Primary | ICD-10-CM

## 2025-08-05 DIAGNOSIS — H25.13 NUCLEAR SCLEROSIS OF BOTH EYES: ICD-10-CM

## 2025-08-05 PROCEDURE — 99999 PR PBB SHADOW E&M-EST. PATIENT-LVL I: CPT | Mod: PBBFAC,HCNC,, | Performed by: OPTOMETRIST

## 2025-08-05 PROCEDURE — 92014 COMPRE OPH EXAM EST PT 1/>: CPT | Mod: HCNC,S$GLB,, | Performed by: OPTOMETRIST

## 2025-08-05 PROCEDURE — 92310 CONTACT LENS FITTING OU: CPT | Mod: CSM,HCNC,, | Performed by: OPTOMETRIST

## 2025-08-05 PROCEDURE — 3288F FALL RISK ASSESSMENT DOCD: CPT | Mod: CPTII,HCNC,S$GLB, | Performed by: OPTOMETRIST

## 2025-08-05 PROCEDURE — 1101F PT FALLS ASSESS-DOCD LE1/YR: CPT | Mod: CPTII,HCNC,S$GLB, | Performed by: OPTOMETRIST

## 2025-08-05 PROCEDURE — 1126F AMNT PAIN NOTED NONE PRSNT: CPT | Mod: CPTII,HCNC,S$GLB, | Performed by: OPTOMETRIST

## 2025-08-05 PROCEDURE — 92015 DETERMINE REFRACTIVE STATE: CPT | Mod: HCNC,S$GLB,, | Performed by: OPTOMETRIST

## 2025-08-05 PROCEDURE — 99999 PR PBB SHADOW E&M-EST. PATIENT-LVL III: CPT | Mod: PBBFAC,HCNC,, | Performed by: OPTOMETRIST

## 2025-08-05 NOTE — PROGRESS NOTES
HPI    Annual CLF 6-13/2024 Dr. Ortiz    Pt states va is stable since last visit. Pt happy with vision. Pt states   she does use AT when her CL feels dry after having them in all day.     No flashes  No floaters  No pain  No tearing  No dry eye  No irritation  Gtts: Blink At's PRN OU  Last edited by Karol Yi on 8/5/2025  1:11 PM.            Assessment /Plan     For exam results, see Encounter Report.    Myopia with presbyopia of both eyes  Presbyopia  Rx specs  Prior diagnosis of bilateral compound myopic astigmatism and presbyopia.    Contact lens fit appears satisfactory in both eyes.  Wearing CLs well in both eyes.    Showing no need for power change in either contact lens.      Wears corneal RGP contact lenses with distance correction in both eyes.   Uses reading glasses over contact lenses.   try monovision wearing lens in OD only    Nuclear sclerosis of both eyes  Vitreous floaters of both eyes  Bilateral early nuclear sclerosis of lens, and bilateral minimal posterior subcapsular cataract.   Prior finding of bilateral vitreous floaters, without evidence of retinal etiology.     Dry eye syndrome, bilateral   Start IVIZIA BID/PRN       Otherwise, ocular health appears good in both eyes.           RTC 1 year

## 2025-08-27 ENCOUNTER — PATIENT MESSAGE (OUTPATIENT)
Dept: OPTOMETRY | Facility: CLINIC | Age: 70
End: 2025-08-27
Payer: MEDICARE

## 2025-08-28 ENCOUNTER — OFFICE VISIT (OUTPATIENT)
Dept: OBSTETRICS AND GYNECOLOGY | Facility: CLINIC | Age: 70
End: 2025-08-28
Payer: MEDICARE

## 2025-08-28 VITALS
SYSTOLIC BLOOD PRESSURE: 102 MMHG | DIASTOLIC BLOOD PRESSURE: 60 MMHG | BODY MASS INDEX: 24.66 KG/M2 | HEIGHT: 66 IN | WEIGHT: 153.44 LBS

## 2025-08-28 DIAGNOSIS — Z01.419 WELL WOMAN EXAM WITH ROUTINE GYNECOLOGICAL EXAM: Primary | ICD-10-CM

## 2025-08-28 DIAGNOSIS — N95.8 OTHER SPECIFIED MENOPAUSAL AND PERIMENOPAUSAL DISORDERS: ICD-10-CM

## 2025-08-28 DIAGNOSIS — M85.80 OSTEOPENIA, UNSPECIFIED LOCATION: ICD-10-CM

## 2025-08-28 PROCEDURE — 99999 PR PBB SHADOW E&M-EST. PATIENT-LVL III: CPT | Mod: PBBFAC,HCNC,, | Performed by: STUDENT IN AN ORGANIZED HEALTH CARE EDUCATION/TRAINING PROGRAM

## 2025-08-29 ENCOUNTER — TELEPHONE (OUTPATIENT)
Dept: INTERNAL MEDICINE | Facility: CLINIC | Age: 70
End: 2025-08-29
Payer: MEDICARE

## 2025-09-03 DIAGNOSIS — E78.5 HYPERLIPIDEMIA, UNSPECIFIED HYPERLIPIDEMIA TYPE: ICD-10-CM

## 2025-09-04 RX ORDER — PRAVASTATIN SODIUM 80 MG/1
80 TABLET ORAL DAILY
Qty: 90 TABLET | Refills: 0 | Status: SHIPPED | OUTPATIENT
Start: 2025-09-04